# Patient Record
Sex: FEMALE | Race: WHITE | NOT HISPANIC OR LATINO | Employment: OTHER | URBAN - METROPOLITAN AREA
[De-identification: names, ages, dates, MRNs, and addresses within clinical notes are randomized per-mention and may not be internally consistent; named-entity substitution may affect disease eponyms.]

---

## 2023-04-26 DIAGNOSIS — J44.9 CHRONIC OBSTRUCTIVE PULMONARY DISEASE, UNSPECIFIED (MULTI): ICD-10-CM

## 2023-04-26 RX ORDER — FLUTICASONE FUROATE, UMECLIDINIUM BROMIDE AND VILANTEROL TRIFENATATE 200; 62.5; 25 UG/1; UG/1; UG/1
POWDER RESPIRATORY (INHALATION)
Qty: 60 EACH | Refills: 11 | Status: SHIPPED | OUTPATIENT
Start: 2023-04-26 | End: 2024-04-17 | Stop reason: SDUPTHER

## 2023-04-27 PROBLEM — I48.0 PAROXYSMAL ATRIAL FIBRILLATION (MULTI): Status: ACTIVE | Noted: 2023-04-27

## 2023-04-27 PROBLEM — J44.9 COPD (CHRONIC OBSTRUCTIVE PULMONARY DISEASE) (MULTI): Status: ACTIVE | Noted: 2023-04-27

## 2023-04-27 PROBLEM — K21.9 GASTROESOPHAGEAL REFLUX DISEASE: Status: ACTIVE | Noted: 2023-04-27

## 2023-04-27 PROBLEM — M81.0 OSTEOPOROSIS: Status: ACTIVE | Noted: 2023-04-27

## 2023-04-27 PROBLEM — I49.5 SINUS NODE DYSFUNCTION (MULTI): Status: ACTIVE | Noted: 2023-04-27

## 2023-04-27 PROBLEM — E78.00 PURE HYPERCHOLESTEROLEMIA: Status: ACTIVE | Noted: 2023-04-27

## 2023-04-27 PROBLEM — E55.9 VITAMIN D DEFICIENCY: Status: ACTIVE | Noted: 2023-04-27

## 2023-04-27 PROBLEM — R42 VERTIGO, INTERMITTENT: Status: ACTIVE | Noted: 2023-04-27

## 2023-04-27 PROBLEM — I10 HYPERTENSION: Status: ACTIVE | Noted: 2023-04-27

## 2023-04-27 PROBLEM — M16.9 OSTEOARTHRITIS OF HIP: Status: ACTIVE | Noted: 2023-04-27

## 2023-04-27 RX ORDER — LEVALBUTEROL TARTRATE 45 UG/1
1-2 AEROSOL, METERED ORAL EVERY 4 HOURS PRN
COMMUNITY
Start: 2020-11-18

## 2023-04-27 RX ORDER — METOPROLOL SUCCINATE 200 MG/1
1 TABLET, EXTENDED RELEASE ORAL DAILY
COMMUNITY
Start: 2022-08-26 | End: 2023-05-26 | Stop reason: SDUPTHER

## 2023-04-27 RX ORDER — AMLODIPINE BESYLATE 5 MG/1
1 TABLET ORAL DAILY
COMMUNITY
Start: 2020-11-20 | End: 2023-05-26 | Stop reason: SDUPTHER

## 2023-04-27 RX ORDER — FAMOTIDINE 20 MG/1
1 TABLET, FILM COATED ORAL 2 TIMES DAILY
COMMUNITY
Start: 2022-06-15 | End: 2023-05-26 | Stop reason: SDUPTHER

## 2023-04-27 RX ORDER — BIOTIN 1 MG
1 TABLET ORAL DAILY
COMMUNITY
Start: 2019-05-29 | End: 2023-11-08 | Stop reason: ALTCHOICE

## 2023-04-27 RX ORDER — MULTIVIT-MIN/VIT C/HERB NO.124 250-8.875
1 TABLET,CHEWABLE ORAL DAILY
COMMUNITY
Start: 2018-05-07 | End: 2023-11-08 | Stop reason: ALTCHOICE

## 2023-04-27 RX ORDER — APIXABAN 2.5 MG/1
1 TABLET, FILM COATED ORAL EVERY 12 HOURS
COMMUNITY
Start: 2019-08-22 | End: 2023-12-22 | Stop reason: SDUPTHER

## 2023-04-27 RX ORDER — CHOLECALCIFEROL (VITAMIN D3) 50 MCG
1 TABLET ORAL DAILY
COMMUNITY

## 2023-04-27 NOTE — PROGRESS NOTES
Subjective   Patient ID: Ramirez Dobbins is a 89 y.o. female who presents for Breast Mass (Breast mass on RT found on xray 2020 ).    Right breast pain and growing mass -- highly suspicious for cancer per CT 6/2022 -- 2.1 cm in largest diameter spiculated mass.     NOT interested in surgery or chemo.  But interested in diagnosis.     Feeling new lump on the left.     Dtr Pharmacist present to provide some history.       Objective   Visit Vitals  /72 (BP Location: Left arm, Patient Position: Sitting, BP Cuff Size: Adult)   Pulse 68   Temp 36.4 °C (97.6 °F) (Temporal)   Resp 16   SpO2 92%   Smoking Status Former      O: VSS AFEB Awake, Alert, NAD.  No intoxication, withdrawal, or sedation.  Chest CTA.  Heart RRR.  Ext no c/c/e.      Lab Results   Component Value Date    WBC 7.5 08/26/2022    HGB 14.0 08/26/2022    HCT 44.1 08/26/2022    MCV 98 08/26/2022     08/26/2022       Chemistry    Lab Results   Component Value Date/Time     08/26/2022 1332    K 4.1 08/26/2022 1332     08/26/2022 1332    CO2 23 08/26/2022 1332    BUN 20 08/26/2022 1332    CREATININE 0.70 08/26/2022 1332    Lab Results   Component Value Date/Time    CALCIUM 9.2 08/26/2022 1332    ALKPHOS 59 08/26/2022 1332    AST 13 08/26/2022 1332    ALT 11 08/26/2022 1332    BILITOT 0.4 08/26/2022 1332          Lab Results   Component Value Date    CHOL 197 03/01/2022     Lab Results   Component Value Date    HDL 45.1 03/01/2022     No results found for: LDLCALC  No results found for: TRIG  No components found for: CHOLHDL   No results found for: HGBA1C    Assessment/Plan   Problem List Items Addressed This Visit          Respiratory    COPD (chronic obstructive pulmonary disease) (CMS/[facility]) - Primary    Overview     # COPD w/ Dyspnea on Exertion --stable on Trelegy per Dr Cherry. Stress test normal.5/2020. Quit smoking years ago. Has nebulizer alb/atrovent to use as needed but has never used.     7/6/21 PULM -- [facility] -- doubts mild COPD  explains her symptoms. Ordered cxr and echo. Can try trelegy for convenience.    6/25/21 PFTs : mild obstruction, ABG hypoxic, low CO2 w mild respiratory alkalosis. No hypoxia on 6 minute walk.         Relevant Medications    guaiFENesin (Mucinex) 600 mg 12 hr tablet       Other    Breast cancer (CMS/HCC)    Current Assessment & Plan     Check breast bx and pet scan for dx and staging.  Not interested in treatment but interested in prognosis and willing to see palliative care at [facility] when ready.          Relevant Orders    NM PET CT FDG wholebody    BI US guided breast localization and biopsy right

## 2023-04-28 ENCOUNTER — TELEPHONE (OUTPATIENT)
Dept: PRIMARY CARE | Facility: CLINIC | Age: 88
End: 2023-04-28

## 2023-04-28 ENCOUNTER — OFFICE VISIT (OUTPATIENT)
Dept: PRIMARY CARE | Facility: CLINIC | Age: 88
End: 2023-04-28
Payer: MEDICARE

## 2023-04-28 VITALS
DIASTOLIC BLOOD PRESSURE: 72 MMHG | TEMPERATURE: 97.6 F | HEART RATE: 68 BPM | RESPIRATION RATE: 16 BRPM | OXYGEN SATURATION: 92 % | SYSTOLIC BLOOD PRESSURE: 178 MMHG

## 2023-04-28 DIAGNOSIS — C50.911 MALIGNANT NEOPLASM OF RIGHT FEMALE BREAST, UNSPECIFIED ESTROGEN RECEPTOR STATUS, UNSPECIFIED SITE OF BREAST (MULTI): Primary | ICD-10-CM

## 2023-04-28 DIAGNOSIS — J44.9 CHRONIC OBSTRUCTIVE PULMONARY DISEASE, UNSPECIFIED COPD TYPE (MULTI): Primary | ICD-10-CM

## 2023-04-28 DIAGNOSIS — C50.911 MALIGNANT NEOPLASM OF RIGHT FEMALE BREAST, UNSPECIFIED ESTROGEN RECEPTOR STATUS, UNSPECIFIED SITE OF BREAST (MULTI): ICD-10-CM

## 2023-04-28 PROBLEM — M21.70 ACQUIRED LEG LENGTH DISCREPANCY: Status: ACTIVE | Noted: 2023-04-28

## 2023-04-28 PROBLEM — C50.919 BREAST CANCER (MULTI): Status: ACTIVE | Noted: 2023-04-28

## 2023-04-28 PROCEDURE — 1157F ADVNC CARE PLAN IN RCRD: CPT | Performed by: FAMILY MEDICINE

## 2023-04-28 PROCEDURE — 99214 OFFICE O/P EST MOD 30 MIN: CPT | Performed by: FAMILY MEDICINE

## 2023-04-28 PROCEDURE — 1036F TOBACCO NON-USER: CPT | Performed by: FAMILY MEDICINE

## 2023-04-28 PROCEDURE — 3078F DIAST BP <80 MM HG: CPT | Performed by: FAMILY MEDICINE

## 2023-04-28 PROCEDURE — 1159F MED LIST DOCD IN RCRD: CPT | Performed by: FAMILY MEDICINE

## 2023-04-28 PROCEDURE — 1160F RVW MEDS BY RX/DR IN RCRD: CPT | Performed by: FAMILY MEDICINE

## 2023-04-28 PROCEDURE — 3077F SYST BP >= 140 MM HG: CPT | Performed by: FAMILY MEDICINE

## 2023-04-28 RX ORDER — GUAIFENESIN 600 MG/1
600 TABLET, EXTENDED RELEASE ORAL 2 TIMES DAILY
Qty: 60 TABLET | Refills: 11 | Status: SHIPPED | OUTPATIENT
Start: 2023-04-28 | End: 2023-11-08 | Stop reason: ALTCHOICE

## 2023-04-28 ASSESSMENT — ENCOUNTER SYMPTOMS
LOSS OF SENSATION IN FEET: 0
DEPRESSION: 0
OCCASIONAL FEELINGS OF UNSTEADINESS: 0

## 2023-04-28 NOTE — TELEPHONE ENCOUNTER
[facility] called and they do not have shoe lifts  Suggested depending on height she can go to [facility] or [facility] if its less than 1/2 inch  If its more than 1/2 inch she would have to go to -    Orthotics and prosthetics specialty [phone]    [facility]    Mehrdad Nguyen [phone]    She also states medicare will not pay for shoe lifts

## 2023-04-28 NOTE — ASSESSMENT & PLAN NOTE
Check breast bx and pet scan for dx and staging.  Not interested in treatment but interested in prognosis and willing to see palliative care at [facility] when ready.

## 2023-04-28 NOTE — TELEPHONE ENCOUNTER
Dtr. Called.    States the type of PET scan you ordered can ONLY be done at [facility].    Is there another one that would be applicable that they could try and see if it could be scheduled at [facility]?

## 2023-04-30 PROBLEM — Z00.00 MEDICARE ANNUAL WELLNESS VISIT, SUBSEQUENT: Status: ACTIVE | Noted: 2023-04-30

## 2023-05-03 ENCOUNTER — TELEPHONE (OUTPATIENT)
Dept: PRIMARY CARE | Facility: CLINIC | Age: 88
End: 2023-05-03
Payer: MEDICARE

## 2023-05-03 NOTE — TELEPHONE ENCOUNTER
Dtr. Called.    States she tried to call to sched the new PET scan order and was told that it still can only be done [place].  [phone]

## 2023-05-18 ENCOUNTER — HOSPITAL ENCOUNTER (OUTPATIENT)
Dept: DATA CONVERSION | Facility: HOSPITAL | Age: 88
End: 2023-05-18
Attending: RADIOLOGY | Admitting: RADIOLOGY
Payer: MEDICARE

## 2023-05-18 DIAGNOSIS — Z17.0 ESTROGEN RECEPTOR POSITIVE STATUS (ER+): ICD-10-CM

## 2023-05-18 DIAGNOSIS — C50.811 MALIGNANT NEOPLASM OF OVERLAPPING SITES OF RIGHT FEMALE BREAST (MULTI): ICD-10-CM

## 2023-05-18 DIAGNOSIS — N63.10 UNSPECIFIED LUMP IN THE RIGHT BREAST, UNSPECIFIED QUADRANT: ICD-10-CM

## 2023-05-18 DIAGNOSIS — Z88.0 ALLERGY STATUS TO PENICILLIN: ICD-10-CM

## 2023-05-22 LAB
COMPLETE PATHOLOGY REPORT: NORMAL
CONVERTED ADDENDUM DIAGNOSIS 2: NORMAL
CONVERTED CLINICAL DIAGNOSIS-HISTORY: NORMAL
CONVERTED FINAL DIAGNOSIS: NORMAL
CONVERTED FINAL REPORT PDF LINK TO COPY AND PASTE: NORMAL
CONVERTED GROSS DESCRIPTION: NORMAL

## 2023-05-26 ENCOUNTER — OFFICE VISIT (OUTPATIENT)
Dept: PRIMARY CARE | Facility: CLINIC | Age: 88
End: 2023-05-26
Payer: MEDICARE

## 2023-05-26 VITALS
SYSTOLIC BLOOD PRESSURE: 149 MMHG | BODY MASS INDEX: 20.73 KG/M2 | HEIGHT: 61 IN | WEIGHT: 109.8 LBS | TEMPERATURE: 97.3 F | HEART RATE: 65 BPM | RESPIRATION RATE: 16 BRPM | DIASTOLIC BLOOD PRESSURE: 73 MMHG

## 2023-05-26 DIAGNOSIS — R26.9 ABNORMAL GAIT: ICD-10-CM

## 2023-05-26 DIAGNOSIS — I10 PRIMARY HYPERTENSION: ICD-10-CM

## 2023-05-26 DIAGNOSIS — Z00.00 ROUTINE GENERAL MEDICAL EXAMINATION AT HEALTH CARE FACILITY: Primary | ICD-10-CM

## 2023-05-26 DIAGNOSIS — I48.0 PAROXYSMAL ATRIAL FIBRILLATION (MULTI): ICD-10-CM

## 2023-05-26 DIAGNOSIS — Z00.00 MEDICARE ANNUAL WELLNESS VISIT, SUBSEQUENT: ICD-10-CM

## 2023-05-26 DIAGNOSIS — K21.9 GASTROESOPHAGEAL REFLUX DISEASE WITHOUT ESOPHAGITIS: ICD-10-CM

## 2023-05-26 DIAGNOSIS — C50.911 MALIGNANT NEOPLASM OF RIGHT FEMALE BREAST, UNSPECIFIED ESTROGEN RECEPTOR STATUS, UNSPECIFIED SITE OF BREAST (MULTI): ICD-10-CM

## 2023-05-26 PROBLEM — R53.83 FATIGUE: Status: ACTIVE | Noted: 2023-05-26

## 2023-05-26 PROBLEM — I83.93 ASYMPTOMATIC VARICOSE VEINS OF BOTH LOWER EXTREMITIES: Status: ACTIVE | Noted: 2023-05-26

## 2023-05-26 PROBLEM — H61.22 HEARING LOSS OF LEFT EAR DUE TO CERUMEN IMPACTION: Status: ACTIVE | Noted: 2023-05-26

## 2023-05-26 PROBLEM — N63.10 BREAST MASS, RIGHT: Status: ACTIVE | Noted: 2023-05-26

## 2023-05-26 PROBLEM — G83.10: Status: ACTIVE | Noted: 2023-05-26

## 2023-05-26 PROBLEM — R26.89 POOR BALANCE: Status: ACTIVE | Noted: 2023-05-26

## 2023-05-26 PROCEDURE — 3077F SYST BP >= 140 MM HG: CPT | Performed by: FAMILY MEDICINE

## 2023-05-26 PROCEDURE — G0439 PPPS, SUBSEQ VISIT: HCPCS | Performed by: FAMILY MEDICINE

## 2023-05-26 PROCEDURE — 1036F TOBACCO NON-USER: CPT | Performed by: FAMILY MEDICINE

## 2023-05-26 PROCEDURE — 99214 OFFICE O/P EST MOD 30 MIN: CPT | Performed by: FAMILY MEDICINE

## 2023-05-26 PROCEDURE — 3078F DIAST BP <80 MM HG: CPT | Performed by: FAMILY MEDICINE

## 2023-05-26 PROCEDURE — 1159F MED LIST DOCD IN RCRD: CPT | Performed by: FAMILY MEDICINE

## 2023-05-26 PROCEDURE — 1160F RVW MEDS BY RX/DR IN RCRD: CPT | Performed by: FAMILY MEDICINE

## 2023-05-26 PROCEDURE — 1157F ADVNC CARE PLAN IN RCRD: CPT | Performed by: FAMILY MEDICINE

## 2023-05-26 PROCEDURE — 1170F FXNL STATUS ASSESSED: CPT | Performed by: FAMILY MEDICINE

## 2023-05-26 RX ORDER — METOPROLOL SUCCINATE 200 MG/1
200 TABLET, EXTENDED RELEASE ORAL DAILY
Qty: 90 TABLET | Refills: 1 | Status: SHIPPED | OUTPATIENT
Start: 2023-05-26 | End: 2023-08-21

## 2023-05-26 RX ORDER — FAMOTIDINE 20 MG/1
20 TABLET, FILM COATED ORAL 2 TIMES DAILY
Qty: 90 TABLET | Refills: 1 | Status: SHIPPED | OUTPATIENT
Start: 2023-05-26 | End: 2023-08-15

## 2023-05-26 RX ORDER — AMLODIPINE BESYLATE 5 MG/1
5 TABLET ORAL DAILY
Qty: 90 TABLET | Refills: 1 | Status: SHIPPED | OUTPATIENT
Start: 2023-05-26 | End: 2024-04-17 | Stop reason: SDUPTHER

## 2023-05-26 ASSESSMENT — ACTIVITIES OF DAILY LIVING (ADL)
BATHING: INDEPENDENT
MANAGING_FINANCES: INDEPENDENT
DOING_HOUSEWORK: NEEDS ASSISTANCE
TAKING_MEDICATION: INDEPENDENT
GROCERY_SHOPPING: NEEDS ASSISTANCE
DRESSING: INDEPENDENT

## 2023-05-26 ASSESSMENT — ENCOUNTER SYMPTOMS
LOSS OF SENSATION IN FEET: 0
OCCASIONAL FEELINGS OF UNSTEADINESS: 0
DEPRESSION: 0

## 2023-05-26 ASSESSMENT — PATIENT HEALTH QUESTIONNAIRE - PHQ9
SUM OF ALL RESPONSES TO PHQ9 QUESTIONS 1 AND 2: 0
1. LITTLE INTEREST OR PLEASURE IN DOING THINGS: NOT AT ALL
2. FEELING DOWN, DEPRESSED OR HOPELESS: NOT AT ALL

## 2023-05-26 NOTE — PROGRESS NOTES
"Subjective   Reason for Visit: Ramirez Dobbins is an 89 y.o. female here for a Medicare Wellness visit. And recheck on AFIB, COPD, HTN and recent dx'd breast cancer.     Past Medical, Surgical, and Family History reviewed and updated in chart.    Reviewed all medications by prescribing practitioner or clinical pharmacist (such as prescriptions, OTCs, herbal therapies and supplements) and documented in the medical record.    HPI    Patient Care Team:  Aguila Mullins MD as PCP - General     Review of Systems    Objective   Vitals:  /73 (BP Location: Left arm, Patient Position: Sitting, BP Cuff Size: Adult)   Pulse 65   Temp 36.3 °C (97.3 °F) (Temporal)   Resp 16   Ht 1.549 m (5' 1\")   Wt 49.8 kg (109 lb 12.8 oz)   BMI 20.75 kg/m²       O: VSS AFEB Awake, Alert, NAD.  No intoxication, withdrawal, or sedation.  Chest CTA.  Heart RRR.  Ext no c/c/e.      Assessment/Plan   Problem List Items Addressed This Visit          Circulatory    Hypertension    Overview     permissive hypertension on max tolerated   amlodipine 5 mg, and   metoprolol  mg daily.   OFF cardizem due to bradycardia.          Current Assessment & Plan     Continue amlodipine and metoprolol.          Paroxysmal atrial fibrillation (CMS/HCC)    Overview     # pAFIB -- metoprolol and eliqius per Dr Page.    5/28/2020 Nuclear stress test -- normal perfusion and chronicity.    12/14/21 EP: [facility] -- PAF, off diltizasem since 12/9/21. Continue metoprolol.    1/18/2023 CARDIO -- Camilo/DeCapite -- Stable pAFIB/HTN.  Eliquis.  Amlodipine at night.         Current Assessment & Plan     Stable. Continue current meds.  Eliquis.             Digestive    Gastroesophageal reflux disease       Other    Breast cancer (CMS/HCC)    Overview     Bx 5/18/23: Grade 2 Invasive Ductal Ca (Est+, Prog+, HER2 NEG)    5/15/2023 Gen Surgery ([facility]) --planning CT scans to check for mets.          Current Assessment & Plan     No pain.  Encouraged to proceed " with CT scan and if metastasis can consult oncology rather isabella proceed with lumpectomy.          Medicare annual wellness visit, subsequent    Overview     5/26/23  SUBSEQUENT Medicare Wellness -- c/o mild trouble with word finding, no dysphasia, no dysphagia. No polypharmacy, Major fall with injury summer 2022 -- mild falls with no injury since. , mild urge incontinence/nocturia. No depression. No use of cane/walker. Has 70# pretty retriever. No alcohol. Has had both pneumonia vaccines.     POA/Living WIll filed 5/3/17.    # Low albumin/PCM -- encouraged to eat frequent small high calorie meals per day. Gained 1# since she returned home from SNF.     5/26/23  I spent 15 minutes face-to-face with this individual discussing their cardiovascular risk and behavioral therapies of nutritional choices, exercise, and elimination of habits contributing to risk. We agreed on a plan addressing reduction of their current cardiovascular risk. Patient's 10 year CV risk estimate calculates to: >10 %  -- cannot tolerate statins and not on aspirin since already on Eliquis (pradaxa not covered).             Abnormal gait    Overview     Completed SNF rehab 2022 after fall and acetabular fx.          Current Assessment & Plan     Continue PT twice a week -- at [facility] -- goal to get to walk without her walker or cane.     Once she completes PT I still recommmend that she continue regular exercise/silversneakers/[facility].           Other Visit Diagnoses       Routine general medical examination at health care facility    -  Primary

## 2023-05-26 NOTE — ASSESSMENT & PLAN NOTE
No pain.  Encouraged to proceed with CT scan and if metastasis can consult oncology rather isabella proceed with lumpectomy.

## 2023-05-26 NOTE — ASSESSMENT & PLAN NOTE
Bx 5/18/23: Grade 2 Invasive Ductal Ca (Est+, Prog+, HER2 NEG)    5/15/2023 Gen Surgery ([facility])

## 2023-05-26 NOTE — ASSESSMENT & PLAN NOTE
Continue PT twice a week -- at [facility] -- goal to get to walk without her walker or cane.     Once she completes PT I still recommmend that she continue regular exercise/silversneakers/[facility].

## 2023-06-09 LAB
ALANINE AMINOTRANSFERASE (SGPT) (U/L) IN SER/PLAS: 7 U/L (ref 7–45)
ALBUMIN (G/DL) IN SER/PLAS: 4.1 G/DL (ref 3.4–5)
ALKALINE PHOSPHATASE (U/L) IN SER/PLAS: 42 U/L (ref 33–136)
ANION GAP IN SER/PLAS: 15 MMOL/L (ref 10–20)
ASPARTATE AMINOTRANSFERASE (SGOT) (U/L) IN SER/PLAS: 11 U/L (ref 9–39)
BILIRUBIN TOTAL (MG/DL) IN SER/PLAS: 0.4 MG/DL (ref 0–1.2)
CALCIUM (MG/DL) IN SER/PLAS: 9.9 MG/DL (ref 8.6–10.3)
CARBON DIOXIDE, TOTAL (MMOL/L) IN SER/PLAS: 25 MMOL/L (ref 21–32)
CHLORIDE (MMOL/L) IN SER/PLAS: 103 MMOL/L (ref 98–107)
CREATININE (MG/DL) IN SER/PLAS: 0.88 MG/DL (ref 0.5–1.05)
ERYTHROCYTE DISTRIBUTION WIDTH (RATIO) BY AUTOMATED COUNT: 13.9 % (ref 11.5–14.5)
ERYTHROCYTE MEAN CORPUSCULAR HEMOGLOBIN CONCENTRATION (G/DL) BY AUTOMATED: 32.6 G/DL (ref 32–36)
ERYTHROCYTE MEAN CORPUSCULAR VOLUME (FL) BY AUTOMATED COUNT: 99 FL (ref 80–100)
ERYTHROCYTES (10*6/UL) IN BLOOD BY AUTOMATED COUNT: 4.41 X10E12/L (ref 4–5.2)
GFR FEMALE: 62 ML/MIN/1.73M2
GLUCOSE (MG/DL) IN SER/PLAS: 100 MG/DL (ref 74–99)
HEMATOCRIT (%) IN BLOOD BY AUTOMATED COUNT: 43.8 % (ref 36–46)
HEMOGLOBIN (G/DL) IN BLOOD: 14.3 G/DL (ref 12–16)
LEUKOCYTES (10*3/UL) IN BLOOD BY AUTOMATED COUNT: 10.5 X10E9/L (ref 4.4–11.3)
PLATELETS (10*3/UL) IN BLOOD AUTOMATED COUNT: 240 X10E9/L (ref 150–450)
POTASSIUM (MMOL/L) IN SER/PLAS: 4 MMOL/L (ref 3.5–5.3)
PROTEIN TOTAL: 6.9 G/DL (ref 6.4–8.2)
SODIUM (MMOL/L) IN SER/PLAS: 139 MMOL/L (ref 136–145)
UREA NITROGEN (MG/DL) IN SER/PLAS: 17 MG/DL (ref 6–23)

## 2023-08-15 DIAGNOSIS — K21.9 GASTROESOPHAGEAL REFLUX DISEASE WITHOUT ESOPHAGITIS: ICD-10-CM

## 2023-08-15 RX ORDER — FAMOTIDINE 20 MG/1
TABLET, FILM COATED ORAL
Qty: 180 TABLET | Refills: 3 | Status: SHIPPED | OUTPATIENT
Start: 2023-08-15 | End: 2023-09-26

## 2023-08-20 DIAGNOSIS — K21.9 GASTROESOPHAGEAL REFLUX DISEASE WITHOUT ESOPHAGITIS: ICD-10-CM

## 2023-08-20 DIAGNOSIS — I10 PRIMARY HYPERTENSION: ICD-10-CM

## 2023-08-21 RX ORDER — METOPROLOL SUCCINATE 200 MG/1
200 TABLET, EXTENDED RELEASE ORAL DAILY
Qty: 90 TABLET | Refills: 3 | Status: SHIPPED | OUTPATIENT
Start: 2023-08-21 | End: 2024-03-26

## 2023-08-29 ENCOUNTER — OFFICE VISIT (OUTPATIENT)
Dept: PRIMARY CARE | Facility: CLINIC | Age: 88
End: 2023-08-29
Payer: MEDICARE

## 2023-08-29 VITALS
WEIGHT: 108.6 LBS | SYSTOLIC BLOOD PRESSURE: 148 MMHG | OXYGEN SATURATION: 96 % | RESPIRATION RATE: 16 BRPM | TEMPERATURE: 97.3 F | BODY MASS INDEX: 20.52 KG/M2 | DIASTOLIC BLOOD PRESSURE: 64 MMHG | HEART RATE: 68 BPM

## 2023-08-29 DIAGNOSIS — I48.0 PAROXYSMAL ATRIAL FIBRILLATION (MULTI): ICD-10-CM

## 2023-08-29 DIAGNOSIS — N32.81 OAB (OVERACTIVE BLADDER): ICD-10-CM

## 2023-08-29 DIAGNOSIS — G47.20 SLEEP-WAKE CYCLE DISORDER: ICD-10-CM

## 2023-08-29 DIAGNOSIS — R26.9 ABNORMAL GAIT: ICD-10-CM

## 2023-08-29 DIAGNOSIS — M81.0 AGE-RELATED OSTEOPOROSIS WITHOUT CURRENT PATHOLOGICAL FRACTURE: Primary | ICD-10-CM

## 2023-08-29 LAB
APPEARANCE, URINE: ABNORMAL
BACTERIA, URINE: ABNORMAL /HPF
BILIRUBIN, URINE: NEGATIVE
BLOOD, URINE: NEGATIVE
COLOR, URINE: YELLOW
GLUCOSE, URINE: NEGATIVE MG/DL
KETONES, URINE: NEGATIVE MG/DL
LEUKOCYTE ESTERASE, URINE: ABNORMAL
NITRITE, URINE: NEGATIVE
PH, URINE: 6 (ref 5–8)
PROTEIN, URINE: NEGATIVE MG/DL
RBC, URINE: 2 /HPF (ref 0–5)
SPECIFIC GRAVITY, URINE: 1.01 (ref 1–1.03)
SQUAMOUS EPITHELIAL CELLS, URINE: 2 /HPF
UROBILINOGEN, URINE: <2 MG/DL (ref 0–1.9)
WBC CLUMPS, URINE: ABNORMAL /HPF
WBC, URINE: >182 /HPF (ref 0–5)

## 2023-08-29 PROCEDURE — 1126F AMNT PAIN NOTED NONE PRSNT: CPT | Performed by: FAMILY MEDICINE

## 2023-08-29 PROCEDURE — 99214 OFFICE O/P EST MOD 30 MIN: CPT | Performed by: FAMILY MEDICINE

## 2023-08-29 PROCEDURE — 1157F ADVNC CARE PLAN IN RCRD: CPT | Performed by: FAMILY MEDICINE

## 2023-08-29 PROCEDURE — 3077F SYST BP >= 140 MM HG: CPT | Performed by: FAMILY MEDICINE

## 2023-08-29 PROCEDURE — 1160F RVW MEDS BY RX/DR IN RCRD: CPT | Performed by: FAMILY MEDICINE

## 2023-08-29 PROCEDURE — 1036F TOBACCO NON-USER: CPT | Performed by: FAMILY MEDICINE

## 2023-08-29 PROCEDURE — 3078F DIAST BP <80 MM HG: CPT | Performed by: FAMILY MEDICINE

## 2023-08-29 PROCEDURE — 1159F MED LIST DOCD IN RCRD: CPT | Performed by: FAMILY MEDICINE

## 2023-08-29 PROCEDURE — 81001 URINALYSIS AUTO W/SCOPE: CPT

## 2023-08-29 RX ORDER — MIRABEGRON 25 MG/1
25 TABLET, FILM COATED, EXTENDED RELEASE ORAL DAILY
Qty: 30 TABLET | Refills: 11 | Status: SHIPPED | OUTPATIENT
Start: 2023-08-29 | End: 2023-11-08

## 2023-08-29 NOTE — PROGRESS NOTES
"Subjective   Patient ID: Ramirez Dobbins is a 89 y.o. female who presents for ER follow up with Dtr Kristy.  Lives with son Edvin and Brenda.  Had a fall -- tripped over walker -- landed on tail bone -- treatied with tramadol from ER but with increased confusion over the past week.  Claims she can tolerate pain on just tylenol..    Bothered by nocturia q2h for past year or so.  No dysuria.       Objective   Visit Vitals  /64   Pulse 68   Temp 36.3 °C (97.3 °F)   Resp 16   Wt 49.3 kg (108 lb 9.6 oz)   SpO2 96%   BMI 20.52 kg/m²   Smoking Status Former   BSA 1.46 m²      O: VSS AFEB Awake, Alert, NAD.  No intoxication, withdrawal, or sedation.  Chest CTA.  Heart RRR.  Ext no c/c/e.      Lab Results   Component Value Date    WBC 11.3 08/21/2023    HGB 14.5 08/21/2023    HCT 43.5 08/21/2023    MCV 96 08/21/2023     08/21/2023       Chemistry    Lab Results   Component Value Date/Time     (L) 08/21/2023 1047    K 3.7 08/21/2023 1047     08/21/2023 1047    CO2 23 08/21/2023 1047    BUN 14 08/21/2023 1047    CREATININE 0.81 08/21/2023 1047    Lab Results   Component Value Date/Time    CALCIUM 9.5 08/21/2023 1047    ALKPHOS 39 08/21/2023 1047    AST 13 08/21/2023 1047    ALT 9 08/21/2023 1047    BILITOT 0.6 08/21/2023 1047          Lab Results   Component Value Date    CHOL 197 03/01/2022     Lab Results   Component Value Date    HDL 45.1 03/01/2022     No results found for: \"LDLCALC\"  No results found for: \"TRIG\"  No components found for: \"CHOLHDL\"   No results found for: \"HGBA1C\"    Assessment/Plan   Problem List Items Addressed This Visit       Osteoporosis - Primary    Overview     # Osteoporosis -- completed 5 year course as of 1/2020. Follow Vitamin D level and continue supplement and weight bearing exercise.          Paroxysmal atrial fibrillation (CMS/HCC)    Overview     # pAFIB -- metoprolol and eliqius per Dr Page.    5/28/2020 Nuclear stress test -- normal perfusion and " chronicity.    12/14/21 EP: [facility] -- PAF, off diltizasem since 12/9/21. Continue metoprolol.    1/18/2023 CARDIO -- Camilo/De[facility]ite -- Stable pAFIB/HTN.  Eliquis.  Amlodipine at night.         Abnormal gait    Overview     Completed SNF rehab 2022 after fall and acetabular fx.     Fall in ER with contusion and T10 compression 8/2023.         Current Assessment & Plan     RE-order PT twice a week -- at [facility] -- goal to get to walk without her walker or cane.     Once she completes PT I still recommmend that she continue regular exercise/silversneakers/[facility].          OAB (overactive bladder)    Current Assessment & Plan     Check UA C&S  NO anticholinergics due to falls  Try myrbetriq 25 mg nightly.          Sleep-wake cycle disorder    Overview     Encourage melatonin 5 mg at bedtime -- 2 hours before bedtime.

## 2023-08-29 NOTE — ASSESSMENT & PLAN NOTE
RE-order PT twice a week -- at [facility] -- goal to get to walk without her walker or cane.     Once she completes PT I still recommmend that she continue regular exercise/sil[facility]neakers/[facility].

## 2023-08-30 DIAGNOSIS — N30.00 ACUTE CYSTITIS WITHOUT HEMATURIA: Primary | ICD-10-CM

## 2023-08-30 RX ORDER — DOXYCYCLINE 100 MG/1
100 CAPSULE ORAL 2 TIMES DAILY
Qty: 14 CAPSULE | Refills: 0 | Status: SHIPPED | OUTPATIENT
Start: 2023-08-30 | End: 2023-11-20

## 2023-09-23 DIAGNOSIS — K21.9 GASTROESOPHAGEAL REFLUX DISEASE WITHOUT ESOPHAGITIS: ICD-10-CM

## 2023-09-26 RX ORDER — FAMOTIDINE 20 MG/1
20 TABLET, FILM COATED ORAL 2 TIMES DAILY
Qty: 90 TABLET | Refills: 3 | Status: SHIPPED | OUTPATIENT
Start: 2023-09-26 | End: 2024-04-17 | Stop reason: SDUPTHER

## 2023-10-03 ENCOUNTER — TREATMENT (OUTPATIENT)
Dept: PHYSICAL THERAPY | Facility: CLINIC | Age: 88
End: 2023-10-03
Payer: MEDICARE

## 2023-10-03 DIAGNOSIS — Z74.09 IMPAIRED MOBILITY: ICD-10-CM

## 2023-10-03 DIAGNOSIS — R53.1 WEAKNESS: Primary | ICD-10-CM

## 2023-10-03 PROCEDURE — 97530 THERAPEUTIC ACTIVITIES: CPT | Mod: GP,KX | Performed by: PHYSICAL THERAPIST

## 2023-10-03 PROCEDURE — 97110 THERAPEUTIC EXERCISES: CPT | Mod: GP,KX | Performed by: PHYSICAL THERAPIST

## 2023-10-03 ASSESSMENT — PAIN - FUNCTIONAL ASSESSMENT: PAIN_FUNCTIONAL_ASSESSMENT: 0-10

## 2023-10-03 ASSESSMENT — PAIN SCALES - GENERAL: PAINLEVEL_OUTOF10: 2

## 2023-10-03 NOTE — PROGRESS NOTES
Physical Therapy    Physical Therapy Treatment    Patient Name: Ramirez Dobbins  MRN: [id number]  Today's Date: 10/3/2023  Time Calculation  Start Time: 1000  Stop Time: 1052  Time Calculation (min): 52 min      Assessment:   Patient had mild pain in L rib area with standing exercises but patient reported it was tolerable.   Patient was unsteady with turning today with several loss of balance posterior requiring min assist to recover.     Plan: Continue per original POC  All prior information for this patient's episode which began on 9/18/23 is documented in the [employer] system.          Current Problem  1. Weakness        2. Impaired mobility            Subjective   General   Patient she is having trouble with her shoes as she has a shoe lift in her L shoe and she feels the shoes that she has makes her LE heavy.   Patient is having pain in L lower ribs and hip, 2/10 today.   Patient would like to work on her balance.    Precautions   Moderate fall risk      Pain  Pain Assessment: 0-10  Pain Score: 2  Pain Type: Acute pain  Pain Location: Rib cage  Pain Orientation: Left    Objective        Treatments:  Therapeutic Exercise  Therapeutic Exercise Performed: Yes  Therapeutic Exercise Activity 1: SciFit x level 1 resistance at seat 9 - patient began to have pain in L knee during so PT stopped exercises at 4 min 23 seconds  Therapeutic Exercise Activity 2: standing heel raises x 20 with UE support  Therapeutic Exercise Activity 3: standing hip abduction 2 x 10  Therapeutic Exercise Activity 4: standing hip extension 2 x 10    Therapeutic Activity  Therapeutic Activity 1: sit to stand from chair  Therapeutic Activity 2: standing on foam gentle lateral reach  Therapeutic Activity 3: sidestepping over line x 10  Therapeutic Activity 4: stepping forward and back over line x 10  Therapeutic Activity 5: standing on foam reaching above head and looking up x 10  Therapeutic Activity 6: turning in parallel bars stepping over line x  "10  Therapeutic Activity 7: standing toe taps onto 6 inch block x 10 each LE      Goals:  Encounter Problems       Encounter Problems (Active)       Balance       Pt will reduce TUG score by at least 3 sec to decrease the risk of falls.       Start:  10/03/23    Expected End:  11/13/23               PT Problem       Pt will improve Romberg score by 2/6 score       Start:  10/03/23    Expected End:  11/13/23            Pt will improve gait mechanics evidenced by equal step length, increased WBing LLE, and LRAD in order to participate in ADL, IADL, work, and leisure skills         Start:  10/03/23    Expected End:  11/13/23            Pt will increase strength of BLE musculature by at least 1 mm grade to participate in ADL, IADL, work, and leisure skills         Start:  10/03/23    Expected End:  11/13/23            Pt will improve \"AMPAC\" score to at least 30 for increased independence and ease with ADL/IADL's, skills, and work/leisure activities.         Start:  10/03/23    Expected End:  11/13/23            Pt will improve stair negotiation by use of a reciprocal pattern with single use of HR to be able to negotiate different levels of home         Start:  10/03/23    Expected End:  11/13/23                  "

## 2023-10-05 PROBLEM — S32.402A: Status: ACTIVE | Noted: 2023-10-05

## 2023-10-05 PROBLEM — N63.0 BREAST MASS: Status: ACTIVE | Noted: 2023-10-05

## 2023-10-05 PROBLEM — D64.9 ANEMIA: Status: ACTIVE | Noted: 2023-10-05

## 2023-10-05 PROBLEM — C50.911 BREAST CANCER, RIGHT (MULTI): Status: ACTIVE | Noted: 2023-04-28

## 2023-10-05 PROBLEM — R06.02 SOB (SHORTNESS OF BREATH) ON EXERTION: Status: ACTIVE | Noted: 2023-10-05

## 2023-10-05 PROBLEM — G83.11: Status: ACTIVE | Noted: 2023-10-05

## 2023-10-05 PROBLEM — Z79.899 DRUG THERAPY: Status: ACTIVE | Noted: 2023-10-05

## 2023-10-05 PROBLEM — M62.81 MUSCLE WEAKNESS: Status: ACTIVE | Noted: 2023-10-05

## 2023-10-05 PROBLEM — R20.2 PARESTHESIA: Status: ACTIVE | Noted: 2023-10-05
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
## 2023-10-05 PROBLEM — R25.2 BILATERAL LEG CRAMPS: Status: ACTIVE | Noted: 2023-10-05

## 2023-10-05 PROBLEM — R00.1 BRADYCARDIA: Status: ACTIVE | Noted: 2023-10-05

## 2023-10-05 RX ORDER — ACETAMINOPHEN 500 MG
TABLET ORAL
COMMUNITY
Start: 2022-06-15 | End: 2024-01-29 | Stop reason: ALTCHOICE

## 2023-10-05 RX ORDER — TAMOXIFEN CITRATE 20 MG/1
TABLET ORAL
COMMUNITY
Start: 2023-08-31 | End: 2023-12-27 | Stop reason: SDUPTHER

## 2023-10-06 ENCOUNTER — TREATMENT (OUTPATIENT)
Dept: PHYSICAL THERAPY | Facility: CLINIC | Age: 88
End: 2023-10-06
Payer: MEDICARE

## 2023-10-06 DIAGNOSIS — Z74.09 IMPAIRED MOBILITY: ICD-10-CM

## 2023-10-06 DIAGNOSIS — R53.1 WEAKNESS: Primary | ICD-10-CM

## 2023-10-06 PROCEDURE — 97110 THERAPEUTIC EXERCISES: CPT | Mod: KX,GP | Performed by: PHYSICAL THERAPIST

## 2023-10-06 NOTE — PROGRESS NOTES
Physical Therapy    Physical Therapy Treatment    Patient Name: Ramirez Dobbins  MRN: [id number]  Today's Date: 10/6/2023  Time Calculation  Start Time: 1050  Stop Time: 1140  Time Calculation (min): 50 min  Visit 3    Assessment:   Pt tolerated treatment well and without complaint. Mild loss of balance posteriorly in parallel bars but was able to stabilize with UE support. Pt will likely benefit from further PT to address deficits noted on initial evaluation and to progress toward goals as tolerated.     Plan: Continue per original POC  All prior information for this patient's episode which began on 9/18/23 is documented in the [employer] system.          Current Problem  1. Weakness        2. Impaired mobility            Subjective   Pt reports 0/10 pain today. No new complaints. No new falls.      Precautions   Moderate fall risk      Objective        Treatments:  Treatments:  Standing exercises completed with close SBA and intermittent CGA  Therapeutic Exercise  Therapeutic Exercise Performed: Yes  Therapeutic Exercise Activity 1: SciFit x level 1.0, seat 9, x 10 minutes   Therapeutic Exercise Activity 2: standing heel raises x 20 with UE support  Therapeutic Exercise Activity 3: standing hip abduction 2 x 10 L/R  Therapeutic Exercise Activity 4: standing hip extension 2 x 10 L/R     Therapeutic Activity  Therapeutic Activity 1: sit to stand from chair (20 inches) x 9   Therapeutic Activity 2: standing on foam gentle lateral reach x 15 L/R  Therapeutic Activity 3: sidestepping over line x 15  Therapeutic Activity 4: stepping forward and back over line x 15  Therapeutic Activity 5: standing on foam reaching above head and looking up x 10  Therapeutic Activity 6: turning in parallel bars stepping over line x 10  Therapeutic Activity 7: standing toe taps onto 6 inch block x 20 each LE  Toe raises x 20 bilateral  Sidestepping in parallel bars x 3 laps in parallel bars with bilateral UE support  Abdominal bracing x 10 (5  "second hold)              OP EDUCATION:  PT POC   Pt verbalized understanding       Goals:    Encounter Problems         Encounter Problems (Active)         Balance         Pt will reduce TUG score by at least 3 sec to decrease the risk of falls.         Start:  10/03/23    Expected End:  11/13/23                 PT Problem         Pt will improve Romberg score by 2/6 score         Start:  10/03/23    Expected End:  11/13/23              Pt will improve gait mechanics evidenced by equal step length, increased WBing LLE, and LRAD in order to participate in ADL, IADL, work, and leisure skills           Start:  10/03/23    Expected End:  11/13/23              Pt will increase strength of BLE musculature by at least 1 mm grade to participate in ADL, IADL, work, and leisure skills           Start:  10/03/23    Expected End:  11/13/23               Pt will improve \"AMPAC\" score to at least 30 for increased independence and ease with ADL/IADL's, skills, and work/leisure activities.           Start:  10/03/23    Expected End:  11/13/23              Pt will improve stair negotiation by use of a reciprocal pattern with single use of HR to be able to negotiate different levels of home           Start:  10/03/23    Expected End:  11/13/23             "

## 2023-10-10 ENCOUNTER — TREATMENT (OUTPATIENT)
Dept: PHYSICAL THERAPY | Facility: CLINIC | Age: 88
End: 2023-10-10
Payer: MEDICARE

## 2023-10-10 DIAGNOSIS — R53.1 WEAKNESS: Primary | ICD-10-CM

## 2023-10-10 DIAGNOSIS — Z74.09 IMPAIRED MOBILITY: ICD-10-CM

## 2023-10-10 PROCEDURE — 97110 THERAPEUTIC EXERCISES: CPT | Mod: GP,CQ,KX

## 2023-10-10 PROCEDURE — 97530 THERAPEUTIC ACTIVITIES: CPT | Mod: GP,CQ,KX

## 2023-10-10 NOTE — PROGRESS NOTES
Physical Therapy    Physical Therapy Treatment    Patient Name: Ramirez Dobbins  MRN: [id number]  Today's Date: 10/10/2023  Time Calculation  Start Time: 1517  Stop Time: 1614  Time Calculation (min): 57 min  Visit 4    Assessment:  Note intermittent retro lean ,no loss of balance . Unsteady with trunk rotation and 1/4 turns Pt. Took 2 short seated rest breaks per PTA request otherwise did not seek rest break.Pt. Tolerated previous session well . No complaints voiced at beginning , during , or immed. Following PT session today    Plan: Continue per original POC  All prior information for this patient's episode which began on 9/18/23 is documented in the [employer] system.          Current Problem  1. Weakness        2. Impaired mobility            Subjective   Pt reports 0/10 pain today. No new complaints. No new falls.   Pt. Shares that she does take Tylenol 4 x a day to address rib pain that she has dealt with since most recent fall . Pt. Notes L knee pain depending on shoes she is wearing since 1 pair was built up by a 1/2 inch . Pt. States shoes feel 'great' short of the weight added and the awkwardness increased with walking. Today pt. wearing another pair of shoes with a lift insert so she is not sure what is causing knee pain she hasn't felt before . No signs of problem revealed with x-ray done on knee 2 weeks ago.     Precautions   Moderate fall risk             Treatments:  Standing exercises completed with  SBA   Therapeutic Exercise  Therapeutic Exercise Performed: Yes  Therapeutic Exercise Activity 1: SciFit x level 1.0, seat 9, x 10 minutes   Therapeutic Exercise Activity 2: standing heel raises x 20 with UE support  Therapeutic Exercise Activity 3: standing hip abduction 2 x 10 L/R  Therapeutic Exercise Activity 4: standing hip extension 2 x 10 L/R   Toe raises x 20 bilateral  Therapeutic Activity SBA unless indicated otherwise  Therapeutic Activity 1: sit to stand from chair (20 inches) x 10   Therapeutic  "Activity 2: standing on foam trunk rotation ball pas L->and R->L x 10 reps CGA  Therapeutic Activity 3: sidestepping over line 2 sets x 10  Therapeutic Activity 4: stepping forward and back over line 2 sets x 10 each lead L, lead R  Therapeutic Activity 5: standing on [employer] looking up x 10  Therapeutic Activity 6: 1/4 turns in parallel bars stepping over line x 10 CGA  Therapeutic Activity 7: standing toe taps onto 6 inch block x 10 each LE  Sidestepping in parallel bars x 3 laps in parallel bars with bilateral UE support  Abdominal bracing x 10 (5 second hold) (not today)      Goals:    Encounter Problems         Encounter Problems (Active)         Balance         Pt will reduce TUG score by at least 3 sec to decrease the risk of falls.         Start:  10/03/23    Expected End:  11/13/23                 PT Problem         Pt will improve Romberg score by 2/6 score         Start:  10/03/23    Expected End:  11/13/23              Pt will improve gait mechanics evidenced by equal step length, increased WBing LLE, and LRAD in order to participate in ADL, IADL, work, and leisure skills           Start:  10/03/23    Expected End:  11/13/23              Pt will increase strength of BLE musculature by at least 1 mm grade to participate in ADL, IADL, work, and leisure skills           Start:  10/03/23    Expected End:  11/13/23               Pt will improve \"AMPAC\" score to at least 30 for increased independence and ease with ADL/IADL's, skills, and work/leisure activities.           Start:  10/03/23    Expected End:  11/13/23              Pt will improve stair negotiation by use of a reciprocal pattern with single use of HR to be able to negotiate different levels of home           Start:  10/03/23    Expected End:  11/13/23             "

## 2023-10-12 ENCOUNTER — TREATMENT (OUTPATIENT)
Dept: PHYSICAL THERAPY | Facility: CLINIC | Age: 88
End: 2023-10-12
Payer: MEDICARE

## 2023-10-12 DIAGNOSIS — Z74.09 IMPAIRED MOBILITY: ICD-10-CM

## 2023-10-12 DIAGNOSIS — R53.1 WEAKNESS: Primary | ICD-10-CM

## 2023-10-12 PROCEDURE — 97530 THERAPEUTIC ACTIVITIES: CPT | Mod: GP,KX

## 2023-10-12 PROCEDURE — 97110 THERAPEUTIC EXERCISES: CPT | Mod: GP,KX

## 2023-10-12 NOTE — PROGRESS NOTES
Physical Therapy    Physical Therapy Treatment    Patient Name: Ramirez Dobbins  MRN: [id number]  Today's Date: 10/12/2023  Time Calculation  Start Time: 1033  Stop Time: 1113  Time Calculation (min): 40 min      Assessment:  Patient appeared to tolerate session well as noted by no reports of increased pain. Patient highly motivated throughout session. PT requested patient to take 1-2 rest breaks. Good stability with MIP and static stance on foam. Patient would likely continue to benefit from skilled PT services.     All prior information for this patient's episode which began on 9/18/23, is documented in the [facility] system.     Plan:  Progress with POC as able and tolerated by patient.     Current Problem  1. Weakness        2. Impaired mobility            Subjective   General  The patient reports she had a hard time walking this morning due to feeling off balance and her shoes hurt her L foot. Notes she has a shoe lift in her L foot. Notes L knee is better, no pain. Notes she still has soreness on left side of ribs, notes 2/10 pain. No recent falls. Has been getting in [id number] steps a day in the driveway. Would like to work on her balance and getting legs stronger. Feels like she loses her balance when turning to look behind her.     Precautions  Fall risk   Pain  See subjective. Pt noted no increased pain at end of session and left therapy in no distress.     Treatments:    Standing exercises completed with  SBA to CGA for safety   Therapeutic Exercise  -SciFit x level 2.0, seat 9, x 11 minutes BUEs and BLEs   Standing in // bars:   -R/L hip flexion/SLR 2 x 10 reps each   -R/L hip abduction/SLR 2 x 10 reps each   -B heel/toe raises x 25 reps   -R/L hip extension 2 x 10 reps each   -R/L HS curls x 20 reps each alt R/L BUE support   Therapeutic Activity SBA unless indicated otherwise  -Side stepping in // bars x 4 laps with orange tband above knees   -STS from blue chair x 10 reps; UE support required for last ~5 reps  "due to fatigue   - Toe taps to 4\" step x 20 reps forward and x 20 reps to R/L ;no UE support; SBA   -Static stance on foam x 1 min no UE support   -Static stance on foam with head turns to R/L x 8 reps total   -Rockerboard AP and side/side x 20 reps each; UE support PRN   -MIP x 20 reps alt R/L UE support PRN       Goals:  Active       Balance       Pt will reduce TUG score by at least 3 sec to decrease the risk of falls.       Start:  10/03/23    Expected End:  11/13/23               PT Problem       Pt will improve Romberg score by 2/6 score       Start:  10/03/23    Expected End:  11/13/23            Pt will improve gait mechanics evidenced by equal step length, increased WBing LLE, and LRAD in order to participate in ADL, IADL, work, and leisure skills         Start:  10/03/23    Expected End:  11/13/23            Pt will increase strength of BLE musculature by at least 1 mm grade to participate in ADL, IADL, work, and leisure skills         Start:  10/03/23    Expected End:  11/13/23            Pt will improve \"AMPAC\" score to at least 30 for increased independence and ease with ADL/IADL's, skills, and work/leisure activities.         Start:  10/03/23    Expected End:  11/13/23            Pt will improve stair negotiation by use of a reciprocal pattern with single use of HR to be able to negotiate different levels of home         Start:  10/03/23    Expected End:  11/13/23               "

## 2023-10-17 ENCOUNTER — HOSPITAL ENCOUNTER (OUTPATIENT)
Dept: RADIOLOGY | Facility: HOSPITAL | Age: 88
Discharge: HOME | End: 2023-10-17
Payer: MEDICARE

## 2023-10-17 ENCOUNTER — TREATMENT (OUTPATIENT)
Dept: PHYSICAL THERAPY | Facility: CLINIC | Age: 88
End: 2023-10-17
Payer: MEDICARE

## 2023-10-17 DIAGNOSIS — Z09 ENCOUNTER FOR FOLLOW-UP EXAMINATION AFTER COMPLETED TREATMENT FOR CONDITIONS OTHER THAN MALIGNANT NEOPLASM: ICD-10-CM

## 2023-10-17 DIAGNOSIS — Z74.09 IMPAIRED MOBILITY: ICD-10-CM

## 2023-10-17 DIAGNOSIS — R53.1 WEAKNESS: Primary | ICD-10-CM

## 2023-10-17 DIAGNOSIS — M80.88XA OTHER OSTEOPOROSIS WITH CURRENT PATHOLOGICAL FRACTURE, VERTEBRA(E), INITIAL ENCOUNTER FOR FRACTURE (MULTI): ICD-10-CM

## 2023-10-17 DIAGNOSIS — C50.919 MALIGNANT NEOPLASM OF UNSPECIFIED SITE OF UNSPECIFIED FEMALE BREAST (MULTI): ICD-10-CM

## 2023-10-17 DIAGNOSIS — N63.10 UNSPECIFIED LUMP IN THE RIGHT BREAST, UNSPECIFIED QUADRANT: ICD-10-CM

## 2023-10-17 PROCEDURE — 97110 THERAPEUTIC EXERCISES: CPT | Mod: GP,KX | Performed by: PHYSICAL THERAPIST

## 2023-10-17 PROCEDURE — 97530 THERAPEUTIC ACTIVITIES: CPT | Mod: GP,KX | Performed by: PHYSICAL THERAPIST

## 2023-10-17 PROCEDURE — 77085 DXA BONE DENSITY AXL VRT FX: CPT | Performed by: RADIOLOGY

## 2023-10-17 PROCEDURE — 77085 DXA BONE DENSITY AXL VRT FX: CPT

## 2023-10-17 NOTE — PROGRESS NOTES
"Physical Therapy    Physical Therapy Treatment    Patient Name: Ramirez Dobbins  MRN: [id number]  Today's Date: 10/17/2023  Time Calculation  Start Time: 1028  Stop Time: 1115  Time Calculation (min): 47 min      Assessment:   Patient tolerated bike and standing exercises without any increase in pain.      Plan:   Continue per POC.  Monitor response to progress and adjust plan as needed.      Current Problem  1. Weakness  Follow Up In Physical Therapy      2. Impaired mobility  Follow Up In Physical Therapy          Subjective   General   Patient started wearing shoes with lift and she feels ok but has increased pain in R posterior hip and lower back since walking yesterday.  She did gabriele Lidocaine patch this AM.     Precautions   Fall risk      Pain   Some movements increase pain in R lower back up to 5/10     Objective       Therapeutic Exercise  -SciFit x level 2.0, seat 9, x 10 minutes BUEs and BLEs  (level 1 today secondary to pain in R hip) (pain resolved after)   Standing in // bars:   -R/L hip flexion/SLR 2 x 10 reps each   -R/L hip abduction/SLR 2 x 10 reps each   -B heel/toe raises x 25 reps   -R/L hip extension 2 x 10 reps each   -R/L HS curls x 20 reps each alt R/L BUE support   -piriformis stretch sitting secondary to increased pain today R LE only - patient reported feeling  a stretch but had pain when lifting LE up into position. X 30 seconds x 2 times      Therapeutic Activity SBA unless indicated otherwise  -Side stepping in // bars x 4 laps with orange tband above knees   -STS from blue chair x 10 reps; UE support required for last ~5 reps due to fatigue - held today secondary to pain   - Toe taps to 4\" step x 20 reps forward and x 20 reps to R/L ;no UE support; SBA   -tandem stance on solid surface x 30 seconds x 3 R/L   -Static stance on foam x 1 min no UE support   -Static stance on foam with head turns to R/L x 8 reps total   -Rockerboard AP and side/side x 20 reps each; UE support PRN   -MIP x 20 " "reps alt R/L UE support PRN   OP EDUCATION:   Educated on focusing on staying inside walker and keeping walker in front when turning.     Goals:  Active       Balance       Pt will reduce TUG score by at least 3 sec to decrease the risk of falls.       Start:  10/03/23    Expected End:  11/13/23               PT Problem       Pt will improve Romberg score by 2/6 score       Start:  10/03/23    Expected End:  11/13/23            Pt will improve gait mechanics evidenced by equal step length, increased WBing LLE, and LRAD in order to participate in ADL, IADL, work, and leisure skills         Start:  10/03/23    Expected End:  11/13/23            Pt will increase strength of BLE musculature by at least 1 mm grade to participate in ADL, IADL, work, and leisure skills         Start:  10/03/23    Expected End:  11/13/23            Pt will improve \"AMPAC\" score to at least 30 for increased independence and ease with ADL/IADL's, skills, and work/leisure activities.         Start:  10/03/23    Expected End:  11/13/23            Pt will improve stair negotiation by use of a reciprocal pattern with single use of HR to be able to negotiate different levels of home         Start:  10/03/23    Expected End:  11/13/23               "

## 2023-10-19 ENCOUNTER — TREATMENT (OUTPATIENT)
Dept: PHYSICAL THERAPY | Facility: CLINIC | Age: 88
End: 2023-10-19
Payer: MEDICARE

## 2023-10-19 DIAGNOSIS — R53.1 WEAKNESS: Primary | ICD-10-CM

## 2023-10-19 DIAGNOSIS — Z74.09 IMPAIRED MOBILITY: ICD-10-CM

## 2023-10-19 PROCEDURE — 97530 THERAPEUTIC ACTIVITIES: CPT | Mod: GP,KX | Performed by: PHYSICAL THERAPIST

## 2023-10-19 PROCEDURE — 97110 THERAPEUTIC EXERCISES: CPT | Mod: GP,KX | Performed by: PHYSICAL THERAPIST

## 2023-10-19 NOTE — PROGRESS NOTES
Physical Therapy    Physical Therapy Treatment    Patient Name: Ramirez Dobbins  MRN: [id number]  Today's Date: 10/19/2023  Time Calculation  Start Time: 1229  Stop Time: 1318  Time Calculation (min): 49 min      Assessment:   Patient had good stability today with gait in parallel bars without UE support with only one loss of balance but patient was able to recover with UE support.  Patient does require cues to focus on base of support when turning.  Patient continues to benefit from PT to improve balance and hip strength to decrease fall risk.      Plan:   Continue per POC     Current Problem  1. Weakness  Follow Up In Physical Therapy      2. Impaired mobility  Follow Up In Physical Therapy          Subjective   General   Patient reported her R hip and L knee hurt today and she is still wearing shoes with lift 3/10 with sit to stand. Patient reported intermittent dizziness when sitting up in bed.    Precautions   Fall risk     Pain   3/10 R hip     Objective     Treatments:  Therapeutic exercises completed this date to improve strength and postural control to improve ability to perform functional activities safely.    Therapeutic Exercise  Therapeutic Exercise Activity 1: SciFit x level 1 resistance at seat 9 - x 10 min  Therapeutic Exercise Activity 2: standing heel raises x 20 with UE support  Therapeutic Exercise Activity 3: standing hip abduction 2 x 10  Therapeutic Exercise Activity 4: standing hip extension 2 x 10  Therapeutic Exercise Activity 5: standing hamstring curls 2 x 10  Therapeutic activity completed to address balance and stability to decrease fall risk and improve functional mobility.     Therapeutic Activity  Therapeutic Activity 1: sit to stand from chair 1 x 10 and 1 x 4  Therapeutic Activity 2: standing toe taps onto 6 inch block x 10 each LE  Therapeutic Activity 3: rockerboard AP shits 2 x 10  Therapeutic Activity 4: MIP x 20 with intermittent UE support  Therapeutic Activity 5: sidestepping  "without UE support x 4 laps  Therapeutic Activity 6: tandem stance on foam x 30 seconds x 3 times R/L  Therapeutic Activity 7: tandem walk with intermittent UE support x 4  Therapeutic Activity 8: steps ups onto 6 inch step 2 x 10  Therapeutic Activity 9: walking in bars without UE support and turns x 5 laps    OP EDUCATION:   Educated to continue with HEP     Goals:  Active       Balance       Pt will reduce TUG score by at least 3 sec to decrease the risk of falls.       Start:  10/03/23    Expected End:  11/13/23               PT Problem       Pt will improve Romberg score by 2/6 score       Start:  10/03/23    Expected End:  11/13/23            Pt will improve gait mechanics evidenced by equal step length, increased WBing LLE, and LRAD in order to participate in ADL, IADL, work, and leisure skills         Start:  10/03/23    Expected End:  11/13/23            Pt will increase strength of BLE musculature by at least 1 mm grade to participate in ADL, IADL, work, and leisure skills         Start:  10/03/23    Expected End:  11/13/23            Pt will improve \"AMPAC\" score to at least 30 for increased independence and ease with ADL/IADL's, skills, and work/leisure activities.         Start:  10/03/23    Expected End:  11/13/23            Pt will improve stair negotiation by use of a reciprocal pattern with single use of HR to be able to negotiate different levels of home         Start:  10/03/23    Expected End:  11/13/23               "

## 2023-10-23 ENCOUNTER — TREATMENT (OUTPATIENT)
Dept: PHYSICAL THERAPY | Facility: CLINIC | Age: 88
End: 2023-10-23
Payer: MEDICARE

## 2023-10-23 DIAGNOSIS — Z74.09 IMPAIRED MOBILITY: ICD-10-CM

## 2023-10-23 DIAGNOSIS — R53.1 WEAKNESS: ICD-10-CM

## 2023-10-23 PROCEDURE — 97530 THERAPEUTIC ACTIVITIES: CPT | Mod: GP,KX

## 2023-10-23 NOTE — PROGRESS NOTES
"Physical Therapy    Physical Therapy Treatment    Patient Name: Ramirez Dobbins  MRN: [id number]  Today's Date: 10/23/2023  Time Calculation  Start Time: 0955  Stop Time: 1035  Time Calculation (min): 40 min      Assessment: Pt is a 89 yo female who has been attending PT following a fall to address her impairments of decreased ROM, BLE weakness, impaired gait, and impaired balance. Pt has met most of her established goals. She would like to be daisy to use her cane within the house  and will continue to benefit from PT for gait training, balance training, and strengthening to decrease her risk of false and improve independence with mobility.  PT Assessment  PT Assessment Results: Decreased strength, Decreased mobility  Rehab Prognosis: Good    Plan:  OP PT Plan  Treatment/Interventions: Education/ Instruction, Gait training, Neuromuscular re-education, Therapeutic activities, Therapeutic exercises  PT Plan: Skilled PT  PT Frequency: 2 times per week  Duration: 4 weeks  Rehab Potential: Good    Current Problem  1. Weakness  Follow Up In Physical Therapy      2. Impaired mobility  Follow Up In Physical Therapy          Subjective   General   Pt states she is doing better, has worked on balance with therapy. She is improving. Pt states she is having pain in her low back this past week (across). Rib continues to be sore, it will catch and increases in pain. Pt states walking helps reduce the pain  Precautions  Precautions  Precautions Comment: Moderate fall risk  Vital Signs     Pain    2/10, L lower ribs    Objective   Ortho   Observation   Posture: fair  Observed Posture Impairment: rounded shoulders, thoracic kyphosis   ROM / Joint Mobility   (Range of Motion in degrees)   Hip:   (Key = \" P! \" Denotes Pain with Movement)   Hip within normal limits active on the right side.   Flexion: L Active 70 degrees, P!, L Passive 90 degrees.   Transitions and Transfers   Functional Mobility Assessment: independent from sit to supine, " "independent from supine to sit, independent rolling, modified independent from sit to stand, modified independent from stand to sit.   Stairs: Pt negotiated four 6\" stairs with single HR use, reciprocal pattern, SBA  Gait: The patient presents to therapy ambulating with FWW. Patient demonstrates improved upright posture and equal Wbing through LE's at an appropriate aurea  Pt ambulated with straight cane in RUE, CGA required, 100', increased Wbing through cane, decreased aurea  Strength   Hip:   (Key: \"P!\" Denotes Pain with Movement)   Extension: 4-/5 on right and 4/5 on left.   Flexion: 4/5 on right, seated, 4-/5 on left and seated.   Abduction: 4/5 on right, seated, 4/5 on left and seated.   Adduction: 4/5 on right, seated, 4/5 on left and seated.   Knee:   (Key: \" P! \" Denotes Pain with Movement)   Knee extension was 4+/5 on right, 4+/5 on left.   Knee flexion was 4+/5 on right, 4/5 on left.   Foot and Ankle:   (Key: \" P! \" Denotes Pain with Movement)   Foot dorsiflexion was 4+/5 on right, 4+/5 on left.   Foot plantar flexion was 4+/5 on right, 4+/5 on left.   Balance and Coordination   Seated Balance: supported: good, unsupported: good.   Static Standing Balance:.   Romberg test: 5/6.   Outcome Measures (TU.14 FWW.. AMPAC: 36 raw score)       Treatments:  Therapeutic Activity:  -multiple tests/measures for recheck  -discussion of POC      OP EDUCATION:  Education  Individual(s) Educated: Patient  Education Provided: Fall Risk, POC  Plan of Care Discussed and Agreed Upon: yes  Patient Response to Education: Patient/Caregiver Verbalized Understanding of Information, Patient/Caregiver Asked Appropriate Questions    Goals:  Active       PT Problem       Pt will improve Romberg score by 2/6 score (Met)       Start:  10/03/23    Expected End:  23    Resolved:  10/23/23         Pt will improve gait mechanics evidenced by equal step length, increased WBing LLE, and LRAD in order to participate in ADL, " "IADL, work, and leisure skills   (Met)       Start:  10/03/23    Expected End:  11/13/23    Resolved:  10/23/23         Pt will increase strength of BLE musculature by at least 1 mm grade to participate in ADL, IADL, work, and leisure skills   (Progressing)       Start:  10/03/23    Expected End:  11/13/23            Pt will improve \"AMPAC\" score to at least 30 for increased independence and ease with ADL/IADL's, skills, and work/leisure activities.   (Progressing)       Start:  10/03/23    Expected End:  11/13/23            Pt will improve stair negotiation by use of a reciprocal pattern with single use of HR to be able to negotiate different levels of home   (Met)       Start:  10/03/23    Expected End:  11/13/23    Resolved:  10/23/23         Pt will ambulate short household sitances ~50' with use of straight cane without LOB, mod I, equal WBing through LE's, and proper aurea to improve independence of mobility within home       Start:  10/23/23    Expected End:  12/18/23                  Resolved       Balance       Pt will reduce TUG score by at least 3 sec to decrease the risk of falls. (Met)       Start:  10/03/23    Expected End:  11/13/23    Resolved:  10/23/23            "

## 2023-11-07 ENCOUNTER — APPOINTMENT (OUTPATIENT)
Dept: PHYSICAL THERAPY | Facility: CLINIC | Age: 88
End: 2023-11-07
Payer: MEDICARE

## 2023-11-08 ENCOUNTER — OFFICE VISIT (OUTPATIENT)
Dept: CARDIOLOGY | Facility: HOSPITAL | Age: 88
End: 2023-11-08
Payer: MEDICARE

## 2023-11-08 VITALS
HEART RATE: 69 BPM | BODY MASS INDEX: 20.15 KG/M2 | OXYGEN SATURATION: 94 % | DIASTOLIC BLOOD PRESSURE: 57 MMHG | HEIGHT: 61 IN | WEIGHT: 106.7 LBS | SYSTOLIC BLOOD PRESSURE: 139 MMHG

## 2023-11-08 DIAGNOSIS — I10 HYPERTENSION, UNSPECIFIED TYPE: Primary | ICD-10-CM

## 2023-11-08 DIAGNOSIS — I48.0 PAROXYSMAL ATRIAL FIBRILLATION (MULTI): ICD-10-CM

## 2023-11-08 PROCEDURE — 99213 OFFICE O/P EST LOW 20 MIN: CPT | Performed by: INTERNAL MEDICINE

## 2023-11-08 PROCEDURE — 1159F MED LIST DOCD IN RCRD: CPT | Performed by: INTERNAL MEDICINE

## 2023-11-08 PROCEDURE — 3078F DIAST BP <80 MM HG: CPT | Performed by: INTERNAL MEDICINE

## 2023-11-08 PROCEDURE — 99213 OFFICE O/P EST LOW 20 MIN: CPT | Mod: PO | Performed by: INTERNAL MEDICINE

## 2023-11-08 PROCEDURE — 1160F RVW MEDS BY RX/DR IN RCRD: CPT | Performed by: INTERNAL MEDICINE

## 2023-11-08 PROCEDURE — 93005 ELECTROCARDIOGRAM TRACING: CPT | Performed by: INTERNAL MEDICINE

## 2023-11-08 PROCEDURE — 3075F SYST BP GE 130 - 139MM HG: CPT | Performed by: INTERNAL MEDICINE

## 2023-11-08 PROCEDURE — 1126F AMNT PAIN NOTED NONE PRSNT: CPT | Performed by: INTERNAL MEDICINE

## 2023-11-08 PROCEDURE — 93010 ELECTROCARDIOGRAM REPORT: CPT | Performed by: INTERNAL MEDICINE

## 2023-11-08 PROCEDURE — 1036F TOBACCO NON-USER: CPT | Performed by: INTERNAL MEDICINE

## 2023-11-08 NOTE — PROGRESS NOTES
"Chief Complaint:   Follow-up     History Of Present Illness:    Ramirez Dobbins is a 89 y.o. female presenting for followup.  Doing well from a cardiac standpoint.  Denies any progressive dyspnea or angina.  Compliant with medications.     Last Recorded Vitals:  Vitals:    11/08/23 1000   BP: 139/57   Pulse: 69   SpO2: 94%   Weight: 48.4 kg (106 lb 11.2 oz)   Height: 1.549 m (5' 1\")       Past Medical History:  She has a past medical history of Body mass index (BMI) 21.0-21.9, adult (05/24/2022), Cough, unspecified (04/05/2016), Encounter for screening mammogram for malignant neoplasm of breast, Iliotibial band syndrome, left leg (07/01/2020), Other forms of dyspnea (07/08/2021), Pain in unspecified hip (01/02/2015), Personal history of other diseases of the respiratory system (04/29/2015), Personal history of other diseases of the respiratory system (04/20/2016), Personal history of other specified conditions (06/23/2015), Personal history of urinary (tract) infections (09/01/2016), Personal history of urinary (tract) infections (11/15/2013), Sprain of unspecified ligament of left ankle, initial encounter (06/03/2016), and Sprain of unspecified part of unspecified wrist and hand, initial encounter (06/27/2013).    Past Surgical History:  She has a past surgical history that includes Cholecystectomy (05/05/2016).      Social History:  She reports that she has quit smoking. Her smoking use included cigarettes. She has been exposed to tobacco smoke. She has never used smokeless tobacco. She reports that she does not currently use alcohol. She reports that she does not use drugs.    Family History:  Family History   Problem Relation Name Age of Onset    Diabetes Father          Allergies:  Codeine, Nitrofurantoin, Penicillins, Albuterol, and Sulfamethoxazole-trimethoprim    Outpatient Medications:  Current Outpatient Medications   Medication Instructions    amLODIPine (NORVASC) 5 mg, oral, Daily    cholecalciferol " (Vitamin D-3) 50 MCG (2000 UT) tablet 1 tablet, oral, Daily    Eliquis 2.5 mg tablet 1 tablet, oral, Every 12 hours    famotidine (PEPCID) 20 mg, oral, 2 times daily    levalbuterol (Xopenex) 45 mcg/actuation inhaler 1-2 puffs, inhalation, Every 4 hours PRN    melatonin 5 mg tablet oral    metoprolol succinate XL (TOPROL-XL) 200 mg, oral, Daily    multivit-min/vit C/herb no.124 (AIRBORNE, ASCORBIC ACID, ORAL) oral    tamoxifen (Nolvadex) 20 mg tablet oral    Trelegy Ellipta 200-62.5-25 mcg blister with device INHALE 1 PUFF ,ONCE DAILY       Physical Exam:  Constitutional:       Appearance: Healthy appearance. Not in distress.   Neck:      Vascular: No JVR. JVD normal.   Pulmonary:      Effort: Pulmonary effort is normal.      Breath sounds: Normal breath sounds. No wheezing. No rhonchi. No rales.   Chest:      Chest wall: Not tender to palpatation.   Cardiovascular:      Normal rate. Regular rhythm.      Murmurs: There is no murmur.   Edema:     Peripheral edema absent.   Abdominal:      General: Bowel sounds are normal.      Palpations: Abdomen is soft.      Tenderness: There is no abdominal tenderness.   Musculoskeletal: Normal range of motion. Skin:     General: Skin is warm and dry.   Neurological:      General: No focal deficit present.      Mental Status: Alert and oriented to person, place and time.          Last Labs:  CBC -  Lab Results   Component Value Date    WBC 4.9 09/27/2023    HGB 13.2 09/27/2023    HCT 40.8 09/27/2023    MCV 97 09/27/2023     09/27/2023       CMP -  Lab Results   Component Value Date    CALCIUM 9.2 09/27/2023    PHOS 2.8 06/12/2022    PROT 6.7 09/27/2023    ALBUMIN 4.1 09/27/2023    AST 14 09/27/2023    ALT 11 09/27/2023    ALKPHOS 56 09/27/2023    BILITOT 0.3 09/27/2023       LIPID PANEL -   Lab Results   Component Value Date    CHOL 197 03/01/2022    HDL 45.1 03/01/2022    CHHDL 4.4 03/01/2022       RENAL FUNCTION PANEL -   Lab Results   Component Value Date    GLUCOSE 149  "(H) 09/27/2023     09/27/2023    K 4.1 09/27/2023     09/27/2023    CO2 25 09/27/2023    ANIONGAP 12 09/27/2023    BUN 19 09/27/2023    CREATININE 0.78 09/27/2023    CALCIUM 9.2 09/27/2023    PHOS 2.8 06/12/2022    ALBUMIN 4.1 09/27/2023        No results found for: \"BNP\", \"HGBA1C\"    Last Cardiology Tests:  Echocardiogram 7/28/2021  1. The left ventricular systolic function is normal with a 65-70% estimated  ejection fraction.  2. Spectral Doppler shows a pseudonormal pattern of left ventricular diastolic  filling.  3. Mild aortic valve stenosis.  4. There is mild aortic valve regurgitation.  5. There is mild mitral, tricuspid and pulmonic regurgitation.  6. The estimated pulmonary artery pressure is mildly elevated with the RVSP at  41.7 mmHg.        Stress 5/28/2020:  1. Normal myocardial perfusion study without evidence of ischemia or  prior infarction.  2. The left ventricle is normal in size.  3. Normal LV wall motion with an LV EF estimated at greater than 65%     Echo 4/7/16:   1. The left ventricular systolic function is normal with a 60-65% ejection  fraction.   2. Spectral Doppler shows an impaired relaxation pattern of left ventricular  diastolic filling.   3. The left atrium is normal in size.   4. The left ventricular cavity size is decreased.   5. There is mild mitral and tricuspid regurgitation.   6. The estimated pulmonary artery pressure is mildly elevated with the RVSP at  43mmHg.     5/28/2020 stress test:  Negative for ischemia    Assessment/Plan   Very pleasant 89 yo female from home with h/o PAF on apixaban, hypertension, COPD, and left acetabular fracture treated non-surgically 6/2022, right breast mass presenting today for routine follow up.   Doing well from a cardiac standpoint.  Blood pressure controlled, has been presumedly maintaining sinus rhythm.  Plan to continue current apixaban, amlodipine, Toprol-XL.  Follow-up in 6 months or sooner if needed.       Blayne Page, " MD

## 2023-11-10 ENCOUNTER — TREATMENT (OUTPATIENT)
Dept: PHYSICAL THERAPY | Facility: CLINIC | Age: 88
End: 2023-11-10
Payer: MEDICARE

## 2023-11-10 DIAGNOSIS — Z74.09 IMPAIRED MOBILITY: ICD-10-CM

## 2023-11-10 DIAGNOSIS — M54.50 LOW BACK PAIN WITHOUT SCIATICA, UNSPECIFIED BACK PAIN LATERALITY, UNSPECIFIED CHRONICITY: ICD-10-CM

## 2023-11-10 DIAGNOSIS — M81.0 AGE-RELATED OSTEOPOROSIS WITHOUT CURRENT PATHOLOGICAL FRACTURE: Primary | ICD-10-CM

## 2023-11-10 DIAGNOSIS — R53.1 WEAKNESS: ICD-10-CM

## 2023-11-10 DIAGNOSIS — S32.402S CLOSED NONDISPLACED FRACTURE OF LEFT ACETABULUM, UNSPECIFIED PORTION OF ACETABULUM, SEQUELA: ICD-10-CM

## 2023-11-10 PROCEDURE — 97110 THERAPEUTIC EXERCISES: CPT | Mod: GP,KX

## 2023-11-10 PROCEDURE — 97530 THERAPEUTIC ACTIVITIES: CPT | Mod: GP,KX

## 2023-11-10 NOTE — Clinical Note
Call Mrs Dobbins -- let her know that I ordered xrays of her back and pelvis -- her PT told me she was having increased pain. MP

## 2023-11-10 NOTE — PROGRESS NOTES
Physical Therapy    Physical Therapy Treatment    Patient Name: Ramirez Dobbins  MRN: [id number]  Today's Date: 11/10/2023  Time Calculation  Start Time: 1114  Stop Time: 1157  Time Calculation (min): 43 min      Assessment:  Patient presented to therapy with reports of increased pain, but denied increased pain with ambulation and use of sci fit this date. Patient with difficulty completing sit to supine and supine to sit (log roll technique) and required assist with LE management due to pain. Patient with TTP along R SIJ/lateral right lumbar region. No bruising present and patient denies recent falls. No other activiites completed this date. Patient and her daughter Kristy educated that PT will contact PCP regarding patient's pain and possible imaging. Patient educated to go to ED if pain worsens or WB becomes difficult. Patient reported understanding.     Plan:   Pending patient's PCP's recommendations/imaging results proceed as able and appropriate.     Current Problem  1. Weakness  Follow Up In Physical Therapy      2. Impaired mobility  Follow Up In Physical Therapy          Subjective   General   The patient reports the right side of her back is really bothering her, noting 6/10 pain today. Notes no recent falls. Notes pain has been going on for a period of about 9 days.  Took some advil and it helped. Notes she had to cancel last session because she was so bent forward. Notes walking does not make her pain worse and seems to help. Notes she gets stiff if she sits for too long. Has not done as much walking for about 9 days. Does not believe she did anything in particular to injure back. Would like to try sci fit.    Precautions  Moderate fall risk   Pain   6/10 pain right lower back at start of session. Patient did not note increased pain at end of session and left therapy in no apparent distress.     Objective   -TTP noted along right SIJ region/right lower lumbar area in left side lying. Slight swelling appears  "to be present along R SIJ. No bruising noted. Patient required assist with LEs to complete sit to supine and supine to sit due to pain in lower back. Patient reported forward lean as position of comfort. Patient with increased trunk flexion when ambulating this date.     Treatments:    Therapeutic exercises completed this date to improve strength and postural control to improve ability to perform functional activities safely.    Therapeutic Exercise  SciFit x level 1 resistance at seat 12 (minimal trunk flexion)- x 10 min with MH pad behind back with appropriate layering. Patient denied any pain with sci fit or increased pain when ambulating.     Therapeutic Activity:   -Assessment of patient's right lower lumbar area/SIJ.   -PT spoke with patient's daughter Kristy per patient request. PT updated patient's daughter on patient's reports of pain in lumbar region and plan for PT to message patient's PCP Dr. Mullins for his recommendations/possible imaging. Patient and Kristy educated that patient should go to ED if pain worsens or if she begins to have pain with WB. Patient and daughter reported understanding and agreement with this POC.     OP EDUCATION:   See above under TA. Patient educated to hold any motion or activity that causes increased pain. Patient reported understanding.     Goals:  Active       PT Problem       Pt will improve Romberg score by 2/6 score (Met)       Start:  10/03/23    Expected End:  11/13/23    Resolved:  10/23/23         Pt will improve gait mechanics evidenced by equal step length, increased WBing LLE, and LRAD in order to participate in ADL, IADL, work, and leisure skills   (Met)       Start:  10/03/23    Expected End:  11/13/23    Resolved:  10/23/23         Pt will increase strength of BLE musculature by at least 1 mm grade to participate in ADL, IADL, work, and leisure skills   (Progressing)       Start:  10/03/23    Expected End:  11/13/23            Pt will improve \"AMPAC\" score " to at least 30 for increased independence and ease with ADL/IADL's, skills, and work/leisure activities.   (Progressing)       Start:  10/03/23    Expected End:  11/13/23            Pt will improve stair negotiation by use of a reciprocal pattern with single use of HR to be able to negotiate different levels of home   (Met)       Start:  10/03/23    Expected End:  11/13/23    Resolved:  10/23/23         Pt will ambulate short household sitances ~50' with use of straight cane without LOB, mod I, equal WBing through LE's, and proper aurea to improve independence of mobility within home       Start:  10/23/23    Expected End:  12/18/23                  Resolved       Balance       Pt will reduce TUG score by at least 3 sec to decrease the risk of falls. (Met)       Start:  10/03/23    Expected End:  11/13/23    Resolved:  10/23/23

## 2023-11-11 ENCOUNTER — ANCILLARY PROCEDURE (OUTPATIENT)
Dept: RADIOLOGY | Facility: CLINIC | Age: 88
End: 2023-11-11
Payer: MEDICARE

## 2023-11-11 DIAGNOSIS — M54.50 ACUTE RIGHT-SIDED LOW BACK PAIN WITHOUT SCIATICA: ICD-10-CM

## 2023-11-11 PROCEDURE — 87086 URINE CULTURE/COLONY COUNT: CPT

## 2023-11-11 PROCEDURE — 72110 X-RAY EXAM L-2 SPINE 4/>VWS: CPT | Performed by: RADIOLOGY

## 2023-11-11 PROCEDURE — 72110 X-RAY EXAM L-2 SPINE 4/>VWS: CPT

## 2023-11-12 ENCOUNTER — LAB REQUISITION (OUTPATIENT)
Dept: LAB | Facility: HOSPITAL | Age: 88
End: 2023-11-12
Payer: MEDICARE

## 2023-11-12 DIAGNOSIS — M54.50 LOW BACK PAIN, UNSPECIFIED: ICD-10-CM

## 2023-11-13 ENCOUNTER — TELEPHONE (OUTPATIENT)
Dept: PRIMARY CARE | Facility: CLINIC | Age: 88
End: 2023-11-13

## 2023-11-13 ENCOUNTER — TREATMENT (OUTPATIENT)
Dept: PHYSICAL THERAPY | Facility: CLINIC | Age: 88
End: 2023-11-13
Payer: MEDICARE

## 2023-11-13 DIAGNOSIS — R53.1 WEAKNESS: ICD-10-CM

## 2023-11-13 DIAGNOSIS — Z74.09 IMPAIRED MOBILITY: ICD-10-CM

## 2023-11-13 LAB — BACTERIA UR CULT: NORMAL

## 2023-11-13 PROCEDURE — 97110 THERAPEUTIC EXERCISES: CPT | Mod: GP,KX

## 2023-11-13 NOTE — TELEPHONE ENCOUNTER
Called patient-spoke with her daughter-she has been doing advil and tylenol rotating every 4 hours. It seems to be helping. Also lidocaine patches to help. They are not wanting any type of opiod due to her lack of tolerance-causes memory and cognitive issues for her.   Told them to call if she needed to come in for an appt or if there was anything we could do.

## 2023-11-13 NOTE — PROGRESS NOTES
Physical Therapy    Physical Therapy Treatment    Patient Name: Ramirez Dobbins  MRN: [id number]  Today's Date: 11/13/2023  Time Calculation  Start Time: 1217  Stop Time: 1257  Time Calculation (min): 40 min      Assessment:     Patient able to complete all exercises in hooklying this date without reports of increased pain in low back or B hips. Patient was educated to follow-up with referring provider if pain continues (PT also messaged Dr. Mullins to let him know patient was continuing to report higher pain levels). Patient with slight instability when ambulating in clinic requiring CGA for safety. Patient with forward trunk lean during ambulation, able to correct with VC. Assess patient's response to this session next visit and progress program as able and tolerated by patient.   Plan:   Assess patient's pain levels and proceed with POC as able and tolerated by patient.     Current Problem  1. Weakness  Follow Up In Physical Therapy      2. Impaired mobility  Follow Up In Physical Therapy          Subjective   General     The patient reports she got imaging of her back and hip ordered by her PCP this weekend. Notes she was told there were no new fractures and Dr. Roland told her to continue with therapy.  Notes she has been walking since 4 am because walking makes it feel better. If she sits down then she gets stiff and has a hard time moving. Notes the heat pad seems to help. Notes 7-8/10 pain on bottom right side of back and lower back and both hips. Notes the pain changes around a lot.   Precautions  Fall risk, T11 compression fracture, osteoporosis     Pain   Patient noted 7-8/10 pain across low back and B hips at start of session. Notes no pain on sci fit. Patient reported no change in pain at end of session noted no pain when walking at end of session. Patient left therapy in no distress.   Objective:   Impression from imaging on 11/11/23:   Scoliosis and DJD in the lumbosacral spine as described.  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      No lumbar spine compression fracture in this exam.      However, there is interval moderate loss of height at T11, age  uncertain but perhaps acute. Clinical correlation for point  tenderness to palpation is needed.      Previous left hip arthroplasty.    Treatments:  Therapeutic Exercise:   Therapeutic exercises completed this date to improve strength and postural control to improve ability to perform functional activities safely.    Therapeutic Exercise  -SciFit x level 1 resistance at seat 12 x 10 min with MHP and appropriate towel layers   Supine/Mat: all with MHP under back and appropriate towel layering   -Hooklying iso hip adduction ball squeeze 2 x 10 reps, 5 sec hold  -Hooklying B glute set 5 sec hold 2 x 10 reps   -Hooklying marches alt R/L x 20 reps   -B SAQ with 5 sec hold x 20 reps, large bolster under knees   -Hooklying B hip abduction against light resistance orange tband 2 x 10 reps          OP EDUCATION:   Pt educated to follow up with PCP as needed for pain in back.     Goals:  Active       PT Problem       Pt will improve Romberg score by 2/6 score (Met)       Start:  10/03/23    Expected End:  11/13/23    Resolved:  10/23/23         Pt will improve gait mechanics evidenced by equal step length, increased WBing LLE, and LRAD in order to participate in ADL, IADL, work, and leisure skills   (Met)       Start:  10/03/23    Expected End:  11/13/23    Resolved:  10/23/23         Pt will increase strength of BLE musculature by at least 1 mm grade to participate in ADL, IADL, work, and leisure skills   (Progressing)       Start:  10/03/23    Expected End:  11/13/23            Pt will improve \"AMPAC\" score to at least 30 for increased independence and ease with ADL/IADL's, skills, and work/leisure activities.   (Progressing)       Start:  10/03/23    Expected End:  11/13/23            Pt will improve stair negotiation by use of a " reciprocal pattern with single use of HR to be able to negotiate different levels of home   (Met)       Start:  10/03/23    Expected End:  11/13/23    Resolved:  10/23/23         Pt will ambulate short household sitances ~50' with use of straight cane without LOB, mod I, equal WBing through LE's, and proper aurea to improve independence of mobility within home       Start:  10/23/23    Expected End:  12/18/23                  Resolved       Balance       Pt will reduce TUG score by at least 3 sec to decrease the risk of falls. (Met)       Start:  10/03/23    Expected End:  11/13/23    Resolved:  10/23/23

## 2023-11-15 ENCOUNTER — TREATMENT (OUTPATIENT)
Dept: PHYSICAL THERAPY | Facility: CLINIC | Age: 88
End: 2023-11-15
Payer: MEDICARE

## 2023-11-15 DIAGNOSIS — Z74.09 IMPAIRED MOBILITY: ICD-10-CM

## 2023-11-15 DIAGNOSIS — R53.1 WEAKNESS: ICD-10-CM

## 2023-11-15 DIAGNOSIS — Z74.09 IMPAIRED MOBILITY: Primary | ICD-10-CM

## 2023-11-15 PROCEDURE — 97530 THERAPEUTIC ACTIVITIES: CPT | Mod: 59,KX,GP

## 2023-11-15 PROCEDURE — 97110 THERAPEUTIC EXERCISES: CPT | Mod: GP,KX

## 2023-11-15 ASSESSMENT — PAIN - FUNCTIONAL ASSESSMENT: PAIN_FUNCTIONAL_ASSESSMENT: 0-10

## 2023-11-15 ASSESSMENT — PAIN SCALES - GENERAL: PAINLEVEL_OUTOF10: 0 - NO PAIN

## 2023-11-17 NOTE — PROGRESS NOTES
"Physical Therapy    Physical Therapy Treatment    Patient Name: Ramirez Dobbins  MRN: [id number]  Today's Date: 11/20/2023     Time in: 12:15 p  Time out: 12:56    Assessment:   Patient with improved ability to complete sit to supine and supine to sit transfers without requiring UE support to lift LEs. Patient did not report any increased pain while completing exercises this date, and noted she felt good after completing sci fit. Patient with good technique requiring minimal verbal cueing. Assess patient's response to this session next visit and progress program as able and tolerated.     Plan:   Progress POC as able and tolerated by patient.     Current Problem  1. Weakness  Follow Up In Physical Therapy      2. Impaired mobility  Follow Up In Physical Therapy          General          Subjective    The patient reports she is continuing to have pain in low back, notes it is switching from right to left side. Notes no recent falls. Notes she was able to walk a little better today. Notes 3/10 pain in low back in middle of back, down low, \"but it changes all the time.\"   Notes she felt okay last session, notes it was a good workout.    Precautions   Moderate fall risk   Pain   Pt notes 3/10 pain across low back, mostly in center of back at start of session. Pt noted same 3/10 pain at end of session across low back, left therapy in no distress.     Treatments:  Therapeutic Exercise  Therapeutic Exercise Activity 1: Sci fit, seat 11 L1 x 10 min, BUEs and BLEs; MHP behind back with appropriate layering  Therapeutic Exercise Activity 2: Hooklying B hip iso adduction ball squeeze 2 x 15 reps, 5 sec hold  Therapeutic Exercise Activity 3: Hooklying B glute set 5 sec hold 2 x 15 reps  Therapeutic Exercise Activity 4: R/L SAQ with 2# ankle weights 2 x 15 reps each, 2# ankle weights, large bolster under knees  Therapeutic Exercise Activity 5: B hip abduction against orange tband 2 x 15 reps, 5 sec hold  Therapeutic Exercise " "Activity 6: Hooklying marches against orange tband above knees x 20 reps alt R/L  Therapeutic Exercise Activity 7: Ambulation around clinci with FWW to help loosen back up x 100 feet; SBA  Therapeutic Exercise Activity 8: \         OP EDUCATION:   Patient educated on correct exercise technique and to complete HEP in pain free ROM only and to hold any exercises that cause increased pain.     Goals:  Active       PT Problem       Pt will improve Romberg score by 2/6 score (Met)       Start:  10/03/23    Expected End:  11/13/23    Resolved:  10/23/23         Pt will improve gait mechanics evidenced by equal step length, increased WBing LLE, and LRAD in order to participate in ADL, IADL, work, and leisure skills   (Met)       Start:  10/03/23    Expected End:  11/13/23    Resolved:  10/23/23         Pt will increase strength of BLE musculature by at least 1 mm grade to participate in ADL, IADL, work, and leisure skills   (Progressing)       Start:  10/03/23    Expected End:  11/13/23            Pt will improve \"AMPAC\" score to at least 30 for increased independence and ease with ADL/IADL's, skills, and work/leisure activities.   (Progressing)       Start:  10/03/23    Expected End:  11/13/23            Pt will improve stair negotiation by use of a reciprocal pattern with single use of HR to be able to negotiate different levels of home   (Met)       Start:  10/03/23    Expected End:  11/13/23    Resolved:  10/23/23         Pt will ambulate short household sitances ~50' with use of straight cane without LOB, mod I, equal WBing through LE's, and proper aurea to improve independence of mobility within home       Start:  10/23/23    Expected End:  12/18/23                  Resolved       Balance       Pt will reduce TUG score by at least 3 sec to decrease the risk of falls. (Met)       Start:  10/03/23    Expected End:  11/13/23    Resolved:  10/23/23           "

## 2023-11-18 DIAGNOSIS — N30.00 ACUTE CYSTITIS WITHOUT HEMATURIA: ICD-10-CM

## 2023-11-20 ENCOUNTER — TREATMENT (OUTPATIENT)
Dept: PHYSICAL THERAPY | Facility: CLINIC | Age: 88
End: 2023-11-20
Payer: MEDICARE

## 2023-11-20 DIAGNOSIS — Z74.09 IMPAIRED MOBILITY: ICD-10-CM

## 2023-11-20 DIAGNOSIS — R53.1 WEAKNESS: ICD-10-CM

## 2023-11-20 PROCEDURE — 97110 THERAPEUTIC EXERCISES: CPT | Mod: GP,KX

## 2023-11-20 RX ORDER — DOXYCYCLINE 100 MG/1
100 CAPSULE ORAL 2 TIMES DAILY
Qty: 14 CAPSULE | Refills: 0 | Status: SHIPPED | OUTPATIENT
Start: 2023-11-20 | End: 2023-11-27

## 2023-11-21 ENCOUNTER — TELEPHONE (OUTPATIENT)
Dept: PRIMARY CARE | Facility: CLINIC | Age: 88
End: 2023-11-21
Payer: MEDICARE

## 2023-11-21 DIAGNOSIS — S32.402S CLOSED NONDISPLACED FRACTURE OF LEFT ACETABULUM, UNSPECIFIED PORTION OF ACETABULUM, SEQUELA: Primary | ICD-10-CM

## 2023-11-21 RX ORDER — TRAMADOL HYDROCHLORIDE 50 MG/1
50 TABLET ORAL EVERY 6 HOURS PRN
Qty: 28 TABLET | Refills: 0 | Status: SHIPPED | OUTPATIENT
Start: 2023-11-21 | End: 2023-12-11

## 2023-11-21 NOTE — TELEPHONE ENCOUNTER
Pt daughter called stated that the patient is still in some pain from her compression fracture and is asking if she can get Tramadol that seemed to work when they got it from the [facility] and then she will; take tylenol in between

## 2023-11-21 NOTE — PROGRESS NOTES
Tramadol 50 mg 1 po q6h prn acute pain interfering with walking and PT.  #28 for 7 day supply. MP    I have personally reviewed the [facility] report for this patient.  I have considered the risks of abuse, dependence, addiction, and diversion.  I believe that it is clinically appropriate for this patient to be prescribed this medication based on the documented diagnosis.  Aguila Mullins MD

## 2023-11-21 NOTE — PROGRESS NOTES
Physical Therapy    Physical Therapy Treatment    Patient Name: Ramirez Dobbins  MRN: [id number]  Today's Date: 11/22/2023  Time Calculation  Start Time: 1102  Stop Time: 1149  Time Calculation (min): 47 min      Assessment:   Trialed adding back in standing exercises this date, with patient noting no increased pain while completing exercises, but she did note increased pain across low back when she went to walk from // bars to mat table. Patient with good postural positioning when completing standing exercises, but continues to demonstrate forward trunk lean when ambulating with FWW. Patient appeared to tolerate supine/hooklying exercises well, noting no increased pain. Assess patient's response to this session next visit and progress program as able and tolerated by patient in order to address goals.     Plan:   Progress POC as able and tolerated by patient.     Current Problem  1. Weakness  Follow Up In Physical Therapy      2. Impaired mobility  Follow Up In Physical Therapy          General          Subjective    The patient reports 2/10 pain on low back today. Notes she had some muscle soreness in hips and thighs following last session but no increased pain. Notes she was very sore this morning and stiff, notes she tried to walk it off and it did not help.     Precautions   Fall risk     Pain   2/10 pain across low back at start of session. Pt noted no change in pain at end of session and left therapy in no distress.       Treatments:  Therapeutic Exercises:   Sci fit, seat 10, L1 x 10 minutes with MHP behind back with appropriate towel layering  Standing in // bars with BUE support:   -B heel/toe raises x 20 reps   -Side stepping in // bars x 2 laps BUE support  **Patient noted no increased pain during standing exercises, but noted incresaed pain in back following exercises.   Hooklying: with MHP behind back with appropriate towel layering  -Glute set 5 sec hold 2 x 10 reps  -B hip adduction isometric 2 x 10  "reps, 5 sec hold  -B SAQ with large bolster under knees 2 x 10 reps, 5 sechold  -B hip abduction against orange tband 2 x 10 reps, 5 sechold  -Alt R/L hooklying marches against orange tband above knees x 20 reps   -Ambulation around clinic with FWW x 100 feet     OP EDUCATION:   Patient educated on importance of completing exercises in pain free ROM only and holding any part of clinic program or home exercises that cause increased pain.    Goals:  Active       PT Problem       Pt will improve Romberg score by 2/6 score (Met)       Start:  10/03/23    Expected End:  11/13/23    Resolved:  10/23/23         Pt will improve gait mechanics evidenced by equal step length, increased WBing LLE, and LRAD in order to participate in ADL, IADL, work, and leisure skills   (Met)       Start:  10/03/23    Expected End:  11/13/23    Resolved:  10/23/23         Pt will increase strength of BLE musculature by at least 1 mm grade to participate in ADL, IADL, work, and leisure skills   (Progressing)       Start:  10/03/23    Expected End:  11/13/23            Pt will improve \"AMPAC\" score to at least 30 for increased independence and ease with ADL/IADL's, skills, and work/leisure activities.   (Progressing)       Start:  10/03/23    Expected End:  11/13/23            Pt will improve stair negotiation by use of a reciprocal pattern with single use of HR to be able to negotiate different levels of home   (Met)       Start:  10/03/23    Expected End:  11/13/23    Resolved:  10/23/23         Pt will ambulate short household sitances ~50' with use of straight cane without LOB, mod I, equal WBing through LE's, and proper aurea to improve independence of mobility within home       Start:  10/23/23    Expected End:  12/18/23                  Resolved       Balance       Pt will reduce TUG score by at least 3 sec to decrease the risk of falls. (Met)       Start:  10/03/23    Expected End:  11/13/23    Resolved:  10/23/23           " 21-Mar-2022 19:30

## 2023-11-22 ENCOUNTER — TREATMENT (OUTPATIENT)
Dept: PHYSICAL THERAPY | Facility: CLINIC | Age: 88
End: 2023-11-22
Payer: MEDICARE

## 2023-11-22 DIAGNOSIS — R53.1 WEAKNESS: Primary | ICD-10-CM

## 2023-11-22 DIAGNOSIS — Z74.09 IMPAIRED MOBILITY: ICD-10-CM

## 2023-11-22 PROCEDURE — 97110 THERAPEUTIC EXERCISES: CPT | Mod: GP,KX

## 2023-11-27 ENCOUNTER — TREATMENT (OUTPATIENT)
Dept: PHYSICAL THERAPY | Facility: CLINIC | Age: 88
End: 2023-11-27
Payer: MEDICARE

## 2023-11-27 DIAGNOSIS — Z74.09 IMPAIRED MOBILITY: ICD-10-CM

## 2023-11-27 DIAGNOSIS — R53.1 WEAKNESS: ICD-10-CM

## 2023-11-27 PROCEDURE — 97110 THERAPEUTIC EXERCISES: CPT | Mod: GP,KX,CQ

## 2023-11-27 NOTE — PROGRESS NOTES
"Physical Therapy    Physical Therapy Treatment    Patient Name: Ramirez Dobbins  MRN: [id number]  Today's Date: 11/27/2023  Time Calculation  Start Time: 1215  Stop Time: 1300  Time Calculation (min): 45 min      Assessment:  Good response to previous  session  continue to assess tolerance each  visit and progress program as able and tolerated by patient in order to address goals.     Plan:   Progress POC as able and tolerated by patient.     Current Problem  1. Weakness  Follow Up In Physical Therapy      2. Impaired mobility  Follow Up In Physical Therapy          Subjective    The patient reports 0/10 pain on low back today but  notes she has increase ache /pain in B hips Pt states \"I had to walk around for 2 hours so to be able to move\". Pt felt good  following last session , no increased pain. Notes she was very sore this morning and stiff angelo. when first getting out of bed noting she is quite bent over and has to walk with looking up to adjust .     Precautions   Fall risk     Pain  Upon arrival 0/10 pain across low back at start of session.  5/10 B hips .  0/10 end of session with WBing /walking    Treatments:  Therapeutic exercises completed this date to improve strength and postural control to improve ability to perform functional activities safely.  Therapeutic Exercises:   Sci fit, seat 10, L1 x 10 minutes with MHP behind back with appropriate towel layering  Standing in // bars with BUE support:   -B heel/toe raises x 20 reps   -Side stepping in // bars x 5 laps BUE support  4\" riser step up L lead , step up R lead    -Glute set 5 sec hold 2 x 10 reps  -B hip adduction isometric 2 x 10 reps, 5 sec hold  -B SAQ with large bolster under knees  orange t-band around knees 2 x 10 reps, 5 sec hold  -B hip abduction against orange tband 2 x 10 reps, 5 sec hold  -Alt R/L hooklying marches against orange tband above knees x 20 reps       OP EDUCATION:  Re instructed pt to perform HEP without increasing " "pain/symptoms    Goals:  Active       PT Problem       Pt will improve Romberg score by 2/6 score (Met)       Start:  10/03/23    Expected End:  11/13/23    Resolved:  10/23/23         Pt will improve gait mechanics evidenced by equal step length, increased WBing LLE, and LRAD in order to participate in ADL, IADL, work, and leisure skills   (Met)       Start:  10/03/23    Expected End:  11/13/23    Resolved:  10/23/23         Pt will increase strength of BLE musculature by at least 1 mm grade to participate in ADL, IADL, work, and leisure skills   (Progressing)       Start:  10/03/23    Expected End:  11/13/23            Pt will improve \"AMPAC\" score to at least 30 for increased independence and ease with ADL/IADL's, skills, and work/leisure activities.   (Progressing)       Start:  10/03/23    Expected End:  11/13/23            Pt will improve stair negotiation by use of a reciprocal pattern with single use of HR to be able to negotiate different levels of home   (Met)       Start:  10/03/23    Expected End:  11/13/23    Resolved:  10/23/23         Pt will ambulate short household sitances ~50' with use of straight cane without LOB, mod I, equal WBing through LE's, and proper aurea to improve independence of mobility within home       Start:  10/23/23    Expected End:  12/18/23                  Resolved       Balance       Pt will reduce TUG score by at least 3 sec to decrease the risk of falls. (Met)       Start:  10/03/23    Expected End:  11/13/23    Resolved:  10/23/23           "

## 2023-11-28 NOTE — PROGRESS NOTES
"Physical Therapy    Physical Therapy Treatment    Patient Name: Ramirez Dobbins  MRN: [id number]  Today's Date: 11/28/2023         Assessment:   pt able to demonstrate a proper log roll with cueing, no difficulty performing ther ex this date,   Plan:   Continue general strengthening    Current Problem  1. Weakness  Follow Up In Physical Therapy      2. Impaired mobility  Follow Up In Physical Therapy          General   pt reports having pain with getting OOB but no pain at the moment        Subjective    Precautions   Lumbar comp Fx   0/10 pain      Objective     Therapeutic Exercise  Therapeutic Exercise Activity 1: sci fit level 1.0 x8 mins  Therapeutic Exercise Activity 2: supine hip adduction squeezes 2x25  Therapeutic Exercise Activity 3: glute sets 2x25  Therapeutic Exercise Activity 4: SAQs 2x25  Therapeutic Exercise Activity 5: sidelying clamshells 2x25  Therapeutic Exercise Activity 6: single leg heel raises until fatigue 2x8-12 each LE    Therapeutic Activity  Therapeutic Activity 1: practice bed mobility with log roll for comp fx x4 tactile cueing to maintain posture    Bed Mobility 1  Bed Mobility 1: Supine to sitting, Sitting to supine, Log roll  Level of Assistance 1: Contact guard, Minimal tactile cues    OP EDUCATION:   discussed reasoning for log roll with comp fx     Goals:  Active       PT Problem       Pt will improve Romberg score by 2/6 score (Met)       Start:  10/03/23    Expected End:  11/13/23    Resolved:  10/23/23         Pt will improve gait mechanics evidenced by equal step length, increased WBing LLE, and LRAD in order to participate in ADL, IADL, work, and leisure skills   (Met)       Start:  10/03/23    Expected End:  11/13/23    Resolved:  10/23/23         Pt will increase strength of BLE musculature by at least 1 mm grade to participate in ADL, IADL, work, and leisure skills   (Progressing)       Start:  10/03/23    Expected End:  11/13/23            Pt will improve \"AMPAC\" score to at " least 30 for increased independence and ease with ADL/IADL's, skills, and work/leisure activities.   (Progressing)       Start:  10/03/23    Expected End:  11/13/23            Pt will improve stair negotiation by use of a reciprocal pattern with single use of HR to be able to negotiate different levels of home   (Met)       Start:  10/03/23    Expected End:  11/13/23    Resolved:  10/23/23         Pt will ambulate short household sitances ~50' with use of straight cane without LOB, mod I, equal WBing through LE's, and proper aurea to improve independence of mobility within home (Progressing)       Start:  10/23/23    Expected End:  12/18/23                  Resolved       Balance       Pt will reduce TUG score by at least 3 sec to decrease the risk of falls. (Met)       Start:  10/03/23    Expected End:  11/13/23    Resolved:  10/23/23

## 2023-11-28 NOTE — PROGRESS NOTES
Physical Therapy    Physical Therapy Treatment/Re-Check/Discharge Summary     Patient Name: Ramirez Dobbins  MRN: [id number]  Today's Date: 11/29/2023  Time Calculation  Start Time: 1148  Stop Time: 1235  Time Calculation (min): 47 min      Assessment:  PT Assessment  PT Assessment Results: Decreased strength, Decreased mobility, Decreased range of motion  The patient is a 89 y/o female who has attended 15 sessions of skilled physical therapy to address generalized weakness and impaired balance. The patient subjectively reports compliance with HEP. The patient's progress in physical therapy was impeded by onset of back pain, with patient noting no JOSH. Patient notes pain fluctuates, but is increased with bed mobility, lifting RLE into bed, and during ADLs/IADLs. Patient appears to have maintained most LE strength since IE, with exception of R hip flexion, noting sharply increased pain in low back with R hip flexion. At this time, the patient will be discharged from skilled PT services due to lack of progress as well as reports of continued pain. Patient and daughter are in agreement with this POC.     Patient left therapy in no distress with daughter.     Plan:  OP PT Plan  PT Plan: No Additional PT interventions required at this time (Plan to refer patient back to referring provider, Dr. Mullins due to onset of pain and pain limiting patient's ability to complete ADLs/IADLs and functional mobility activities. Patient and rKisty gonzalez are in agreement with this POC.)  Plan of Care Agreement: Patient    Current Problem  1. Weakness  Follow Up In Physical Therapy      2. Impaired mobility  Follow Up In Physical Therapy          General        The patient reports her pain seems to really move around. Notes her pain was 6-7/10 pain. Notes currently pain in on bilateral sides of lower back. Notes pain has been along anterior abdomen and B sides of hips as well. Notes she has been going to the bathroom regularly. Notes  "she feels a lot of pain in the morning when she gets up, has a hard time standing up straight, feels like she has to bend forward. Notes she gets a sharp pain in side when she lifts legs up.       Subjective    Objective   Ortho   Observation   Posture: poor  Observed Posture Impairment: rounded shoulders, thoracic kyphosis - in both seated and standing positions  ROM / Joint Mobility   (Range of Motion in degrees)   Hip:   R hip AROM flexion limited by pain in back in seated position     Transitions and Transfers   Functional Mobility Assessment: independent from sit to supine, min A from supine to sit, independent rolling, modified independent from sit to stand, modified independent from stand to sit.   Patient noted sharp pain across low back when completing sit to supine.     Gait / Mobility     Gait: The patient presents to therapy ambulating with FWW. Patient with forward trunk lean, slightly crouched knees position. Very narrow LINDSEY. Decreased B step length. Decreased gait speed.     Strength   Hip:   Able to complete bridge in partial ROM, increased pain across low back     (Key: \"P!\" Denotes Pain with Movement)   Extension: 4-/5 on right and 4-/5 on left.   Flexion: 3+/5 (sharp pain across low back)  on right, seated, 4/5 on left and seated.   Abduction: 4/5 on right, seated, 4/5 on left and seated.   Adduction: 4/5 on right, seated, 4/5 on left and seated.   Knee:   (Key: \" P! \" Denotes Pain with Movement)   Knee extension was 4+/5 on right, 4+/5 on left.  (Pain on the right side of back when kicking knee straight)   Knee flexion was 4/5 on right, 4/5 on left.   Foot and Ankle:   (Key: \" P! \" Denotes Pain with Movement)   Foot dorsiflexion was 5/5 on right, 5/5 on left.   Foot plantar flexion was 4+/5 on right, 4+/5 on left.   Balance and Coordination   Seated Balance: supported: good, unsupported: good.   Static Standing Balance:.   Romberg test: 3/6.  Outcome Measures (TU.45 seconds with  FWW   [facility] " Basic Mobility Outpatient Short Form:: 23 points    Flexibility:   -Good L hamstring   -Fair R hamstring     Treatments:  Therapeutic Exercise:   Sci fit, seat 11, L1 x 10 minutes with MHP (appropriate layering) for active warm up     Therapeutic Activity:   -Re-check completed this date. Please see goals and objective for details. Patient and daughter educated on outcomes of re-check and POC going forward.    OP EDUCATION:  Patient educated to continue completing HEP within pain free ROM only and to hold HEP if it causes increased pain. Patient also educated on walker safety and keeping walker in front of her when completing transfers and during ambulation. Pt educated to contact PT clinic with any questions regarding HEP. Patient reported understanding.     Outpatient Education  Individual(s) Educated: Patient, Child  Education Provided: Home Safety, Home Exercise Program, POC  Risk and Benefits Discussed with Patient/Caregiver/Other: yes  Patient/Caregiver Demonstrated Understanding: yes  Plan of Care Discussed and Agreed Upon: yes  Patient Response to Education: Patient/Caregiver Verbalized Understanding of Information    Goals:  Resolved       Balance       Pt will reduce TUG score by at least 3 sec to decrease the risk of falls. (Met)       Start:  10/03/23    Expected End:  11/13/23    Resolved:  10/23/23            PT Problem       Pt will improve Romberg score by 2/6 score (Met)       Start:  10/03/23    Expected End:  11/13/23    Resolved:  10/23/23         Pt will improve gait mechanics evidenced by equal step length, increased WBing LLE, and LRAD in order to participate in ADL, IADL, work, and leisure skills   (Met)       Start:  10/03/23    Expected End:  11/13/23    Resolved:  10/23/23         Pt will increase strength of BLE musculature by at least 1 mm grade to participate in ADL, IADL, work, and leisure skills   (Not Progressing)       Start:  10/03/23    Expected End:  11/13/23    Resolved:   "11/29/23         Pt will improve \"AMPAC\" score to at least 30 for increased independence and ease with ADL/IADL's, skills, and work/leisure activities.   (Not Progressing)       Start:  10/03/23    Expected End:  11/13/23    Resolved:  11/29/23      Goal Note       NOT MET               Pt will improve stair negotiation by use of a reciprocal pattern with single use of HR to be able to negotiate different levels of home   (Met)       Start:  10/03/23    Expected End:  11/13/23    Resolved:  10/23/23         Pt will ambulate short household sitances ~50' with use of straight cane without LOB, mod I, equal WBing through LE's, and proper aurea to improve independence of mobility within home (Not Progressing)       Start:  10/23/23    Expected End:  12/18/23    Resolved:  11/29/23               "

## 2023-11-29 ENCOUNTER — TREATMENT (OUTPATIENT)
Dept: PHYSICAL THERAPY | Facility: CLINIC | Age: 88
End: 2023-11-29
Payer: MEDICARE

## 2023-11-29 DIAGNOSIS — R53.1 WEAKNESS: ICD-10-CM

## 2023-11-29 DIAGNOSIS — Z74.09 IMPAIRED MOBILITY: ICD-10-CM

## 2023-11-29 PROCEDURE — 97530 THERAPEUTIC ACTIVITIES: CPT | Mod: GP,KX

## 2023-11-29 PROCEDURE — 97110 THERAPEUTIC EXERCISES: CPT | Mod: GP,KX

## 2023-12-10 DIAGNOSIS — S32.402S CLOSED NONDISPLACED FRACTURE OF LEFT ACETABULUM, UNSPECIFIED PORTION OF ACETABULUM, SEQUELA: ICD-10-CM

## 2023-12-11 RX ORDER — TRAMADOL HYDROCHLORIDE 50 MG/1
50 TABLET ORAL EVERY 6 HOURS PRN
Qty: 56 TABLET | Refills: 5 | Status: SHIPPED | OUTPATIENT
Start: 2023-12-11 | End: 2024-03-10

## 2023-12-15 ENCOUNTER — OFFICE VISIT (OUTPATIENT)
Dept: PRIMARY CARE | Facility: CLINIC | Age: 88
End: 2023-12-15
Payer: MEDICARE

## 2023-12-15 VITALS
RESPIRATION RATE: 16 BRPM | WEIGHT: 104.8 LBS | DIASTOLIC BLOOD PRESSURE: 62 MMHG | BODY MASS INDEX: 19.8 KG/M2 | OXYGEN SATURATION: 94 % | HEART RATE: 62 BPM | SYSTOLIC BLOOD PRESSURE: 140 MMHG | TEMPERATURE: 98.3 F

## 2023-12-15 DIAGNOSIS — M16.51 POST-TRAUMATIC OSTEOARTHRITIS OF RIGHT HIP: ICD-10-CM

## 2023-12-15 DIAGNOSIS — M47.818 SI JOINT ARTHRITIS: Primary | ICD-10-CM

## 2023-12-15 PROCEDURE — 3078F DIAST BP <80 MM HG: CPT | Performed by: FAMILY MEDICINE

## 2023-12-15 PROCEDURE — 99214 OFFICE O/P EST MOD 30 MIN: CPT | Performed by: FAMILY MEDICINE

## 2023-12-15 PROCEDURE — 1126F AMNT PAIN NOTED NONE PRSNT: CPT | Performed by: FAMILY MEDICINE

## 2023-12-15 PROCEDURE — 1160F RVW MEDS BY RX/DR IN RCRD: CPT | Performed by: FAMILY MEDICINE

## 2023-12-15 PROCEDURE — 1036F TOBACCO NON-USER: CPT | Performed by: FAMILY MEDICINE

## 2023-12-15 PROCEDURE — 1159F MED LIST DOCD IN RCRD: CPT | Performed by: FAMILY MEDICINE

## 2023-12-15 PROCEDURE — 3077F SYST BP >= 140 MM HG: CPT | Performed by: FAMILY MEDICINE

## 2023-12-15 RX ORDER — OXYCODONE AND ACETAMINOPHEN 7.5; 325 MG/1; MG/1
0.5 TABLET ORAL EVERY 4 HOURS PRN
Qty: 21 TABLET | Refills: 0 | Status: SHIPPED | OUTPATIENT
Start: 2023-12-15 | End: 2023-12-22

## 2023-12-15 RX ORDER — METHOCARBAMOL 500 MG/1
250 TABLET, FILM COATED ORAL 3 TIMES DAILY PRN
Qty: 40 TABLET | Refills: 2 | Status: SHIPPED | OUTPATIENT
Start: 2023-12-15 | End: 2024-04-17 | Stop reason: SDUPTHER

## 2023-12-15 NOTE — ASSESSMENT & PLAN NOTE
12/15/23  OA pain (Right SI joint fusion per XRAY 11/11/2023) no longer controlled with tylenol and ibuprofen.  Willing to try low dose oxycodone (had intolerance to codeine and tramadol ineffective).   Safe to continue PT/OT.  Xrays negative for fx.     OARRS reviewed 12/15/23   Short term supply of percocet 5/325 1/2 q4 as needed #21    Continue HEP she learned from PT.   Refer to Dr Temple for possible SI joint injection or TPI.

## 2023-12-15 NOTE — PROGRESS NOTES
Subjective   Patient ID: Ramirez Dobbins is a 89 y.o. female who presents for Back Pain.  Right hip and low back pain interfering with PT.   Hx of left acetabular fx 2022, fell with hip pain summer 2023.      Stiff and bent forward in am.  Has to walk for about 2 hours to get striaghtened up      OARRS:  Aguila Mullins MD on 12/15/2023 12:40 PM  I have personally reviewed the OARRS report for Ramirez Dobbins. I have considered the risks of abuse, dependence, addiction and diversion and I have the following concerns no risks    Is the patient prescribed a combination of a benzodiazepine and opioid?  No    Last Urine Drug Screen / ordered today: Yes  No results found for this or any previous visit (from the past 8760 hour(s)).  N/A    Clinical rationale for not completing a Urine Drug Screen: 89 yo woman does not need to leave sampl.[email or web address]      Controlled Substance Agreement:  Date of the Last Agreement: 12/15/23  Reviewed Controlled Substance Agreement including but not limited to the benefits, risks, and alternatives to treatment with a Controlled Substance medication(s).    Opioids:  What is the patient's goal of therapy? Pain relief  Is this being achieved with current treatment? Just startign    I have calculated the patient's Morphine Dose Equivalent (MED): 22.5 max  I have considered referral to Pain Management and/or a specialist, and do not feel it is necessary at this time.    I feel that it is clinically indicated to continue this current medication regimen after consideration of alternative therapies, and other non-opioid treatment.    Opioid Risk Screening:  No data recorded    Pain Assessment:  No data recorded  no risk for addiction.     Objective   Visit Vitals  /62 (BP Location: Right arm, Patient Position: Sitting, BP Cuff Size: Adult)   Pulse 62   Temp 36.8 °C (98.3 °F) (Temporal)   Resp 16   Wt 47.5 kg (104 lb 12.8 oz)   SpO2 94%   BMI 19.80 kg/m²   Smoking Status Former   BSA 1.43 m²      O: VSS  "AFEB Awake, Alert, NAD.  No intoxication, withdrawal, or sedation.  Chest CTA.  Heart RRR.  Ext no c/c/e.   TTP right SI joint and paralumbar muscle.  No limited rom.     Lab Results   Component Value Date    WBC 4.9 09/27/2023    HGB 13.2 09/27/2023    HCT 40.8 09/27/2023    MCV 97 09/27/2023     09/27/2023       Chemistry    Lab Results   Component Value Date/Time     09/27/2023 1600    K 4.1 09/27/2023 1600     09/27/2023 1600    CO2 25 09/27/2023 1600    BUN 19 09/27/2023 1600    CREATININE 0.78 09/27/2023 1600    Lab Results   Component Value Date/Time    CALCIUM 9.2 09/27/2023 1600    ALKPHOS 56 09/27/2023 1600    AST 14 09/27/2023 1600    ALT 11 09/27/2023 1600    BILITOT 0.3 09/27/2023 1600          Lab Results   Component Value Date    CHOL 197 03/01/2022     Lab Results   Component Value Date    HDL 45.1 03/01/2022     No results found for: \"LDLCALC\"  No results found for: \"TRIG\"  No components found for: \"CHOLHDL\"   No results found for: \"HGBA1C\"    Assessment/Plan   Problem List Items Addressed This Visit       Osteoarthritis of hip    Overview     11/14/17 ORTHO -- Echevarria -- Lumbar DJD, DDD -- Medrol dose pack. Completed PT for pelvic fracture 12/2017.  8/3/19, 8/14/18 SPINE -- Kellis -- CRIS x 1    # B/L hand and finger OA with left index DIP ganglion and right ring PIP ganglion and synovitis-- referred to Dr Martín cobos 2021.    # Hx IT band syndrome -- Improved with Stretching handout provided 7/1/2020 for IT band syndrome.    # Left hip fx s/p NESS 11/2014 -- continue HEP.   BUT NO MORE NSAIDS due to being on AC with pradaxa.     S/p Left acetabular fx.   2022             Current Assessment & Plan     12/15/23  OA pain (Right SI joint fusion per XRAY 11/11/2023) no longer controlled with tylenol and ibuprofen.  Willing to try low dose oxycodone (had intolerance to codeine and tramadol ineffective).   Safe to continue PT/OT.  Xrays negative for fx.     OARRS reviewed 12/15/23   Short " term supply of percocet 5/325 1/2 q4 as needed #21    Continue HEP she learned from PT.   Refer to Dr Temple for possible SI joint injection or TPI.           Relevant Medications    oxyCODONE-acetaminophen (Percocet) 7.5-325 mg tablet    methocarbamol (Robaxin) 500 mg tablet     Other Visit Diagnoses       SI joint arthritis    -  Primary    Relevant Medications    oxyCODONE-acetaminophen (Percocet) 7.5-325 mg tablet    methocarbamol (Robaxin) 500 mg tablet    Other Relevant Orders    Referral to Physical Medicine Rehab    Referral to Physical Therapy

## 2023-12-19 ENCOUNTER — HOSPITAL ENCOUNTER (OUTPATIENT)
Dept: RADIOLOGY | Facility: HOSPITAL | Age: 88
Discharge: HOME | End: 2023-12-19
Payer: MEDICARE

## 2023-12-19 ENCOUNTER — ANCILLARY PROCEDURE (OUTPATIENT)
Dept: RADIOLOGY | Facility: HOSPITAL | Age: 88
End: 2023-12-19
Payer: MEDICARE

## 2023-12-19 DIAGNOSIS — C50.411 MALIGNANT NEOPLASM OF UPPER-OUTER QUADRANT OF RIGHT FEMALE BREAST (MULTI): ICD-10-CM

## 2023-12-19 DIAGNOSIS — C50.919 MALIGNANT NEOPLASM OF UNSPECIFIED SITE OF UNSPECIFIED FEMALE BREAST (MULTI): ICD-10-CM

## 2023-12-19 PROCEDURE — 77066 DX MAMMO INCL CAD BI: CPT | Mod: BILATERAL PROCEDURE | Performed by: RADIOLOGY

## 2023-12-19 PROCEDURE — 77066 DX MAMMO INCL CAD BI: CPT

## 2023-12-19 PROCEDURE — G0279 TOMOSYNTHESIS, MAMMO: HCPCS | Mod: BILATERAL PROCEDURE | Performed by: RADIOLOGY

## 2023-12-22 DIAGNOSIS — I48.0 PAROXYSMAL ATRIAL FIBRILLATION (MULTI): Primary | ICD-10-CM

## 2023-12-26 ENCOUNTER — APPOINTMENT (OUTPATIENT)
Dept: HEMATOLOGY/ONCOLOGY | Facility: CLINIC | Age: 88
End: 2023-12-26
Payer: MEDICARE

## 2023-12-27 ENCOUNTER — OFFICE VISIT (OUTPATIENT)
Dept: HEMATOLOGY/ONCOLOGY | Facility: CLINIC | Age: 88
End: 2023-12-27
Payer: MEDICARE

## 2023-12-27 VITALS
SYSTOLIC BLOOD PRESSURE: 121 MMHG | WEIGHT: 106.92 LBS | BODY MASS INDEX: 20.2 KG/M2 | HEART RATE: 72 BPM | DIASTOLIC BLOOD PRESSURE: 74 MMHG | TEMPERATURE: 98.1 F | RESPIRATION RATE: 18 BRPM | OXYGEN SATURATION: 92 %

## 2023-12-27 DIAGNOSIS — M81.0 OSTEOPOROSIS, UNSPECIFIED OSTEOPOROSIS TYPE, UNSPECIFIED PATHOLOGICAL FRACTURE PRESENCE: ICD-10-CM

## 2023-12-27 DIAGNOSIS — E55.9 VITAMIN D DEFICIENCY: ICD-10-CM

## 2023-12-27 DIAGNOSIS — C50.411 MALIGNANT NEOPLASM OF UPPER-OUTER QUADRANT OF RIGHT BREAST IN FEMALE, ESTROGEN RECEPTOR POSITIVE (MULTI): Primary | ICD-10-CM

## 2023-12-27 DIAGNOSIS — Z17.0 MALIGNANT NEOPLASM OF UPPER-OUTER QUADRANT OF RIGHT BREAST IN FEMALE, ESTROGEN RECEPTOR POSITIVE (MULTI): Primary | ICD-10-CM

## 2023-12-27 LAB
ALBUMIN SERPL BCP-MCNC: 4.1 G/DL (ref 3.4–5)
ALP SERPL-CCNC: 41 U/L (ref 33–136)
ALT SERPL W P-5'-P-CCNC: 8 U/L (ref 7–45)
ANION GAP SERPL CALC-SCNC: 12 MMOL/L (ref 10–20)
AST SERPL W P-5'-P-CCNC: 12 U/L (ref 9–39)
BASOPHILS # BLD AUTO: 0.03 X10*3/UL (ref 0–0.1)
BASOPHILS NFR BLD AUTO: 0.5 %
BILIRUB SERPL-MCNC: 0.2 MG/DL (ref 0–1.2)
BUN SERPL-MCNC: 18 MG/DL (ref 6–23)
CALCIUM SERPL-MCNC: 9.5 MG/DL (ref 8.6–10.3)
CHLORIDE SERPL-SCNC: 106 MMOL/L (ref 98–107)
CO2 SERPL-SCNC: 27 MMOL/L (ref 21–32)
CREAT SERPL-MCNC: 0.85 MG/DL (ref 0.5–1.05)
EOSINOPHIL # BLD AUTO: 0.06 X10*3/UL (ref 0–0.4)
EOSINOPHIL NFR BLD AUTO: 1 %
ERYTHROCYTE [DISTWIDTH] IN BLOOD BY AUTOMATED COUNT: 13 % (ref 11.5–14.5)
GFR SERPL CREATININE-BSD FRML MDRD: 65 ML/MIN/1.73M*2
GLUCOSE SERPL-MCNC: 124 MG/DL (ref 74–99)
HCT VFR BLD AUTO: 41.9 % (ref 36–46)
HGB BLD-MCNC: 13.3 G/DL (ref 12–16)
IMM GRANULOCYTES # BLD AUTO: 0.04 X10*3/UL (ref 0–0.5)
IMM GRANULOCYTES NFR BLD AUTO: 0.6 % (ref 0–0.9)
LYMPHOCYTES # BLD AUTO: 1.19 X10*3/UL (ref 0.8–3)
LYMPHOCYTES NFR BLD AUTO: 19.3 %
MCH RBC QN AUTO: 31.4 PG (ref 26–34)
MCHC RBC AUTO-ENTMCNC: 31.7 G/DL (ref 32–36)
MCV RBC AUTO: 99 FL (ref 80–100)
MONOCYTES # BLD AUTO: 1.55 X10*3/UL (ref 0.05–0.8)
MONOCYTES NFR BLD AUTO: 25.2 %
NEUTROPHILS # BLD AUTO: 3.29 X10*3/UL (ref 1.6–5.5)
NEUTROPHILS NFR BLD AUTO: 53.4 %
PLATELET # BLD AUTO: 213 X10*3/UL (ref 150–450)
POTASSIUM SERPL-SCNC: 4.3 MMOL/L (ref 3.5–5.3)
PROT SERPL-MCNC: 6.5 G/DL (ref 6.4–8.2)
RBC # BLD AUTO: 4.24 X10*6/UL (ref 4–5.2)
SODIUM SERPL-SCNC: 141 MMOL/L (ref 136–145)
WBC # BLD AUTO: 6.2 X10*3/UL (ref 4.4–11.3)

## 2023-12-27 PROCEDURE — 36415 COLL VENOUS BLD VENIPUNCTURE: CPT | Performed by: INTERNAL MEDICINE

## 2023-12-27 PROCEDURE — 99215 OFFICE O/P EST HI 40 MIN: CPT | Performed by: INTERNAL MEDICINE

## 2023-12-27 PROCEDURE — 1036F TOBACCO NON-USER: CPT | Performed by: INTERNAL MEDICINE

## 2023-12-27 PROCEDURE — 3074F SYST BP LT 130 MM HG: CPT | Performed by: INTERNAL MEDICINE

## 2023-12-27 PROCEDURE — 3078F DIAST BP <80 MM HG: CPT | Performed by: INTERNAL MEDICINE

## 2023-12-27 PROCEDURE — 1125F AMNT PAIN NOTED PAIN PRSNT: CPT | Performed by: INTERNAL MEDICINE

## 2023-12-27 PROCEDURE — 1160F RVW MEDS BY RX/DR IN RCRD: CPT | Performed by: INTERNAL MEDICINE

## 2023-12-27 PROCEDURE — 1159F MED LIST DOCD IN RCRD: CPT | Performed by: INTERNAL MEDICINE

## 2023-12-27 RX ORDER — TAMOXIFEN CITRATE 20 MG/1
20 TABLET ORAL DAILY
Qty: 90 TABLET | Refills: 1 | Status: SHIPPED | OUTPATIENT
Start: 2023-12-27 | End: 2024-03-26 | Stop reason: SDUPTHER

## 2023-12-27 ASSESSMENT — PAIN SCALES - GENERAL: PAINLEVEL: 3

## 2023-12-27 NOTE — PROGRESS NOTES
Patient ID: Ramirez Dobbins is a 89 y.o. female.  Referring Physician: No referring provider defined for this encounter.  Primary Care Provider: Aguila Mullins MD      Subjective    HPI    Chief Complaint: evaluation for breast cancer   Interval History:    Referred by Dr. Rosas      Reason for referral: breast cancer      HPI     89 year old woman with hx of A. fib on Eliquis, COPD, GERD, osteoporosis, hypertension, vit D deficiency, left bipolar hip hemiarthroplasty  who presented to the hospital in June 2022 after mechanical ground-level fall with left hip/groin pain.  CT scans of spine and C/A/P on Jun 6th, 2022 showed Acute comminuted left pelvic fracture extending to the left acetabulum,  anterior column and left iliac fossa. However it showed a somewhat speculated mass in the right breast measuring 1.2 x 2.1 x 1.8 cm. she had PT/OT and no immediate ortho interventions.   She then had work up for the breast mass in May 2023      -5/18/23 US and diagnostic MMG of the b/l breast: right breast mass at 12 o’clock position measuring 2.6 x 2.0 x 1.7 cm in size, no pathologically enlarged LN   -bx on 5/18/23 showed IDC grade 2, ER positive (>95%), IA positive (>95%) and Her2 negative (1+ IHC)   -seen with Dr. Rosas   -CT scans of C/A/P 6/7/23: no metastatic disease, mass measuring 2.6 x 1,6 cm   -CT scans in ER on 8/21/23 of C/T/L spine and C/A/P following another fall: mild compression deformity of T10 spine, right breast mass         she does not want to have surgery   she has pain in the back from her fall and   she is using tylenol and lidocaine patches   it is improved with movement   able to ambulate with her walker   she is on calcium-vit D   she tried alendronate in the past   she knew she had a breast   she can feel the breast lump which is enlarging per patient and she thinks she has some pain in the left breast   she used heating pad to improve, it is intermittent and worsened , she does not feel the  lump   not eating well since the fall   prior to that she was pretty active   no nausea or vomiting      interval history - 12/27     she has started Tamoxifen 8/30 with good tolerance   She continues to tolerate well   However had few falls recently   Her dexa scan then xrays of lumbar spine detected a compression fx   Denies neurologic symptoms   Denies abd pain, nausea or vomiting   Eating and drinking well   No fever, chills or infections   Breast mass similar   Walks with walker  No hot flashes   No LE swelling or pain     PAST MEDICAL HISTORY:   A. fib on Eliquis, COPD, GERD, osteoporosis, hypertension, vit D deficiency, left bipolar hip hemiarthroplasty who presented to the hospital  in June 2022 after mechanical ground-level fall with left hip/groin pain.  hysterectomy   cholecystostomy      SOCIAL HISTORY:   former smoker, quit 20 yrs ago   no alcohol   she owned ice cream selling business   she has 3 children      FAMILY HISTORY:    sister had breast cancer, she was not at a young age   No other specific history of bleeding, clotting or malignant disorder in the family.     REVIEW OF SYSTEMS:  Pertinent finding as per the history above.  There are no additional specific symptoms pertaining to eyes, ENT, hematologic, lymphatic, neurological, psychiatric, cardiac,  pulmonary, GI, , endocrine, rheumatic, dermatological, or musculoskeletal systems.  All other systems have been reviewed and generally negative and noncontributory.     PHYSICAL EXAMINATION:    GENERAL:  Age-appropriate, in no acute discomfort.    VITAL SIGNS:  Reviewed in the EMR  HEENT:  Normocephalic and atraumatic.  Mucous membranes are moist. No oral lesions.    NECK:  Supple without lymphadenopathy.  No thyromegaly or bruits.    CHEST:  Clear to auscultation bilaterally.    HEART:  irregular,  No gallop, rub, or murmur.    ABDOMEN:  Soft, nontender, and nondistended. No hepatosplenomegaly.    EXTREMITIES:  No cyanosis, clubbing, or  edema.  NEUROLOGICAL:  Alert, awake, and oriented.  No gross focal deficit.  LYMPHATICS: No significant lymphadenopathy.  BREAST: right breast mass in upper outer quadrant around 3.5 x 3.5 cm with dimpling of skin and retraction of nipple      LAB DATA:  Latest labs were reviewed in the EMR and from the outside sources.        Objective   BSA: 1.44 meters squared  /74 (BP Location: Left arm, Patient Position: Sitting)   Pulse 72   Temp 36.7 °C (98.1 °F) (Temporal)   Resp 18   Wt 48.5 kg (106 lb 14.8 oz)   SpO2 92%   BMI 20.20 kg/m²     Family History   Problem Relation Name Age of Onset    Diabetes Father       Oncology History    No history exists.       Ramirez Dobbins  reports that she has quit smoking. Her smoking use included cigarettes. She has been exposed to tobacco smoke. She has never used smokeless tobacco.  She  reports that she does not currently use alcohol.  She  reports no history of drug use.    Physical Exam    Performance Status:  Symptomatic; in bed <50% of the day    Assessment/Plan      1. IDC of right breast      ER positive, NM positive, Her-2 negative   [id number]   patient does not want to proceed with surgery   we discussed endocrine therapy today, discussed that this is not for curative purposes, she understands that   options for Tamoxifen and anastrozole discussed, pros and cons discussed   since she has already osteoporosis and hx of falls including recent one with compression fx , will give Tamoxifen   start Esparza 20 mg a day  she has hx of hysterectomy and she is already on Eliquis      9/27- continue Tamoxifen tolerated at this time   we will restage with US in 3 months, has not noticed any clinical changes thus far     12/27- the mass is stable in size on repeat imaging   Will continue to monitor radiographically and clinically   Continue tamoxifen for now , tolerates well      2. Osteoporosis   will obtain dexa scan   on Ca-vit D   did not tolerate Fosamax per daughter       9/27- dexa scheduled next month   12/27- dexa done Oct 2023 showed mild osteoporosis, but a compression fx, finished previously 5 yrs of fosamax, c/w Ca-vit D   Pain in back improving progressively      RTC 3 m                 Luis Umaña MD

## 2023-12-27 NOTE — PATIENT INSTRUCTIONS
Today you met with your hematologist/oncologist.  Recent labs were discussed and questions answered.  Scheduling orders were placed.  While we appreciate that you verbalized understanding, if any questions arise after leaving, please do not hesitate to call the office to discuss.  [phone] Lyly Pinto. We will see you in 3 months. Happy New Year!

## 2023-12-28 LAB — 25(OH)D3 SERPL-MCNC: 60 NG/ML (ref 30–100)

## 2024-01-12 ENCOUNTER — OFFICE VISIT (OUTPATIENT)
Dept: PRIMARY CARE | Facility: CLINIC | Age: 89
End: 2024-01-12
Payer: MEDICARE

## 2024-01-12 VITALS
HEIGHT: 61 IN | OXYGEN SATURATION: 94 % | WEIGHT: 105 LBS | SYSTOLIC BLOOD PRESSURE: 144 MMHG | TEMPERATURE: 98.4 F | HEART RATE: 70 BPM | DIASTOLIC BLOOD PRESSURE: 88 MMHG | BODY MASS INDEX: 19.83 KG/M2

## 2024-01-12 DIAGNOSIS — H90.12 CONDUCTIVE HEARING LOSS OF LEFT EAR WITH UNRESTRICTED HEARING OF RIGHT EAR: ICD-10-CM

## 2024-01-12 DIAGNOSIS — H61.22 IMPACTED CERUMEN OF LEFT EAR: Primary | ICD-10-CM

## 2024-01-12 PROCEDURE — 3077F SYST BP >= 140 MM HG: CPT | Performed by: INTERNAL MEDICINE

## 2024-01-12 PROCEDURE — 1159F MED LIST DOCD IN RCRD: CPT | Performed by: INTERNAL MEDICINE

## 2024-01-12 PROCEDURE — 1036F TOBACCO NON-USER: CPT | Performed by: INTERNAL MEDICINE

## 2024-01-12 PROCEDURE — 1125F AMNT PAIN NOTED PAIN PRSNT: CPT | Performed by: INTERNAL MEDICINE

## 2024-01-12 PROCEDURE — 99213 OFFICE O/P EST LOW 20 MIN: CPT | Performed by: INTERNAL MEDICINE

## 2024-01-12 PROCEDURE — 3079F DIAST BP 80-89 MM HG: CPT | Performed by: INTERNAL MEDICINE

## 2024-01-12 ASSESSMENT — ENCOUNTER SYMPTOMS
DIZZINESS: 0
NUMBNESS: 0
VOMITING: 0
DIARRHEA: 0
ACTIVITY CHANGE: 0
STRIDOR: 0
SINUS PAIN: 0
BACK PAIN: 0
CHEST TIGHTNESS: 0
APPETITE CHANGE: 0
SORE THROAT: 0
CONFUSION: 0
BLOOD IN STOOL: 0
MYALGIAS: 0
TROUBLE SWALLOWING: 0
TREMORS: 0
SEIZURES: 0
SHORTNESS OF BREATH: 0
CONSTIPATION: 0
HALLUCINATIONS: 0
ADENOPATHY: 0
JOINT SWELLING: 0
COUGH: 0
WHEEZING: 0
ARTHRALGIAS: 0
NECK PAIN: 0
PALPITATIONS: 0
FLANK PAIN: 0
ABDOMINAL PAIN: 0
WEAKNESS: 0
DYSURIA: 0
NAUSEA: 0
POLYDIPSIA: 0
HEADACHES: 0
UNEXPECTED WEIGHT CHANGE: 0
SLEEP DISTURBANCE: 0
VOICE CHANGE: 0
EYE PAIN: 0
PHOTOPHOBIA: 0
SPEECH DIFFICULTY: 0
FATIGUE: 0
POLYPHAGIA: 0
COLOR CHANGE: 0
NERVOUS/ANXIOUS: 0
WOUND: 0
FEVER: 0
HEMATURIA: 0
FACIAL ASYMMETRY: 0

## 2024-01-12 NOTE — PROGRESS NOTES
"Subjective   Patient ID: Ramirez Dobbins is a 89 y.o. female who presents for Hearing Problem (Left ear hearing loss ).    Hearing Problem  Pertinent negatives include no abdominal pain, arthralgias, chest pain, coughing, fatigue, fever, headaches, joint swelling, myalgias, nausea, neck pain, numbness, rash, sore throat, vomiting or weakness.        Review of Systems   Constitutional:  Negative for activity change, appetite change, fatigue, fever and unexpected weight change.   HENT:  Positive for hearing loss. Negative for dental problem, ear discharge, nosebleeds, postnasal drip, sinus pain, sore throat, trouble swallowing and voice change.    Eyes:  Negative for photophobia, pain and visual disturbance.   Respiratory:  Negative for cough, chest tightness, shortness of breath, wheezing and stridor.    Cardiovascular:  Negative for chest pain, palpitations and leg swelling.   Gastrointestinal:  Negative for abdominal pain, blood in stool, constipation, diarrhea, nausea and vomiting.   Endocrine: Negative for polydipsia, polyphagia and polyuria.   Genitourinary:  Negative for decreased urine volume, dyspareunia, dysuria, flank pain, hematuria and urgency.   Musculoskeletal:  Negative for arthralgias, back pain, gait problem, joint swelling, myalgias and neck pain.   Skin:  Negative for color change, rash and wound.   Allergic/Immunologic: Negative for environmental allergies and food allergies.   Neurological:  Negative for dizziness, tremors, seizures, syncope, facial asymmetry, speech difficulty, weakness, numbness and headaches.   Hematological:  Negative for adenopathy.   Psychiatric/Behavioral:  Negative for behavioral problems, confusion, hallucinations, self-injury, sleep disturbance and suicidal ideas. The patient is not nervous/anxious.        Objective   /88   Pulse 70   Temp 36.9 °C (98.4 °F)   Ht 1.549 m (5' 1\")   Wt 47.6 kg (105 lb)   SpO2 94%   BMI 19.84 kg/m²     Physical Exam  Vitals and " nursing note reviewed.   Constitutional:       General: She is not in acute distress.     Appearance: Normal appearance. She is normal weight. She is not ill-appearing or toxic-appearing.   HENT:      Left Ear: Ear canal and external ear normal. There is impacted cerumen.      Nose: Nose normal.   Eyes:      General:         Right eye: No discharge.         Left eye: No discharge.      Conjunctiva/sclera: Conjunctivae normal.   Pulmonary:      Effort: Pulmonary effort is normal.   Musculoskeletal:      Cervical back: Normal range of motion.   Skin:     General: Skin is warm.   Neurological:      General: No focal deficit present.      Mental Status: She is alert and oriented to person, place, and time.      Gait: Gait abnormal (ambulate with the help of walker).   Psychiatric:         Mood and Affect: Mood normal.         Behavior: Behavior normal.       Assessment/Plan   Problem List Items Addressed This Visit          ENT    Impacted cerumen of left ear - Primary     Ear irrigation was attempted but unsuccessful.          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

## 2024-01-13 PROBLEM — H61.23 BILATERAL IMPACTED CERUMEN: Status: ACTIVE | Noted: 2024-01-13

## 2024-01-13 PROBLEM — H61.22 IMPACTED CERUMEN OF LEFT EAR: Status: ACTIVE | Noted: 2024-01-13

## 2024-01-24 ENCOUNTER — EVALUATION (OUTPATIENT)
Dept: PHYSICAL THERAPY | Facility: CLINIC | Age: 89
End: 2024-01-24
Payer: MEDICARE

## 2024-01-24 DIAGNOSIS — M47.818 SI JOINT ARTHRITIS: ICD-10-CM

## 2024-01-24 DIAGNOSIS — M54.50 CHRONIC LOW BACK PAIN, UNSPECIFIED BACK PAIN LATERALITY, UNSPECIFIED WHETHER SCIATICA PRESENT: ICD-10-CM

## 2024-01-24 DIAGNOSIS — M54.9 BACK PAIN: Primary | ICD-10-CM

## 2024-01-24 DIAGNOSIS — G89.29 CHRONIC LOW BACK PAIN, UNSPECIFIED BACK PAIN LATERALITY, UNSPECIFIED WHETHER SCIATICA PRESENT: ICD-10-CM

## 2024-01-24 PROCEDURE — 97110 THERAPEUTIC EXERCISES: CPT | Mod: GP | Performed by: PHYSICAL THERAPIST

## 2024-01-24 PROCEDURE — 97161 PT EVAL LOW COMPLEX 20 MIN: CPT | Mod: GP | Performed by: PHYSICAL THERAPIST

## 2024-01-24 ASSESSMENT — ENCOUNTER SYMPTOMS
DEPRESSION: 0
OCCASIONAL FEELINGS OF UNSTEADINESS: 1
LOSS OF SENSATION IN FEET: 1

## 2024-01-24 NOTE — LETTER
January 26, 2024    [place]  [address] [address]   [facility]  [facility] [address]    Patient: Ramirez Dobbins   YOB: 1933   Date of Visit: 1/24/2024       Dear Aguila Mullins MD  [address]  [address]  [place] [address]    The attached plan of care is being sent to you because your patient’s medical reimbursement requires that you certify the plan of care. Your signature is required to allow uninterrupted insurance coverage.      You may indicate your approval by signing below and faxing this form back to us at Dept Fax: [phone].    Please call Dept: [phone] with any questions or concerns.    Thank you for this referral,        Michelle Kapoor, PT  [facility] [address] [facility]  [address] [facility] [address]    Payer: Payor: MEDICARE / Plan: MEDICARE PART A AND B / Product Type: *No Product type* /                                                                         Date:     Dear Michelle Kapoor, PT,     Re: Ms. Ramirez Dobbins, MRN:[id number]    I certify that I have reviewed the attached plan of care and it is medically necessary for Ms. Ramirez Dobbins (06/10/1933) who is under my care.          ______________________________________                    _________________  Provider name and credentials                                           Date and time                                                                                           Plan of Care 1/26/24   Effective from: 1/26/2024  Effective to: 4/23/2024    Plan ID: [id number]            Participants as of Finalize on 1/26/2024    Name Type Comments Contact Info    Aguila Mullins MD PCP - General  [phone]    Michelle Kapoor PT Physical Therapist  [phone]       Last Plan Note     Author: Michelle Kapoor PT Status: Incomplete Last edited: 1/24/2024  1:00 PM       Physical Therapy    Physical Therapy Evaluation    Patient Name: Ramirez Dobbins  MRN:  [id number]  Today's Date: 1/26/2024  Time Calculation  Start Time: 1300  Stop Time: 1345  Time Calculation (min): 45 min    Assessment: The patient is a 89 y.o. female, well-known to clinic, with chief complaint of lower back pain.  She has a history of pelvic fractures and spinal compression fracture and recent Xray shows fusion of right SI joint but no current lumbar fracture. She presents with impaired lumbar ROM, impaired LE flexibility, impaired strength in trunk and hips muscles, impaired posture from spinal malalignment, gait and balance. These physical impairments are limiting her  performance of ADLs, functional mobility, and ability to participate in recreational activities.  She will likely benefit from skilled physical therapy to address these impairments, reduce pain and optimize her safety in functional mobility and improve quality of life.        Plan  Treatment/Interventions: Cryotherapy, Education/ Instruction, Gait training, Neuromuscular re-education, Therapeutic activities, Therapeutic exercises, Hot pack  PT Plan: Skilled PT  PT Frequency: 2 times per week  Duration: 4-6 weeks  Onset Date: 12/15/23  Certification Period Start Date: 01/24/24  Certification Period End Date: 04/23/24  Rehab Potential: Good  Plan of Care Agreement: Patient    Current Problem  1. Back pain  Follow Up In Physical Therapy      2. SI joint arthritis  Referral to Physical Therapy    Follow Up In Physical Therapy    Follow Up In Physical Therapy      3. Chronic low back pain, unspecified back pain laterality, unspecified whether sciatica present            Subjective  General: Having pain in lower back from on both sides and it radiates up the spine.  Per patient had a fall and on tailbone and fractured T10.  Also reports having another fall.  (She is unable to recall the date of these falls).  Her back pain is worse when sitting and in the morning.  Feels better when she is walking. With a walker, she walks up to 1 mile  per day if weather is good. Takes her time to walk upright in the morning, Ok if lying still but worse with bed mobility.  Has a muscle relaxaer, and it made it feel better, but now worse again. Is unabl eo lift anything out of cupboard because it hurts. Has N/T in hands and feet most of time.  Left foot is numb on dorsum of foot and around the ankle.  Also has cramping in left calf and thigh and into hip.  She notes that she has found that she has an impacted left ear cerumen with hearing loss. She wonders if this has impacted her balance.  Has an appointment with ENT, but not until 3/22/24.  Goal: walk without a walker and no have much pain.      Xray from 11/11/23: Scoliosis and DJD in the lumbosacral spine as described. Arthritic, fusion of the right SI joint. Mild right hip DJD . No lumbar spine compression fracture, interval moderate loss of height at T11, age uncertain but perhaps acute, previous left hip arthroplasty       General  Reason for Referral: SI joint arthritis  Referred By: Dr. Mullins  Past Medical History Relevant to Rehab: Fall with break in pelvis, fall with fracture of T10 (per patient), Breast CA, remote left NESS Per Dr. Mullins via secure chat, no restrictions given Breast Ca Dx.     Precautions: Breast CA, monitoring radiographically, Falls, left NESS, hx of pelvic fracture, osteoporosis, Xray showing loss of height at T11, STEADi 9       Pain:Across lower back 4.5/10, up to 8/10 in a.m.      Home Living: Lives with son and his wife.  Has a bedroom on the 1st, 3 [address] with no rail.       Prior Function Per Pt/Caregiver Report: Patient has been using a RW since rehabilitation for past pelvic fractures, has not been able to transition to cane due to back pain.        Objective  Lumbar AROM:  Flex: WNL  Ext: to neutral, P!  SB R: 25%  SB L:25%  Rot R: 25%  Rot L:25%  LE/Lumbopelvic Strength:  Hip Flex: R:4 L:4-  Knee Ext: R:4+ L:4+  Ankle DF: R:5 L:5  Bilateral Bridge: 80%  Transverse  "Abdominis/Sahrmann Level: Able to contract/isolate.   Hip Abduction: R:4+ L:4  Hip Extension: R: Good bridge L: Good Bridge  Knee Flexion: R: 4+              L:4+    Flexibility:  Hamstrings: R:   Poor   L:Fair (pain in left lower back with right LE assessment SLR)  Piriformis: R: Fair    L:NT  Gastrocnemius: R: poor L: poor      Posture: knees in increased flexion, right iliac crest higher than left,(has left shoe lift), elevated shoulders kyphosis,left lumbar prominence           Palpation: +TTP thoracic lumbar paraspinals  R>L, worse at SIJ        Gait: Patient ambulates with RW with occasional asymmetric step length, forward flexed posture and mod I.      Balance: Patient demonstrates Fair+ static standing balance, Fair dynamic balance            Bed Mobility: Independent, needs increased time due to pain in back     Transfers: mod I with sit to stand with RW.            Outcome Measures: VIDHI: 56%    OP EDUCATION: Pt was educated on PT clinical findings, anatomy as it relates to symptoms and diagnosis, PT POC and initial HEP with oral and written instruction provided. She was instructed to cease exercises if pain is increasing.    -supine transversus Abdominus contraction 1 x 10, 5\"  -supine TA with hip adduction ball squeeze 1 x 10, 5\"  -supine TA hip abduction with green t-band 1 x 10, 5\"       Goals: See Goals for 1/24/2024 for this POC.         Physical Therapy - Physical Therapy - August 2023 Problems       Physical Therapy - Physical Therapy - August 2023 Problems (Resolved)       Balance       Pt will reduce TUG score by at least 3 sec to decrease the risk of falls. (Met)       Start:  10/03/23    Expected End:  11/13/23    Resolved:  10/23/23            PT Problem       Pt will improve Romberg score by 2/6 score (Met)       Start:  10/03/23    Expected End:  11/13/23    Resolved:  10/23/23         Pt will improve gait mechanics evidenced by equal step length, increased WBing LLE, and LRAD in order to " "participate in ADL, IADL, work, and leisure skills   (Met)       Start:  10/03/23    Expected End:  11/13/23    Resolved:  10/23/23         Pt will increase strength of BLE musculature by at least 1 mm grade to participate in ADL, IADL, work, and leisure skills   (Not Progressing)       Start:  10/03/23    Expected End:  11/13/23    Resolved:  11/29/23         Pt will improve \"AMPAC\" score to at least 30 for increased independence and ease with ADL/IADL's, skills, and work/leisure activities.   (Not Progressing)       Start:  10/03/23    Expected End:  11/13/23    Resolved:  11/29/23      Goal Note       NOT MET               Pt will improve stair negotiation by use of a reciprocal pattern with single use of HR to be able to negotiate different levels of home   (Met)       Start:  10/03/23    Expected End:  11/13/23    Resolved:  10/23/23         Pt will ambulate short household sitances ~50' with use of straight cane without LOB, mod I, equal WBing through LE's, and proper aurea to improve independence of mobility within home (Not Progressing)       Start:  10/23/23    Expected End:  12/18/23    Resolved:  11/29/23                  Physical Therapy 1/24/24 Problems       Physical Therapy 1/24/24 Problems (Active)       Back Pain       Patient will be independent and adherent to HEP to enhance functional progress and long term management of condition.        Start:  01/26/24    Expected End:  03/08/24            Patient will improved Modified Oswestry score by 13 points indicating meaningful clinical change in function due to reduction of back pain.       Start:  01/26/24    Expected End:  03/08/24            Patient will report reduction of back pain by 50% or greater to ease performance of functional mobility and IADLs       Start:  01/26/24    Expected End:  03/08/24            Patient will increase LE and trunk strength by 1/3 MMT grade to facilitate muscle performance necessary for maintaining symmetrical " postural alignement and functional mobility.       Start:  01/26/24    Expected End:  03/08/24            Patient will ambulate household distances with cane with mod I with no evidence of LOB or instability to improve independence in her home.        Start:  01/26/24    Expected End:  03/08/24            Patient will demonstrate Good static and dynamic standing balance.        Start:  01/26/24    Expected End:  03/08/24                                   Current Participants as of 1/26/2024    Name Type Comments Contact Info    Aguila Mullins MD PCP - General  [phone]    Signature pending    Michelle Kapoor PT Physical Therapist  [phone]    Signature pending

## 2024-01-24 NOTE — PROGRESS NOTES
Physical Therapy    Physical Therapy Evaluation    Patient Name: Ramirez Dobbins  MRN: [id number]  Today's Date: 1/26/2024  Time Calculation  Start Time: 1300  Stop Time: 1345  Time Calculation (min): 45 min    Assessment: The patient is a 89 y.o. female, well-known to clinic, with chief complaint of lower back pain.  She has a history of pelvic fractures and spinal compression fracture and recent Xray shows fusion of right SI joint but no current lumbar fracture. She presents with impaired lumbar ROM, impaired LE flexibility, impaired strength in trunk and hips muscles, impaired posture from spinal malalignment, gait and balance. These physical impairments are limiting her  performance of ADLs, functional mobility, and ability to participate in recreational activities.  She will likely benefit from skilled physical therapy to address these impairments, reduce pain and optimize her safety in functional mobility and improve quality of life.        Plan  Treatment/Interventions: Cryotherapy, Education/ Instruction, Gait training, Neuromuscular re-education, Therapeutic activities, Therapeutic exercises, Hot pack  PT Plan: Skilled PT  PT Frequency: 2 times per week  Duration: 4-6 weeks  Onset Date: 12/15/23  Certification Period Start Date: 01/24/24  Certification Period End Date: 04/23/24  Rehab Potential: Good  Plan of Care Agreement: Patient    Current Problem  1. Back pain  Follow Up In Physical Therapy      2. SI joint arthritis  Referral to Physical Therapy    Follow Up In Physical Therapy    Follow Up In Physical Therapy      3. Chronic low back pain, unspecified back pain laterality, unspecified whether sciatica present            Subjective   General: Having pain in lower back from on both sides and it radiates up the spine.  Per patient had a fall and on tailbone and fractured T10.  Also reports having another fall.  (She is unable to recall the date of these falls).  Her back pain is worse when sitting and in  the morning.  Feels better when she is walking. With a walker, she walks up to 1 mile per day if weather is good. Takes her time to walk upright in the morning, Ok if lying still but worse with bed mobility.  Has a muscle relaxaer, and it made it feel better, but now worse again. Is unabl eo lift anything out of cupboard because it hurts. Has N/T in hands and feet most of time.  Left foot is numb on dorsum of foot and around the ankle.  Also has cramping in left calf and thigh and into hip.  She notes that she has found that she has an impacted left ear cerumen with hearing loss. She wonders if this has impacted her balance.  Has an appointment with ENT, but not until 3/22/24.  Goal: walk without a walker and no have much pain.      Xray from 11/11/23: Scoliosis and DJD in the lumbosacral spine as described. Arthritic, fusion of the right SI joint. Mild right hip DJD . No lumbar spine compression fracture, interval moderate loss of height at T11, age uncertain but perhaps acute, previous left hip arthroplasty       General  Reason for Referral: SI joint arthritis  Referred By: Dr. Mullins  Past Medical History Relevant to Rehab: Fall with break in pelvis, fall with fracture of T10 (per patient), Breast CA, remote left NESS Per Dr. Mullins via secure chat, no restrictions given Breast Ca Dx.     Precautions: Breast CA, monitoring radiographically, Falls, left NESS, hx of pelvic fracture, osteoporosis, Xray showing loss of height at T11, STEADi 9       Pain:Across lower back 4.5/10, up to 8/10 in a.m.      Home Living: Lives with son and his wife.  Has a bedroom on the 1st, 3 [address] with no rail.       Prior Function Per Pt/Caregiver Report: Patient has been using a RW since rehabilitation for past pelvic fractures, has not been able to transition to cane due to back pain.        Objective   Lumbar AROM:  Flex: WNL  Ext: to neutral, P!  SB R: 25%  SB L:25%  Rot R: 25%  Rot L:25%  LE/Lumbopelvic Strength:  Hip Flex:  "R:4 L:4-  Knee Ext: R:4+ L:4+  Ankle DF: R:5 L:5  Bilateral Bridge: 80%  Transverse Abdominis/Sahrmann Level: Able to contract/isolate.   Hip Abduction: R:4+ L:4  Hip Extension: R: Good bridge L: Good Bridge  Knee Flexion: R: 4+              L:4+    Flexibility:  Hamstrings: R:   Poor   L:Fair (pain in left lower back with right LE assessment SLR)  Piriformis: R: Fair    L:NT  Gastrocnemius: R: poor L: poor      Posture: knees in increased flexion, right iliac crest higher than left,(has left shoe lift), elevated shoulders kyphosis,left lumbar prominence           Palpation: +TTP thoracic lumbar paraspinals  R>L, worse at SIJ        Gait: Patient ambulates with RW with occasional asymmetric step length, forward flexed posture and mod I.      Balance: Patient demonstrates Fair+ static standing balance, Fair dynamic balance            Bed Mobility: Independent, needs increased time due to pain in back     Transfers: mod I with sit to stand with RW.            Outcome Measures: VIDHI: 56%    OP EDUCATION: Pt was educated on PT clinical findings, anatomy as it relates to symptoms and diagnosis, PT POC and initial HEP with oral and written instruction provided. She was instructed to cease exercises if pain is increasing.    -supine transversus Abdominus contraction 1 x 10, 5\"  -supine TA with hip adduction ball squeeze 1 x 10, 5\"  -supine TA hip abduction with green t-band 1 x 10, 5\"       Goals: See Goals for 1/24/2024 for this POC.         Physical Therapy - Physical Therapy - August 2023 Problems       Physical Therapy - Physical Therapy - August 2023 Problems (Resolved)       Balance       Pt will reduce TUG score by at least 3 sec to decrease the risk of falls. (Met)       Start:  10/03/23    Expected End:  11/13/23    Resolved:  10/23/23            PT Problem       Pt will improve Romberg score by 2/6 score (Met)       Start:  10/03/23    Expected End:  11/13/23    Resolved:  10/23/23         Pt will improve gait " "mechanics evidenced by equal step length, increased WBing LLE, and LRAD in order to participate in ADL, IADL, work, and leisure skills   (Met)       Start:  10/03/23    Expected End:  11/13/23    Resolved:  10/23/23         Pt will increase strength of BLE musculature by at least 1 mm grade to participate in ADL, IADL, work, and leisure skills   (Not Progressing)       Start:  10/03/23    Expected End:  11/13/23    Resolved:  11/29/23         Pt will improve \"AMPAC\" score to at least 30 for increased independence and ease with ADL/IADL's, skills, and work/leisure activities.   (Not Progressing)       Start:  10/03/23    Expected End:  11/13/23    Resolved:  11/29/23      Goal Note       NOT MET               Pt will improve stair negotiation by use of a reciprocal pattern with single use of HR to be able to negotiate different levels of home   (Met)       Start:  10/03/23    Expected End:  11/13/23    Resolved:  10/23/23         Pt will ambulate short household sitances ~50' with use of straight cane without LOB, mod I, equal WBing through LE's, and proper aurea to improve independence of mobility within home (Not Progressing)       Start:  10/23/23    Expected End:  12/18/23    Resolved:  11/29/23                  Physical Therapy 1/24/24 Problems       Physical Therapy 1/24/24 Problems (Active)       Back Pain       Patient will be independent and adherent to HEP to enhance functional progress and long term management of condition.        Start:  01/26/24    Expected End:  03/08/24            Patient will improved Modified Oswestry score by 13 points indicating meaningful clinical change in function due to reduction of back pain.       Start:  01/26/24    Expected End:  03/08/24            Patient will report reduction of back pain by 50% or greater to ease performance of functional mobility and IADLs       Start:  01/26/24    Expected End:  03/08/24            Patient will increase LE and trunk strength by 1/3 " MMT grade to facilitate muscle performance necessary for maintaining symmetrical postural alignement and functional mobility.       Start:  01/26/24    Expected End:  03/08/24            Patient will ambulate household distances with cane with mod I with no evidence of LOB or instability to improve independence in her home.        Start:  01/26/24    Expected End:  03/08/24            Patient will demonstrate Good static and dynamic standing balance.        Start:  01/26/24    Expected End:  03/08/24

## 2024-01-26 PROBLEM — M54.9 BACK PAIN: Status: ACTIVE | Noted: 2024-01-26

## 2024-01-27 PROBLEM — R06.02 SOB (SHORTNESS OF BREATH) ON EXERTION: Status: RESOLVED | Noted: 2023-10-05 | Resolved: 2024-01-27

## 2024-01-27 PROBLEM — N63.0 BREAST MASS: Status: RESOLVED | Noted: 2023-10-05 | Resolved: 2024-01-27

## 2024-01-27 PROBLEM — R53.83 FATIGUE: Status: RESOLVED | Noted: 2023-05-26 | Resolved: 2024-01-27

## 2024-01-27 PROBLEM — S32.402A: Status: RESOLVED | Noted: 2023-10-05 | Resolved: 2024-01-27

## 2024-01-27 PROBLEM — R26.89 POOR BALANCE: Status: RESOLVED | Noted: 2023-05-26 | Resolved: 2024-01-27

## 2024-01-27 PROBLEM — Z85.3 HISTORY OF BREAST CANCER: Status: ACTIVE | Noted: 2024-01-27

## 2024-01-27 PROBLEM — M62.81 MUSCLE WEAKNESS: Status: RESOLVED | Noted: 2023-10-05 | Resolved: 2024-01-27

## 2024-01-27 PROBLEM — R53.1 WEAKNESS: Status: RESOLVED | Noted: 2023-10-03 | Resolved: 2024-01-27

## 2024-01-27 PROBLEM — R26.9 ABNORMAL GAIT: Status: RESOLVED | Noted: 2023-05-26 | Resolved: 2024-01-27

## 2024-01-27 PROBLEM — Z79.899 DRUG THERAPY: Status: RESOLVED | Noted: 2023-10-05 | Resolved: 2024-01-27

## 2024-01-27 PROBLEM — H61.22 IMPACTED CERUMEN OF LEFT EAR: Status: RESOLVED | Noted: 2024-01-13 | Resolved: 2024-01-27

## 2024-01-27 PROBLEM — Z00.00 MEDICARE ANNUAL WELLNESS VISIT, SUBSEQUENT: Status: RESOLVED | Noted: 2023-04-30 | Resolved: 2024-01-27

## 2024-01-27 PROBLEM — R25.2 BILATERAL LEG CRAMPS: Status: RESOLVED | Noted: 2023-10-05 | Resolved: 2024-01-27

## 2024-01-27 PROBLEM — H61.22 HEARING LOSS OF LEFT EAR DUE TO CERUMEN IMPACTION: Status: RESOLVED | Noted: 2023-05-26 | Resolved: 2024-01-27

## 2024-01-27 PROBLEM — C50.911 BREAST CANCER, RIGHT (MULTI): Status: RESOLVED | Noted: 2023-04-28 | Resolved: 2024-01-27
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
## 2024-01-27 PROBLEM — M54.9 BACK PAIN: Status: RESOLVED | Noted: 2024-01-26 | Resolved: 2024-01-27

## 2024-01-29 ENCOUNTER — OFFICE VISIT (OUTPATIENT)
Dept: PRIMARY CARE | Facility: CLINIC | Age: 89
End: 2024-01-29
Payer: MEDICARE

## 2024-01-29 VITALS
HEART RATE: 72 BPM | OXYGEN SATURATION: 94 % | SYSTOLIC BLOOD PRESSURE: 152 MMHG | DIASTOLIC BLOOD PRESSURE: 66 MMHG | BODY MASS INDEX: 20.2 KG/M2 | HEIGHT: 61 IN | WEIGHT: 107 LBS

## 2024-01-29 DIAGNOSIS — H66.92 ACUTE LEFT OTITIS MEDIA: Primary | ICD-10-CM

## 2024-01-29 DIAGNOSIS — C50.919 MALIGNANT NEOPLASM OF FEMALE BREAST, UNSPECIFIED ESTROGEN RECEPTOR STATUS, UNSPECIFIED LATERALITY, UNSPECIFIED SITE OF BREAST (MULTI): ICD-10-CM

## 2024-01-29 DIAGNOSIS — J44.9 CHRONIC OBSTRUCTIVE PULMONARY DISEASE, UNSPECIFIED COPD TYPE (MULTI): ICD-10-CM

## 2024-01-29 DIAGNOSIS — I48.0 PAROXYSMAL ATRIAL FIBRILLATION (MULTI): ICD-10-CM

## 2024-01-29 DIAGNOSIS — M81.0 AGE RELATED OSTEOPOROSIS, UNSPECIFIED PATHOLOGICAL FRACTURE PRESENCE: ICD-10-CM

## 2024-01-29 DIAGNOSIS — S22.080D CLOSED WEDGE COMPRESSION FRACTURE OF T11 VERTEBRA WITH ROUTINE HEALING, SUBSEQUENT ENCOUNTER: ICD-10-CM

## 2024-01-29 PROCEDURE — 1125F AMNT PAIN NOTED PAIN PRSNT: CPT | Performed by: FAMILY MEDICINE

## 2024-01-29 PROCEDURE — 99204 OFFICE O/P NEW MOD 45 MIN: CPT | Performed by: FAMILY MEDICINE

## 2024-01-29 PROCEDURE — 1036F TOBACCO NON-USER: CPT | Performed by: FAMILY MEDICINE

## 2024-01-29 PROCEDURE — 3078F DIAST BP <80 MM HG: CPT | Performed by: FAMILY MEDICINE

## 2024-01-29 PROCEDURE — 99214 OFFICE O/P EST MOD 30 MIN: CPT | Performed by: FAMILY MEDICINE

## 2024-01-29 PROCEDURE — 1160F RVW MEDS BY RX/DR IN RCRD: CPT | Performed by: FAMILY MEDICINE

## 2024-01-29 PROCEDURE — 1157F ADVNC CARE PLAN IN RCRD: CPT | Performed by: FAMILY MEDICINE

## 2024-01-29 PROCEDURE — 3077F SYST BP >= 140 MM HG: CPT | Performed by: FAMILY MEDICINE

## 2024-01-29 PROCEDURE — 1159F MED LIST DOCD IN RCRD: CPT | Performed by: FAMILY MEDICINE

## 2024-01-29 RX ORDER — LIDOCAINE 50 MG/G
1 PATCH TOPICAL DAILY
COMMUNITY
End: 2024-05-09 | Stop reason: ALTCHOICE

## 2024-01-29 RX ORDER — CEFDINIR 300 MG/1
300 CAPSULE ORAL 2 TIMES DAILY
Qty: 14 CAPSULE | Refills: 0 | Status: SHIPPED | OUTPATIENT
Start: 2024-01-29 | End: 2024-02-05

## 2024-01-29 RX ORDER — CALCITONIN SALMON 200 [IU]/.09ML
1 SPRAY, METERED NASAL DAILY
Qty: 3.7 ML | Refills: 11 | Status: SHIPPED | OUTPATIENT
Start: 2024-01-29 | End: 2025-01-28

## 2024-01-29 RX ORDER — MELATONIN 1 MG
TABLET,CHEWABLE ORAL
COMMUNITY

## 2024-01-29 ASSESSMENT — ENCOUNTER SYMPTOMS
OCCASIONAL FEELINGS OF UNSTEADINESS: 0
LOSS OF SENSATION IN FEET: 1
DEPRESSION: 0

## 2024-01-29 ASSESSMENT — PATIENT HEALTH QUESTIONNAIRE - PHQ9
2. FEELING DOWN, DEPRESSED OR HOPELESS: NOT AT ALL
1. LITTLE INTEREST OR PLEASURE IN DOING THINGS: NOT AT ALL
SUM OF ALL RESPONSES TO PHQ9 QUESTIONS 1 AND 2: 0

## 2024-01-29 ASSESSMENT — PAIN SCALES - GENERAL: PAINLEVEL: 4

## 2024-01-29 NOTE — PATIENT INSTRUCTIONS
For ear issues - appears to have infection.  Recommend omnicef 300mg twice a day for 7 days. Follow up in ENT    For compression fracture, osteoporosis - recommend trial of Miacalcin spray - 1 spray each day.     For afib - stable on metoprolol and eliquis.      For nasal congestion - called vasomotor rhinitis - may add loratadine or atrovent if not improving    For sacroiliac pain - recommend seeing PM&R - could benefit from injection.     For COPD - stable on trelegy.  Has albuterol if needs rescue    Follow up in 3 months.

## 2024-01-29 NOTE — PROGRESS NOTES
"Subjective   Patient ID: Ramirez Dobbins is a 89 y.o. female who presents for [facility].    Here to establish.  Living with son and daughter in law.  Living in - in law suite and walker.  Very active - fall 1 1/2 year ago - had pelvic fracture.  Went to rehab.  Has not fully recovered since then.  PT x 3.  Balance is improved.  Independent in ADLs.  Some short term memories.      Rhinorrhea  -Patient has constant rhinorrhea.  Constant phlegm.  Pt is on trelegy every day.  Improves on mucinex.     Atrial Fibrillation  -F/U:  Stable.  No recent issues. On metoprolol  -Anticoagulation: Eliquis - no bleeding   -Cardiologist:  Dr. Page.      Left ear  -Patient has decreased hearing.  Referred to ENT - no visits until march.  Tried to irrigate - no successful.  No peroxide.  Not sure how long.  Patient has been using ear buds - unable to hear.     Low back pain  -hx of compression fractures.  Fall last year.  Scoliosis.  Using lidocaine patches.  Robaxin at night.  Patient trying to avoid narcotic therapy.  Located in the low back.  Appt with PM&R.  Was on fosamax for 5 years.  No medication after.  Hx of multiple fractures.     Cancer History:  -Type: Breast Cancer  -Stage:  -Oncologist: Dr. Umaña  -F/U: Recently diagnosed last June - tolerating tamoxifen.  No hot flashes.               Review of Systems    Objective   /66 (BP Location: Left arm, Patient Position: Sitting, BP Cuff Size: Small adult)   Pulse 72   Ht 1.549 m (5' 1\")   Wt 48.5 kg (107 lb)   SpO2 94%   BMI 20.22 kg/m²     Physical Exam  Vitals reviewed.   Constitutional:       General: She is not in acute distress.  HENT:      Right Ear: Tympanic membrane and ear canal normal.      Left Ear: Ear canal normal. A middle ear effusion is present. Tympanic membrane is erythematous.      Nose: Rhinorrhea present.      Right Turbinates: Swollen.      Left Turbinates: Swollen.   Cardiovascular:      Rate and Rhythm: Normal rate and regular rhythm. "   Pulmonary:      Effort: Pulmonary effort is normal.      Breath sounds: No wheezing or rhonchi.   Musculoskeletal:      Right lower leg: No edema.      Left lower leg: No edema.      Comments: Using rollator; Tender lower thoracic midline and bilateral SI joints   Lymphadenopathy:      Cervical: No cervical adenopathy.   Neurological:      Mental Status: She is alert.   Psychiatric:         Mood and Affect: Mood normal.         Behavior: Behavior normal.         Assessment/Plan   Diagnoses and all orders for this visit:  Acute left otitis media  -     cefdinir (Omnicef) 300 mg capsule; Take 1 capsule (300 mg) by mouth 2 times a day for 7 days.  Closed wedge compression fracture of T11 vertebra with routine healing, subsequent encounter  -     calcitonin, salmon, (Miacalcin) 200 unit/actuation nasal spray; Administer 1 spray into one nostril once daily.  Paroxysmal atrial fibrillation (CMS/HCC)  Malignant neoplasm of female breast, unspecified estrogen receptor status, unspecified laterality, unspecified site of breast (CMS/HCC)  Chronic obstructive pulmonary disease, unspecified COPD type (CMS/HCC)  Age related osteoporosis, unspecified pathological fracture presence    Patient Instructions   For ear issues - appears to have infection.  Recommend omnicef 300mg twice a day for 7 days. Follow up in ENT    For compression fracture, osteoporosis - recommend trial of Miacalcin spray - 1 spray each day.     For afib - stable on metoprolol and eliquis.      For nasal congestion - called vasomotor rhinitis - may add loratadine or atrovent if not improving    For sacroiliac pain - recommend seeing PM&R - could benefit from injection.     For COPD - stable on trelegy.  Has albuterol if needs rescue    Follow up in 3 months.

## 2024-01-30 PROBLEM — S22.080D CLOSED WEDGE COMPRESSION FRACTURE OF T11 VERTEBRA WITH ROUTINE HEALING: Status: ACTIVE | Noted: 2024-01-30

## 2024-01-30 PROBLEM — G83.10: Status: RESOLVED | Noted: 2023-05-26 | Resolved: 2024-01-30

## 2024-01-30 PROBLEM — G83.11: Status: RESOLVED | Noted: 2023-10-05 | Resolved: 2024-01-30

## 2024-02-07 ENCOUNTER — TREATMENT (OUTPATIENT)
Dept: PHYSICAL THERAPY | Facility: CLINIC | Age: 89
End: 2024-02-07
Payer: MEDICARE

## 2024-02-07 DIAGNOSIS — M47.818 SI JOINT ARTHRITIS: ICD-10-CM

## 2024-02-07 PROCEDURE — 97110 THERAPEUTIC EXERCISES: CPT | Mod: GP

## 2024-02-07 NOTE — PROGRESS NOTES
Physical Therapy    Physical Therapy Treatment    Patient Name: Ramirez Dobbins  MRN: [id number]  Today's Date: 2/7/2024  Time Calculation  Start Time: 1530  Stop Time: 1613  Time Calculation (min): 43 min      Assessment:   In-clinic program initiated this date. Patient appeared to tolerate session well as noted by no reports of increased pain following session this date. Patient required min verbal cueing for correct technique. Patient was educated on safe completion of HEP given at last visit, no new exercises added. Patient educated to compete HEP on bed, but notes she prefers to complete it on floor and has no difficulty getting on/off floor. Assess patient's response to this session next visit and progress program as able and tolerated by patient.     Plan:   Progress POC as able and tolerated by patient. Add in standing exercises as able and tolerated by patient.     Current Problem  1. SI joint arthritis  Follow Up In Physical Therapy          General     General  Reason for Referral: SI joint arthritis  Referred By: Dr. Mullins  Past Medical History Relevant to Rehab: Fall with break in pelvis, fall with fracture of T10 (per patient), Breast CA, remote left NESS    Subjective    The patient notes no new changes or new complaints since evaluation. Notes she is continuing to have back pain. Patient notes 2-3/10 pain across low back, used a lidocaine patch prior to coming to therapy. Notes she has been doing HEP and its going well. Notes  she has been getting on floor next to her bed to do it, notes she feels safe getting on/off floor and feels safer doing it on the floor compared to her bed because her bed is high. Patient notes she has the hardest time with pain in the mornings, has a difficult time walking upright. No recent falls.     Precautions  Fall risk, osteoporosis   Pain   2-3/10 pain across low back at start of session. Patient noted no change in pain at end of session and left therapy in no distress.  "    Objective   The patient presents to therapy ambulating with FWW.     Treatments:  Therapeutic Exercise: (50392)- 43 minutes   -Sci fit, seat 11, L1 x 8 minutes BUEs and BLEs     Hooklying:   -Hooklying TA brace x 10 reps, 5 sec hold  -Hooklying B hip adduction ball squeeze 2 x 10 reps, 5 sec hold  -Bridge x 15 reps, 5 sec hold   -B hip abduction against green tband above knees 2 x 10 reps  -Single knee fall out against green tband x 10 reps each R/L, 5 sec hold   - B SAQ with large bolster 2 x 10 reps, 5 sec hold     OP EDUCATION:  Correct exercise technique. Patient educated to complete exercises on bed/couch at home versus floor, however, pt notes she prefers to complete on floor and noted no difficulty getting down or up from floor.     Goals:  Physical Therapy - Physical Therapy - August 2023 Problems       Physical Therapy - Physical Therapy - August 2023 Problems (Resolved)       Balance       Pt will reduce TUG score by at least 3 sec to decrease the risk of falls. (Met)       Start:  10/03/23    Expected End:  11/13/23    Resolved:  10/23/23            PT Problem       Pt will improve Romberg score by 2/6 score (Met)       Start:  10/03/23    Expected End:  11/13/23    Resolved:  10/23/23         Pt will improve gait mechanics evidenced by equal step length, increased WBing LLE, and LRAD in order to participate in ADL, IADL, work, and leisure skills   (Met)       Start:  10/03/23    Expected End:  11/13/23    Resolved:  10/23/23         Pt will increase strength of BLE musculature by at least 1 mm grade to participate in ADL, IADL, work, and leisure skills   (Not Progressing)       Start:  10/03/23    Expected End:  11/13/23    Resolved:  11/29/23         Pt will improve \"AMPAC\" score to at least 30 for increased independence and ease with ADL/IADL's, skills, and work/leisure activities.   (Not Progressing)       Start:  10/03/23    Expected End:  11/13/23    Resolved:  11/29/23      Goal Note       NOT " MET               Pt will improve stair negotiation by use of a reciprocal pattern with single use of HR to be able to negotiate different levels of home   (Met)       Start:  10/03/23    Expected End:  11/13/23    Resolved:  10/23/23         Pt will ambulate short household sitances ~50' with use of straight cane without LOB, mod I, equal WBing through LE's, and proper aurea to improve independence of mobility within home (Not Progressing)       Start:  10/23/23    Expected End:  12/18/23    Resolved:  11/29/23                  Physical Therapy 1/24/24 Problems       Physical Therapy 1/24/24 Problems (Active)       Back Pain       Patient will be independent and adherent to HEP to enhance functional progress and long term management of condition.        Start:  01/26/24    Expected End:  03/08/24            Patient will improved Modified Oswestry score by 13 points indicating meaningful clinical change in function due to reduction of back pain.       Start:  01/26/24    Expected End:  03/08/24            Patient will report reduction of back pain by 50% or greater to ease performance of functional mobility and IADLs       Start:  01/26/24    Expected End:  03/08/24            Patient will increase LE and trunk strength by 1/3 MMT grade to facilitate muscle performance necessary for maintaining symmetrical postural alignement and functional mobility.       Start:  01/26/24    Expected End:  03/08/24            Patient will ambulate household distances with cane with mod I with no evidence of LOB or instability to improve independence in her home.        Start:  01/26/24    Expected End:  03/08/24            Patient will demonstrate Good static and dynamic standing balance.        Start:  01/26/24    Expected End:  03/08/24

## 2024-02-08 ENCOUNTER — OFFICE VISIT (OUTPATIENT)
Dept: PULMONOLOGY | Facility: CLINIC | Age: 89
End: 2024-02-08
Payer: MEDICARE

## 2024-02-08 VITALS
DIASTOLIC BLOOD PRESSURE: 64 MMHG | RESPIRATION RATE: 16 BRPM | OXYGEN SATURATION: 92 % | HEART RATE: 66 BPM | WEIGHT: 108 LBS | BODY MASS INDEX: 20.41 KG/M2 | SYSTOLIC BLOOD PRESSURE: 165 MMHG

## 2024-02-08 DIAGNOSIS — J44.9 CHRONIC OBSTRUCTIVE PULMONARY DISEASE, UNSPECIFIED COPD TYPE (MULTI): Primary | ICD-10-CM

## 2024-02-08 PROCEDURE — 3078F DIAST BP <80 MM HG: CPT | Performed by: INTERNAL MEDICINE

## 2024-02-08 PROCEDURE — 99213 OFFICE O/P EST LOW 20 MIN: CPT | Performed by: INTERNAL MEDICINE

## 2024-02-08 PROCEDURE — 1126F AMNT PAIN NOTED NONE PRSNT: CPT | Performed by: INTERNAL MEDICINE

## 2024-02-08 PROCEDURE — 3077F SYST BP >= 140 MM HG: CPT | Performed by: INTERNAL MEDICINE

## 2024-02-08 PROCEDURE — 1159F MED LIST DOCD IN RCRD: CPT | Performed by: INTERNAL MEDICINE

## 2024-02-08 PROCEDURE — 1036F TOBACCO NON-USER: CPT | Performed by: INTERNAL MEDICINE

## 2024-02-08 PROCEDURE — 1157F ADVNC CARE PLAN IN RCRD: CPT | Performed by: INTERNAL MEDICINE

## 2024-02-08 PROCEDURE — 1160F RVW MEDS BY RX/DR IN RCRD: CPT | Performed by: INTERNAL MEDICINE

## 2024-02-08 ASSESSMENT — PAIN SCALES - GENERAL: PAINLEVEL: 0-NO PAIN

## 2024-02-08 ASSESSMENT — COLUMBIA-SUICIDE SEVERITY RATING SCALE - C-SSRS
6. HAVE YOU EVER DONE ANYTHING, STARTED TO DO ANYTHING, OR PREPARED TO DO ANYTHING TO END YOUR LIFE?: NO
2. HAVE YOU ACTUALLY HAD ANY THOUGHTS OF KILLING YOURSELF?: NO

## 2024-02-08 ASSESSMENT — ENCOUNTER SYMPTOMS: DEPRESSION: 0

## 2024-02-08 NOTE — PROGRESS NOTES
Department of Medicine  Division of Pulmonary, Critical Care, and Sleep Medicine  Follow-Up Visit  [facility] - [address], [address]    Physician HPI (2/8/2024):    Very pleasant 89-year-old female presenting for follow-up on COPD.  COPD seems to be reasonably controlled, no exacerbations since last visit, compliant with inhalers and respiratory symptoms mainly dyspnea on exertion stable.  Treated recently for ear infection with course of antibiotics.  Complaining of some phlegm in her throat likely postnasal drip.       Immunization History   Administered Date(s) Administered    Flu vaccine, quadrivalent, high-dose, preservative free, age 65y+ (FLUZONE) 11/09/2022    Influenza, High Dose Seasonal, Preservative Free 10/29/2015, 10/13/2016    Influenza, Seasonal, Quadrivalent, Adjuvanted 11/29/2023    Influenza, Unspecified 10/07/2010, 10/19/2011, 10/17/2012, 10/02/2013, 11/08/2017, 09/20/2018, 09/05/2019, 09/25/2020, 10/20/2021, 11/09/2022    Influenza, seasonal, injectable 10/07/2010, 09/05/2019    Pfizer Purple Cap SARS-CoV-2 01/21/2021, 02/18/2021, 12/15/2021    Pneumococcal conjugate vaccine, 13-valent (PREVNAR 13) 11/12/2018    Pneumococcal polysaccharide vaccine, 23-valent, age 2 years and older (PNEUMOVAX 23) 04/18/2012       Current Medications:  Current Outpatient Medications   Medication Instructions    amLODIPine (NORVASC) 5 mg, oral, Daily    apixaban (ELIQUIS) 2.5 mg, oral, Every 12 hours    calcitonin, salmon, (Miacalcin) 200 unit/actuation nasal spray 1 spray, One Nostril, Daily    cholecalciferol (Vitamin D-3) 50 MCG (2000 UT) tablet 1 tablet, oral, Daily    famotidine (PEPCID) 20 mg, oral, 2 times daily    levalbuterol (Xopenex) 45 mcg/actuation inhaler 1-2 puffs, inhalation, Every 4 hours PRN    lidocaine (Lidoderm) 5 % patch 1 patch, transdermal, Daily, Remove & discard patch within 12 hours or as directed by MD.    melatonin 1 mg tablet,chewable oral    methocarbamol (ROBAXIN)  "250 mg, oral, 3 times daily PRN    metoprolol succinate XL (TOPROL-XL) 200 mg, oral, Daily    multivit-min/vit C/herb no.124 (AIR[place]ORNE, ASCORBIC ACID, ORAL) oral    tamoxifen (NOLVADEX) 20 mg, oral, Daily    traMADol (ULTRAM) 50 mg, oral, Every 6 hours PRN    Trelegy Ellipta 200-62.5-25 mcg blister with device INHALE 1 PUFF ,ONCE DAILY        Drug Allergies/Intolerances:  Allergies   Allergen Reactions    Codeine Hallucinations    Nitrofurantoin Dizziness and Hallucinations    Penicillins Hives    Albuterol Unknown    Sulfamethoxazole-Trimethoprim Unknown          Visit Vitals  /64   Pulse 66   Resp 16   Wt 49 kg (108 lb)   SpO2 92%   BMI 20.41 kg/m²   OB Status Postmenopausal   Smoking Status Former   BSA 1.45 m²        Physical exam  Constitutional: Normal appearance.  HEENT: Normocephalic and atraumatic.  Cardiovascular: Normal rate and regular rhythm.  Pulmonary: Normal respiratory effort, bilateral clear breath sounds, no wheezing or rhonchi.  Musculoskeletal: No edema, no cyanosis.  Neurological: Awake, alert and oriented x3.  Psychiatric: Normal behavior, mood and affect.      Pulmonary Function Test Results     Pulmonary Functions Testing Results:    No results found for: \"FEV1\", \"FVC\", \"PZA1APA\", \"TLC\", \"DLCO\"      Chest Radiograph     XR CHEST 1 VIEW 06/08/2022    Narrative  MRN: [id number]  Patient Name: NINO MAYORGA    STUDY:  CHEST 1 VIEW;  6/8/2022 3:02 pm    INDICATION:  increased oxygen requirements .    COMPARISON:  Chest radiograph dated 6/7/2022    ACCESSION NUMBER(S):  [id number]    ORDERING CLINICIAN:  JENNIFER CRYSTAL    FINDINGS:  AP radiograph of the chest      CARDIOMEDIASTINAL SILHOUETTE:  The cardiomediastinal silhouette is stable in size and configuration.  Aortic knob calcifications are seen.    LUNGS:  There is similar coarsening of lung markings bilaterally, correlating  with COPD changes. There is unchanged mild bibasilar subsegmental  atelectasis. Stable to slightly improved bibasilar " "bandlike  opacities, which likely represent atelectasis. No new consolidation  or sizable pleural effusion. There is no pneumothorax.    ABDOMEN:  No remarkable upper abdominal findings.    BONES:  No acute osseous abnormality.    Impression  1. Stable to slightly improved bandlike bibasilar airspace opacities,  which likely represent  atelectasis. No new consolidation or sizable  pleural effusion.      Echocardiogram     No results found for this or any previous visit from the past 365 days.       Chest CT Scan     No results found for this or any previous visit from the past 365 days.       Laboratory Studies     Lab Results   Component Value Date    WBC 6.2 12/27/2023    HGB 13.3 12/27/2023    HCT 41.9 12/27/2023    MCV 99 12/27/2023     12/27/2023      Lab Results   Component Value Date    GLUCOSE 124 (H) 12/27/2023    CALCIUM 9.5 12/27/2023     12/27/2023    K 4.3 12/27/2023    CO2 27 12/27/2023     12/27/2023    BUN 18 12/27/2023    CREATININE 0.85 12/27/2023      Lab Results   Component Value Date    ALT 8 12/27/2023    AST 12 12/27/2023    ALKPHOS 41 12/27/2023    BILITOT 0.2 12/27/2023        Sputum Culture     No results found for: \"AFBCX\"   No results found for: \"RESPCULTCYFI\"  No results found for the last 90 days.          Assessment and Plan / Recommendations     Very pleasant 89-year-old female presenting for follow-up on COPD.     1.GOLD I COPD, seems to be reasonably controlled on current inhalers.  Treated recently for ear infection with course of antibiotics.  Complaining of some phlegm in her throat likely postnasal drip.   -continue Trelegy daily, albuterol as needed.   -continue PT, rehab  -Flonase for allergic rhinitis  -Return to clinic in 1 year or sooner with any concerns    This dictation was created using voice recognition software. Phonetic and/or minor grammatical errors may exist.           Ashwini Cherry MD   02/08/2024      "

## 2024-02-13 ENCOUNTER — TREATMENT (OUTPATIENT)
Dept: PHYSICAL THERAPY | Facility: CLINIC | Age: 89
End: 2024-02-13
Payer: MEDICARE

## 2024-02-13 DIAGNOSIS — M47.818 SI JOINT ARTHRITIS: Primary | ICD-10-CM

## 2024-02-13 PROCEDURE — 97110 THERAPEUTIC EXERCISES: CPT | Mod: GP

## 2024-02-13 NOTE — PROGRESS NOTES
Physical Therapy    Physical Therapy Treatment    Patient Name: Ramirez Dobbins  MRN: [id number]  Today's Date: 2/13/2024  Time Calculation  Start Time: 1730  Stop Time: 1812  Time Calculation (min): 42 min    Visit 3   MEDICARE A&B, MMO SUPP, MED NEC, NO AUTH       Assessment:  Patient tolerated treatment well without increased complaints of pain during or after session. Patient reported she liked the upper back strengthening exercises with the theraband and denied pain with them.    Assess patient's response to this session next visit and provide written instructions if no adverse reactions experienced.   Patient will continue to benefit from skilled PT services to address deficits/impairments/patient goals  contributing to limited functional abilities, independence and pain.     Plan:  Progress POC as able and tolerated by patient. Add in standing exercises as able and tolerated by patient.         Current Problem  1. SI joint arthritis  Follow Up In Physical Therapy       General  General  Reason for Referral: SI joint arthritis  Referred By: Dr. Mullins  Past Medical History Relevant to Rehab: Fall with break in pelvis, fall with fracture of T10 (per patient), Breast CA, remote left NESS     Subjective    Patient reports exercising helps her.  Presently reports 5/10 pain in left LB/LS region.  Yesterday she walked 5600 steps with FWW.  Today only 2600 step.   States she has a hard time standing up straight in the morning because of pain and stiffness. Also, reports her legs feel weak and like they will give out on her.     Precautions  Fall risk, osteoporosis      Pain  5/10 in left LB/LS region at the start of session.  States the pain can move from side to side.        Objective   The patient presents to therapy ambulating with FWW.      Treatments:  Therapeutic Exercise: (67313)- 42 minutes   -Sci fit, seat 11, L1 x 8 minutes BUEs and BLEs      Hooklying:   -TA brace x 10 reps, 5 sec hold  -B hip adduction ball  "squeeze 2 x 10 reps, 5 sec hold  -B hip abduction against green tband above knees 2 x 10 reps  -Single knee fall out against green tband x 10 reps each R/L, 5 sec hold  - deferred 2/13/24  -Bekah Yessi R/L leg press into the mat 5\" hold x 10 reps  -Bekah Yessi R/L leg lengthener 5\" hold x 10 reps   -B shoulder horizontal ABD with  green theraband, palms up/palms down 2 x 10 reps  -R/L UE PNF D2 flexion with green theraband 2 x 10 reps   -R/L arm lengthener 5\" hold x 10 reps  - B SAQ with large bolster 2 x 10 reps, 5 sec hold  - deferred 2/13/24     OP EDUCATION:  Continue with current HEP as previously instructed.   Instructed in correct exercise technique. Patient encouraged to continue to complete exercises on bed/couch at home versus floor as previously noted.       Goals:  Physical Therapy - Physical Therapy - August 2023 Problems       Physical Therapy - Physical Therapy - August 2023 Problems (Resolved)       Balance       Pt will reduce TUG score by at least 3 sec to decrease the risk of falls. (Met)       Start:  10/03/23    Expected End:  11/13/23    Resolved:  10/23/23            PT Problem       Pt will improve Romberg score by 2/6 score (Met)       Start:  10/03/23    Expected End:  11/13/23    Resolved:  10/23/23         Pt will improve gait mechanics evidenced by equal step length, increased WBing LLE, and LRAD in order to participate in ADL, IADL, work, and leisure skills   (Met)       Start:  10/03/23    Expected End:  11/13/23    Resolved:  10/23/23         Pt will increase strength of BLE musculature by at least 1 mm grade to participate in ADL, IADL, work, and leisure skills   (Not Progressing)       Start:  10/03/23    Expected End:  11/13/23    Resolved:  11/29/23         Pt will improve \"AMPAC\" score to at least 30 for increased independence and ease with ADL/IADL's, skills, and work/leisure activities.   (Not Progressing)       Start:  10/03/23    Expected End:  11/13/23    Resolved:  11/29/23 "      Goal Note       NOT MET               Pt will improve stair negotiation by use of a reciprocal pattern with single use of HR to be able to negotiate different levels of home   (Met)       Start:  10/03/23    Expected End:  11/13/23    Resolved:  10/23/23         Pt will ambulate short household sitances ~50' with use of straight cane without LOB, mod I, equal WBing through LE's, and proper aurea to improve independence of mobility within home (Not Progressing)       Start:  10/23/23    Expected End:  12/18/23    Resolved:  11/29/23                  Physical Therapy 1/24/24 Problems       Physical Therapy 1/24/24 Problems (Active)       Back Pain       Patient will be independent and adherent to HEP to enhance functional progress and long term management of condition.        Start:  01/26/24    Expected End:  03/08/24            Patient will improved Modified Oswestry score by 13 points indicating meaningful clinical change in function due to reduction of back pain.       Start:  01/26/24    Expected End:  03/08/24            Patient will report reduction of back pain by 50% or greater to ease performance of functional mobility and IADLs       Start:  01/26/24    Expected End:  03/08/24            Patient will increase LE and trunk strength by 1/3 MMT grade to facilitate muscle performance necessary for maintaining symmetrical postural alignement and functional mobility.       Start:  01/26/24    Expected End:  03/08/24            Patient will ambulate household distances with cane with mod I with no evidence of LOB or instability to improve independence in her home.        Start:  01/26/24    Expected End:  03/08/24            Patient will demonstrate Good static and dynamic standing balance.        Start:  01/26/24    Expected End:  03/08/24

## 2024-02-16 ENCOUNTER — TREATMENT (OUTPATIENT)
Dept: PHYSICAL THERAPY | Facility: CLINIC | Age: 89
End: 2024-02-16
Payer: MEDICARE

## 2024-02-16 DIAGNOSIS — M47.818 SI JOINT ARTHRITIS: ICD-10-CM

## 2024-02-16 PROCEDURE — 97110 THERAPEUTIC EXERCISES: CPT | Mod: GP

## 2024-02-16 NOTE — PROGRESS NOTES
"Physical Therapy    Physical Therapy Treatment    Patient Name: Ramirez Dobbins  MRN: [id number]  Today's Date: 2/16/2024  Visit 4  MEDICARE A&B, MMO SUPP, MED NEC, NO AUTH     Time Calculation  Start Time: 1101  Stop Time: 1143  Time Calculation (min): 42 min     Assessment:  Patient tolerated treatment well during exercises.  In fact , reports 0/10 pain with mat exercises in unloaded position.  However, pain increases temporarily with sit to stand transition and first few steps walking. Better after she starts walking with rollator. Try progressing standing exercises next session as tolerated.   Assess patient's response to this session next visit.  Patient will continue to benefit from skilled PT services to address deficits/impairments/patient goals  contributing to limited functional abilities, independence and pain.         Plan:  Progress POC as able and tolerated by patient.      Current Problem  1. SI joint arthritis  Follow Up In Physical Therapy          General  General  Reason for Referral: SI joint arthritis  Referred By: Dr. Mullins  Past Medical History Relevant to Rehab: Fall with break in pelvis, fall with fracture of T10 (per patient), Breast CA, remote left NESS    Subjective    Reports increased pain in left SI joint region.  Pain rated 4/10, and had to apply a pain patch today. Pain increased after exercises last session.  Better with walking, already walked 1780 steps in the house this morning.    Precautions  Fall risk, osteoporosis     Pain  4/10 at start of session.  Treatments:  Therapeutic Exercises  Therapeutic Exercise: (37812)- minutes   -Sci fit, seat 11, L1.3 x 8 minutes BUEs and BLEs     Seated:  Postural correction with APT <> neutral  x 10 reps     Hooklying:   -TA brace with diaphragmatic breathing  x 10 reps, 5 sec hold  -Bekah Dickson R/L leg press into the mat 5\" hold x 10 reps  -B hip abduction against green tband above knees 2 x 10 reps  -bridges 2 x 10 reps   -Bekah Dickson B " "shoulder press with scapular retraction 3\" hold 2 x 10 reps   -Bekah Yessi R/L leg lengthener 5\" hold x 10 reps   -B shoulder horizontal ABD with  green theraband, palms up/palms down 1 x 10 reps  -R/L UE PNF D2 flexion with green theraband 2 x 10 reps  - deferred 2/16/24  -R/L arm lengthener 5\" hold x 10 reps - deferred 2/16/24   - B SAQ with large bolster 2 x 10 reps, 5 sec hold      OP EDUCATION:  Continue with current HEP as previously instructed.   Instructed in correct exercise technique. Patient encouraged to continue to complete exercises on bed/couch at home versus floor as previously noted.         Goals:  Physical Therapy - Physical Therapy - August 2023 Problems       Physical Therapy - Physical Therapy - August 2023 Problems (Resolved)       Balance       Pt will reduce TUG score by at least 3 sec to decrease the risk of falls. (Met)       Start:  10/03/23    Expected End:  11/13/23    Resolved:  10/23/23            PT Problem       Pt will improve Romberg score by 2/6 score (Met)       Start:  10/03/23    Expected End:  11/13/23    Resolved:  10/23/23         Pt will improve gait mechanics evidenced by equal step length, increased WBing LLE, and LRAD in order to participate in ADL, IADL, work, and leisure skills   (Met)       Start:  10/03/23    Expected End:  11/13/23    Resolved:  10/23/23         Pt will increase strength of BLE musculature by at least 1 mm grade to participate in ADL, IADL, work, and leisure skills   (Not Progressing)       Start:  10/03/23    Expected End:  11/13/23    Resolved:  11/29/23         Pt will improve \"AMPAC\" score to at least 30 for increased independence and ease with ADL/IADL's, skills, and work/leisure activities.   (Not Progressing)       Start:  10/03/23    Expected End:  11/13/23    Resolved:  11/29/23      Goal Note       NOT MET               Pt will improve stair negotiation by use of a reciprocal pattern with single use of HR to be able to negotiate different " levels of home   (Met)       Start:  10/03/23    Expected End:  11/13/23    Resolved:  10/23/23         Pt will ambulate short household sitances ~50' with use of straight cane without LOB, mod I, equal WBing through LE's, and proper aurea to improve independence of mobility within home (Not Progressing)       Start:  10/23/23    Expected End:  12/18/23    Resolved:  11/29/23                  Physical Therapy 1/24/24 Problems       Physical Therapy 1/24/24 Problems (Active)       Back Pain       Patient will be independent and adherent to HEP to enhance functional progress and long term management of condition.        Start:  01/26/24    Expected End:  03/08/24            Patient will improved Modified Oswestry score by 13 points indicating meaningful clinical change in function due to reduction of back pain.       Start:  01/26/24    Expected End:  03/08/24            Patient will report reduction of back pain by 50% or greater to ease performance of functional mobility and IADLs       Start:  01/26/24    Expected End:  03/08/24            Patient will increase LE and trunk strength by 1/3 MMT grade to facilitate muscle performance necessary for maintaining symmetrical postural alignement and functional mobility.       Start:  01/26/24    Expected End:  03/08/24            Patient will ambulate household distances with cane with mod I with no evidence of LOB or instability to improve independence in her home.        Start:  01/26/24    Expected End:  03/08/24            Patient will demonstrate Good static and dynamic standing balance.        Start:  01/26/24    Expected End:  03/08/24

## 2024-02-18 LAB
ATRIAL RATE: 67 BPM
P AXIS: 24 DEGREES
P OFFSET: 204 MS
P ONSET: 145 MS
PR INTERVAL: 156 MS
Q ONSET: 223 MS
QRS COUNT: 11 BEATS
QRS DURATION: 78 MS
QT INTERVAL: 398 MS
QTC CALCULATION(BAZETT): 420 MS
QTC FREDERICIA: 413 MS
R AXIS: 44 DEGREES
T AXIS: 71 DEGREES
T OFFSET: 422 MS
VENTRICULAR RATE: 67 BPM

## 2024-02-20 ENCOUNTER — TREATMENT (OUTPATIENT)
Dept: PHYSICAL THERAPY | Facility: CLINIC | Age: 89
End: 2024-02-20
Payer: MEDICARE

## 2024-02-20 DIAGNOSIS — M47.818 SI JOINT ARTHRITIS: ICD-10-CM

## 2024-02-20 PROCEDURE — 97110 THERAPEUTIC EXERCISES: CPT | Mod: GP | Performed by: PHYSICAL THERAPIST

## 2024-02-20 NOTE — PROGRESS NOTES
Physical Therapy    Physical Therapy Treatment    Patient Name: Ramirez Dobbins  MRN: [id number]  Today's Date: 2/20/2024  Time Calculation  Start Time: 1130  Stop Time: 1215  Time Calculation (min): 45 min  Visit 5  MEDICARE A&B, MMO SUPP, MED NEC, NO AUTH         Current Problem  1. SI joint arthritis  Follow Up In Physical Therapy           Goals:  Physical Therapy 1/24/24 Problems       Physical Therapy 1/24/24 Problems (Active)       Back Pain       Patient will be independent and adherent to HEP to enhance functional progress and long term management of condition.        Start:  01/26/24    Expected End:  03/08/24            Patient will improved Modified Oswestry score by 13 points indicating meaningful clinical change in function due to reduction of back pain.       Start:  01/26/24    Expected End:  03/08/24            Patient will report reduction of back pain by 50% or greater to ease performance of functional mobility and IADLs       Start:  01/26/24    Expected End:  03/08/24            Patient will increase LE and trunk strength by 1/3 MMT grade to facilitate muscle performance necessary for maintaining symmetrical postural alignement and functional mobility.       Start:  01/26/24    Expected End:  03/08/24            Patient will ambulate household distances with cane with mod I with no evidence of LOB or instability to improve independence in her home.        Start:  01/26/24    Expected End:  03/08/24            Patient will demonstrate Good static and dynamic standing balance.        Start:  01/26/24    Expected End:  03/08/24                 Assessment:  Patient tolerated treatment well without complaint.  Did not progress standing exercises today due to patient reporting some 'muscle soreness' after last PT visit.  Consider progressing standing exercise next visit as tolerated.  Assess patient's response to this session next visit.  Patient will likely continue to benefit from skilled PT services to  "address deficits/impairments/patient goals  contributing to limited functional abilities, independence and pain.         Plan:  Progress POC as able and tolerated by patient.        General  General  Reason for Referral: SI joint arthritis  Referred By: Dr. Mullins  Past Medical History Relevant to Rehab: Fall with break in pelvis, fall with fracture of T10 (per patient), Breast CA, remote left NESS    Subjective    She reports that the 'leg cramps' that she typically feels in the morning were better today.  She reports that she applied an 'icy hot' patch to her low back prior to PT today which abolished her pain today.   She does not report any new falls since last PT visit.  She does report feeling some 'muscle soreness' after last visit but feeling 'okay' today.  She does report that her legs feel 'wobbly' today but she would like to participate and feels able to participate in PT today.         Precautions  Fall risk, osteoporosis     Pain  0/10 at start of session    Treatments:  Therapeutic Exercises  Therapeutic Exercise: (80924)- 42minutes   -Sci fit, seat 11, L1.3 x 8 minutes BUEs and BLEs     Seated:  Postural correction with APT <> neutral  x 10 reps  Backward shoulder rolls 2 x 10  Scapular retraction 2 x 10 bilateral  Hamstring curls with green band x 10 L/R  LAQ x 10 L/R     Hooklying:   -TA brace with diaphragmatic breathing  x 10 reps, 5 sec hold  -Bekah Yessi R/L leg press into the mat 5\" hold x 10 reps  -B hip abduction against green tband above knees 2 x 10 reps  -bridges 2 x 10 reps   -Bekah Yessi B shoulder press with scapular retraction 3\" hold 2 x 10 reps   -Bekah Yessi R/L leg lengthener 5\" hold x 10 reps   -B shoulder horizontal ABD with  green theraband, palms up/palms down 1 x 10 reps  -R/L UE PNF D2 flexion without resistance 2 x 10 reps    -R/L arm lengthener 5\" hold x 10 reps   - B SAQ with large bolster 2 x 10 reps, 5 sec hold   -Hip adduction/ball squeeze 2 x 10, 5 second hold       OP " EDUCATION:  Continue with current HEP as previously instructed.   Instructed in correct exercise technique. Patient encouraged to continue to complete exercises on bed/couch at home versus floor as previously noted.
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
## 2024-02-22 ENCOUNTER — CONSULT (OUTPATIENT)
Dept: PHYSICAL MEDICINE AND REHAB | Facility: CLINIC | Age: 89
End: 2024-02-22
Payer: MEDICARE

## 2024-02-22 VITALS
WEIGHT: 105 LBS | SYSTOLIC BLOOD PRESSURE: 174 MMHG | TEMPERATURE: 96.5 F | BODY MASS INDEX: 19.83 KG/M2 | HEART RATE: 72 BPM | OXYGEN SATURATION: 95 % | HEIGHT: 61 IN | DIASTOLIC BLOOD PRESSURE: 72 MMHG

## 2024-02-22 DIAGNOSIS — M41.9 SCOLIOSIS, UNSPECIFIED SCOLIOSIS TYPE, UNSPECIFIED SPINAL REGION: ICD-10-CM

## 2024-02-22 DIAGNOSIS — M48.061 SPINAL STENOSIS OF LUMBAR REGION, UNSPECIFIED WHETHER NEUROGENIC CLAUDICATION PRESENT: ICD-10-CM

## 2024-02-22 DIAGNOSIS — M79.18 MYOFASCIAL PAIN: ICD-10-CM

## 2024-02-22 DIAGNOSIS — M47.818 SI JOINT ARTHRITIS: ICD-10-CM

## 2024-02-22 DIAGNOSIS — M54.16 LUMBAR RADICULOPATHY: Primary | ICD-10-CM

## 2024-02-22 PROBLEM — M46.1 SI JOINT ARTHRITIS: Status: ACTIVE | Noted: 2024-02-22

## 2024-02-22 PROCEDURE — 99205 OFFICE O/P NEW HI 60 MIN: CPT | Performed by: PHYSICAL MEDICINE & REHABILITATION

## 2024-02-22 ASSESSMENT — PAIN SCALES - GENERAL: PAINLEVEL: 7

## 2024-02-22 NOTE — PATIENT INSTRUCTIONS
-Licart patch sample provided.  Let me know if it helps  -Continue Tylenol and Robaxin as needed  -Try magnesium 250-500 mg daily for muscle cramps and drink plenty of water  -Consider other medications in the future  -SI joint x-rays ordered  -Continue physical therapy, get some home exercises and start doing them daily or every other day as well  -Consider injections: Trigger point injections (handout provided), ultrasound-guided SI joint injections, referral for spine injections  -Consider lumbar MRI in the future  -Follow-up as needed, we can consider trigger point injections with follow-up.  Or follow-up for an ultrasound appointment for ultrasound-guided SI joint injections if you decide to proceed with this.

## 2024-02-23 ENCOUNTER — TREATMENT (OUTPATIENT)
Dept: PHYSICAL THERAPY | Facility: CLINIC | Age: 89
End: 2024-02-23
Payer: MEDICARE

## 2024-02-23 DIAGNOSIS — M47.818 SI JOINT ARTHRITIS: ICD-10-CM

## 2024-02-23 PROCEDURE — 97110 THERAPEUTIC EXERCISES: CPT | Mod: GP | Performed by: PHYSICAL THERAPIST

## 2024-02-23 NOTE — PROGRESS NOTES
"Physical Therapy    Physical Therapy Treatment    Patient Name: Ramirez Dobbins  MRN: [id number]  Today's Date: 2/23/2024  Time Calculation  Start Time: 1102  Stop Time: 1150  Time Calculation (min): 48 min    11:  Assessment: Patient tolerated exercises very well, reporting some soreness at end of session but no increase in pain.  The patient was encouraged to perform HEP, resting 1 day between sessions and to perform all exercises on the bed not the floor for safety. She will continue to benefit from PT to improve trunk strength and reduce pain.      Plan: Continue with  neutral trunk strengthening, adding exercises in standing position as tolerated        Current Problem  1. SI joint arthritis  Follow Up In Physical Therapy          General: Patient states that she is sore from assessment by PM&R doctor yesteray.  States that the doctor wants her to be doing exercises at home.  Patient states that she was not given any from PT. It takes her 2 days to recover from PT exercises. She was sore after adding bands to upper exercises.           Subjective    Precautions: Breast CA, monitoring radiographically, Falls, left NESS, hx of pelvic fracture, osteoporosis, Xray showing loss of height at T11, STEADi 9       Pain: lower back, has a pain patch on, pain not rated,        Objective   Treatments:  Therapeutic Exercises  Therapeutic Exercise: (94968)- 42minutes   -Sci fit, seat 11, L1.3 x 8 minutes BUEs and BLEs      Seated:  Postural correction with APT <> neutral  x 10 reps, inhale with APT, exhale with maintaining neutral pelvic posture.   Backward shoulder rolls 2 x 10, holding posture in pelvic  neutral  Scapular retraction 2 x 10 bilateral, holding posture in pelvic neutral  Hamstring curls with green band x 10 L/R (deferred)  LAQ 2x 10 L/R     Hooklying:   -Bekah Yessi R/L leg press into the mat 5\" hold x 10 reps  -Bekah Yessi B shoulder press with scapular retraction and arm press 3\" hold 2 x 10 reps   -Bekah " "Yessi R/L leg lengthener 5\" hold x 10 reps     Standing: with mirror feedback to maintain posture  -Hip extension press into stability ball maintaining upright posture with A from walker x 5, 5\" hold R/L  -Standing B long arm extension with red tubing 2 x 10       Deferred:  -TA brace with diaphragmatic breathing  x 10 reps, 5 sec hold  -B hip abduction against green tband above knees 2 x 10 reps  -bridges 2 x 10 reps   -B shoulder horizontal ABD with  green theraband, palms up/palms down 1 x 10 reps  -R/L UE PNF D2 flexion without resistance 2 x 10 reps    -R/L arm lengthener 5\" hold x 10 reps   - B SAQ with large bolster 2 x 10 reps, 5 sec hold   -Hip adduction/ball squeeze 2 x 10, 5 second hold     OP EDUCATION: Updated HEP as noted below, encouraging patient to perform exercises on bed, not on floor.     -Yessi shoulder press  -Yessi Leg lengthener    Access Code: [email or web address]  URL: [email or web address]  Date: 02/23/2024  Prepared by: Michelle Kapoor    Exercises  - Hooklying Transversus Abdominis Palpation  - 1 x daily - 7 x weekly - 2 sets - 10 reps - 5 seconds hold  - Supine Hip Adduction Isometric with Ball  - 1 x daily - 7 x weekly - 2 sets - 10 reps - 5 seconds hold  - Hooklying Clamshell with Resistance  - 1 x daily - 7 x weekly - 2 sets - 10 reps - 5 seconds hold  - Seated Correct Posture  - 1 x daily - 7 x weekly - 1-2 sets - 10 reps       Goals:  Physical Therapy - Physical Therapy - August 2023 Problems       Physical Therapy - Physical Therapy - August 2023 Problems (Resolved)       Balance       Pt will reduce TUG score by at least 3 sec to decrease the risk of falls. (Met)       Start:  10/03/23    Expected End:  11/13/23    Resolved:  10/23/23            PT Problem       Pt will improve Romberg score by 2/6 score (Met)       Start:  10/03/23    Expected End:  11/13/23    Resolved:  10/23/23         Pt will improve gait mechanics evidenced by equal step length, increased WBing " "LLE, and LRAD in order to participate in ADL, IADL, work, and leisure skills   (Met)       Start:  10/03/23    Expected End:  11/13/23    Resolved:  10/23/23         Pt will increase strength of BLE musculature by at least 1 mm grade to participate in ADL, IADL, work, and leisure skills   (Not Progressing)       Start:  10/03/23    Expected End:  11/13/23    Resolved:  11/29/23         Pt will improve \"AMPAC\" score to at least 30 for increased independence and ease with ADL/IADL's, skills, and work/leisure activities.   (Not Progressing)       Start:  10/03/23    Expected End:  11/13/23    Resolved:  11/29/23      Goal Note       NOT MET               Pt will improve stair negotiation by use of a reciprocal pattern with single use of HR to be able to negotiate different levels of home   (Met)       Start:  10/03/23    Expected End:  11/13/23    Resolved:  10/23/23         Pt will ambulate short household sitances ~50' with use of straight cane without LOB, mod I, equal WBing through LE's, and proper aurea to improve independence of mobility within home (Not Progressing)       Start:  10/23/23    Expected End:  12/18/23    Resolved:  11/29/23                  Physical Therapy 1/24/24 Problems       Physical Therapy 1/24/24 Problems (Active)       Back Pain       Patient will be independent and adherent to HEP to enhance functional progress and long term management of condition.        Start:  01/26/24    Expected End:  03/08/24            Patient will improved Modified Oswestry score by 13 points indicating meaningful clinical change in function due to reduction of back pain.       Start:  01/26/24    Expected End:  03/08/24            Patient will report reduction of back pain by 50% or greater to ease performance of functional mobility and IADLs       Start:  01/26/24    Expected End:  03/08/24            Patient will increase LE and trunk strength by 1/3 MMT grade to facilitate muscle performance necessary for " maintaining symmetrical postural alignement and functional mobility.       Start:  01/26/24    Expected End:  03/08/24            Patient will ambulate household distances with cane with mod I with no evidence of LOB or instability to improve independence in her home.        Start:  01/26/24    Expected End:  03/08/24            Patient will demonstrate Good static and dynamic standing balance.        Start:  01/26/24    Expected End:  03/08/24

## 2024-02-27 ENCOUNTER — TREATMENT (OUTPATIENT)
Dept: PHYSICAL THERAPY | Facility: CLINIC | Age: 89
End: 2024-02-27
Payer: MEDICARE

## 2024-02-27 DIAGNOSIS — S39.012D STRAIN OF LUMBAR REGION, SUBSEQUENT ENCOUNTER: Primary | ICD-10-CM

## 2024-02-27 DIAGNOSIS — M47.818 SI JOINT ARTHRITIS: ICD-10-CM

## 2024-02-27 PROCEDURE — 97110 THERAPEUTIC EXERCISES: CPT | Mod: GP

## 2024-02-27 RX ORDER — DICLOFENAC EPOLAMINE 0.01 G/1
1 SYSTEM TOPICAL DAILY PRN
Qty: 30 PATCH | Refills: 3 | Status: SHIPPED | OUTPATIENT
Start: 2024-02-27 | End: 2024-03-28

## 2024-02-27 NOTE — PROGRESS NOTES
"Physical Therapy    Physical Therapy Treatment    Patient Name: Ramirez Dobbins  MRN: [id number]  Today's Date: 2/27/2024  Time Calculation  Start Time: 1130  Stop Time: 1213  Time Calculation (min): 43 min    Visit 21      Assessment:  Again, patient tolerated exercises very well, reporting some soreness at end of session but no increase in pain.  She reported she enjoyed the standing exercises and felt they were beneficial.  She will continue to benefit from PT to improve trunk strength and reduce pain.      Plan:   Continue with  neutral trunk strengthening, adding exercises in standing position as tolerated           Current Problem  1. SI joint arthritis  Follow Up In Physical Therapy             Subjective    Reports stiffness, not pain at present. Wearing a pain patch on B LS region. Denies recent falls. Reports she was sore for a day following last session.      Precautions  Breast CA, monitoring radiographically, Falls, left NESS, hx of pelvic fracture, osteoporosis, Xray showing loss of height at T11, STEADi 9      Pain  0/10 at start     Treatments:  Therapeutic Exercises  Therapeutic Exercise: (48951)- 42minutes   -Sci fit, seat 11, L1.3 x 8 minutes BUEs and BLEs - deferred this date 2/27/24, not available     Seated:  -Postural correction with APT <> neutral  x 10 reps, inhale with APT, exhale with maintaining neutral pelvic posture.   -Backward shoulder rolls 2 x 10 and B shoulder shrugs 1 x 20 reps, holding posture in pelvic  neutral  -Scapular retraction 2 x 10 bilateral, holding posture in pelvic neutral  -Alt R/L UE lifts 2 x 10 reps   -B shoulder flexion with PVC wand to about 100 deg 2 x 10 reps     Hooklying:   -Bekah Yessi R/L leg press into the mat 5\" hold  2 x 10 reps  -Bekah Yessi B shoulder press with scapular retraction and arm press 3\" hold 2 x 10 reps   -Bekah Yessi R/L leg lengthener 5\" hold x 10 reps  -B shoulder horizontal ABD 2 x 10 reps (no theraband)  -R/L alt UE lifts 2 x 10 reps (no " "theraband)      Standing: with mirror feedback to maintain posture  -Hip extension press into stability ball maintaining upright posture with A from walker 2  x 5, 5\" hold R/L  -Standing B long arm extension with red tubing 2 x 10      OP EDUCATION:  Encouraging patient to perform exercises on bed, not on floor. Continue with current HEP.      -Yessi shoulder press  -Yessi Leg lengthener     Access Code: [email or web address]  URL: [email or web address]  Date: 02/23/2024  Prepared by: Michelle Kapoor     Exercises  - Hooklying Transversus Abdominis Palpation  - 1 x daily - 7 x weekly - 2 sets - 10 reps - 5 seconds hold  - Supine Hip Adduction Isometric with Ball  - 1 x daily - 7 x weekly - 2 sets - 10 reps - 5 seconds hold  - Hooklying Clamshell with Resistance  - 1 x daily - 7 x weekly - 2 sets - 10 reps - 5 seconds hold  - Seated Correct Posture  - 1 x daily - 7 x weekly - 1-2 sets - 10 reps       Goals:  Physical Therapy - Physical Therapy - August 2023 Problems       Physical Therapy - Physical Therapy - August 2023 Problems (Resolved)       Balance       Pt will reduce TUG score by at least 3 sec to decrease the risk of falls. (Met)       Start:  10/03/23    Expected End:  11/13/23    Resolved:  10/23/23            PT Problem       Pt will improve Romberg score by 2/6 score (Met)       Start:  10/03/23    Expected End:  11/13/23    Resolved:  10/23/23         Pt will improve gait mechanics evidenced by equal step length, increased WBing LLE, and LRAD in order to participate in ADL, IADL, work, and leisure skills   (Met)       Start:  10/03/23    Expected End:  11/13/23    Resolved:  10/23/23         Pt will increase strength of BLE musculature by at least 1 mm grade to participate in ADL, IADL, work, and leisure skills   (Not Progressing)       Start:  10/03/23    Expected End:  11/13/23    Resolved:  11/29/23         Pt will improve \"AMPAC\" score to at least 30 for increased independence and ease " with ADL/IADL's, skills, and work/leisure activities.   (Not Progressing)       Start:  10/03/23    Expected End:  11/13/23    Resolved:  11/29/23      Goal Note       NOT MET               Pt will improve stair negotiation by use of a reciprocal pattern with single use of HR to be able to negotiate different levels of home   (Met)       Start:  10/03/23    Expected End:  11/13/23    Resolved:  10/23/23         Pt will ambulate short household sitances ~50' with use of straight cane without LOB, mod I, equal WBing through LE's, and proper aurea to improve independence of mobility within home (Not Progressing)       Start:  10/23/23    Expected End:  12/18/23    Resolved:  11/29/23                  Physical Therapy 1/24/24 Problems       Physical Therapy 1/24/24 Problems (Active)       Back Pain       Patient will be independent and adherent to HEP to enhance functional progress and long term management of condition.        Start:  01/26/24    Expected End:  03/08/24            Patient will improved Modified Oswestry score by 13 points indicating meaningful clinical change in function due to reduction of back pain.       Start:  01/26/24    Expected End:  03/08/24            Patient will report reduction of back pain by 50% or greater to ease performance of functional mobility and IADLs       Start:  01/26/24    Expected End:  03/08/24            Patient will increase LE and trunk strength by 1/3 MMT grade to facilitate muscle performance necessary for maintaining symmetrical postural alignement and functional mobility.       Start:  01/26/24    Expected End:  03/08/24            Patient will ambulate household distances with cane with mod I with no evidence of LOB or instability to improve independence in her home.        Start:  01/26/24    Expected End:  03/08/24            Patient will demonstrate Good static and dynamic standing balance.        Start:  01/26/24    Expected End:  03/08/24

## 2024-03-01 ENCOUNTER — TREATMENT (OUTPATIENT)
Dept: PHYSICAL THERAPY | Facility: CLINIC | Age: 89
End: 2024-03-01
Payer: MEDICARE

## 2024-03-01 DIAGNOSIS — M47.818 SI JOINT ARTHRITIS: ICD-10-CM

## 2024-03-01 PROCEDURE — 97110 THERAPEUTIC EXERCISES: CPT | Mod: GP

## 2024-03-01 NOTE — PROGRESS NOTES
Physical Therapy    Physical Therapy Treatment    Patient Name: Ramirez Dobbins  MRN: [id number]  Today's Date: 3/1/2024  Time Calculation  Start Time: 1119  Stop Time: 1159  Time Calculation (min): 40 min      Assessment:   Patient did not note any increased pain during exercises this date, but did report pain when completing sit to stand transfers. Patient demonstrated good technique with exercises requiring minimal verbal cueing. Patient was heavily educated on importance of completing clinic and home exercises within a pain free ROM and holding any exercises that cause increased pain. Patient noted no increased pain at end of session. Assess patient's response to this session next visit and progress program as able and tolerated.     Plan:   Progress POC as able and tolerated.     Current Problem  1. SI joint arthritis  Follow Up In Physical Therapy          General     General  Reason for Referral: SI joint arthritis  Referred By: Dr. Mullins  Past Medical History Relevant to Rehab: Fall with break in pelvis, fall with fracture of T10 (per patient), Breast CA, remote left NESS    Subjective    The patient reports she is having a rough day today. Notes she did the exercises yesterday and she feels sore. Notes she has the most pain when she bends or twists or when she is standing up straight with upon completing sit to stand. Felt okay after last session. Wants to continue with standing exercises. Notes HEP is going well.     Precautions  Fall risk   Pain   6/10 pain in low back at start of session. Patient noted no change in pain at end of session and left therapy in no distress.     Objective     Treatments:  Therapeutic Exercise: (18186)- 40 minutes   -Sci fit, seat 11, L1.0 x 8 minutes BUEs and BLEs     Seated in chair with back support:   -Backward shoulder rolls 2 x 10 and B shoulder shrugs 1 x 20 reps, holding posture in pelvic in neutral; verbal cueing for technique   -Postural correction with APT <> neutral   "x 10 reps in neutral trunk positioning   -Scapular retraction 2 x 10 bilateral, holding posture in pelvic neutral   -B shoulder flexion with PVC wand to about  deg 2 x 10 reps     Hooklying with wedge and single pillow:   -Bekah Yessi B shoulder press with scapular retraction and arm press 5\" hold 2 x 10 reps   -Bekah Yessi R/L leg press into the mat 5\" hold  2 x 10 reps  -B glute set in supine 2 x 10 reps, 5 sec hold   -B shoulder horizontal ABD 2 x 10 reps (no theraband)  -Hooklying B hip adduction ball squeeze 2 x 10 reps, 5 sec    -Alt arm lifts x 10 reps each R/L     Standing: with mirror feedback to maintain posture  -B heel/toe raises with BUE support 2 x 10 reps   -Side stepping in // bars x 2 laps no UE support; SBA    OP EDUCATION:   Correct exercise technique and emphasis on completing exercises within a pain free ROM and holding any exercises that cause increased pain.       Goals:  Physical Therapy - Physical Therapy - August 2023 Problems       Physical Therapy - Physical Therapy - August 2023 Problems (Resolved)       Balance       Pt will reduce TUG score by at least 3 sec to decrease the risk of falls. (Met)       Start:  10/03/23    Expected End:  11/13/23    Resolved:  10/23/23            PT Problem       Pt will improve Romberg score by 2/6 score (Met)       Start:  10/03/23    Expected End:  11/13/23    Resolved:  10/23/23         Pt will improve gait mechanics evidenced by equal step length, increased WBing LLE, and LRAD in order to participate in ADL, IADL, work, and leisure skills   (Met)       Start:  10/03/23    Expected End:  11/13/23    Resolved:  10/23/23         Pt will increase strength of BLE musculature by at least 1 mm grade to participate in ADL, IADL, work, and leisure skills   (Not Progressing)       Start:  10/03/23    Expected End:  11/13/23    Resolved:  11/29/23         Pt will improve \"AMPAC\" score to at least 30 for increased independence and ease with ADL/IADL's, " skills, and work/leisure activities.   (Not Progressing)       Start:  10/03/23    Expected End:  11/13/23    Resolved:  11/29/23      Goal Note       NOT MET               Pt will improve stair negotiation by use of a reciprocal pattern with single use of HR to be able to negotiate different levels of home   (Met)       Start:  10/03/23    Expected End:  11/13/23    Resolved:  10/23/23         Pt will ambulate short household sitances ~50' with use of straight cane without LOB, mod I, equal WBing through LE's, and proper aurea to improve independence of mobility within home (Not Progressing)       Start:  10/23/23    Expected End:  12/18/23    Resolved:  11/29/23                  Physical Therapy 1/24/24 Problems       Physical Therapy 1/24/24 Problems (Active)       Back Pain       Patient will be independent and adherent to HEP to enhance functional progress and long term management of condition.        Start:  01/26/24    Expected End:  03/08/24            Patient will improved Modified Oswestry score by 13 points indicating meaningful clinical change in function due to reduction of back pain.       Start:  01/26/24    Expected End:  03/08/24            Patient will report reduction of back pain by 50% or greater to ease performance of functional mobility and IADLs       Start:  01/26/24    Expected End:  03/08/24            Patient will increase LE and trunk strength by 1/3 MMT grade to facilitate muscle performance necessary for maintaining symmetrical postural alignement and functional mobility.       Start:  01/26/24    Expected End:  03/08/24            Patient will ambulate household distances with cane with mod I with no evidence of LOB or instability to improve independence in her home.        Start:  01/26/24    Expected End:  03/08/24            Patient will demonstrate Good static and dynamic standing balance.        Start:  01/26/24    Expected End:  03/08/24

## 2024-03-05 ENCOUNTER — TREATMENT (OUTPATIENT)
Dept: PHYSICAL THERAPY | Facility: CLINIC | Age: 89
End: 2024-03-05
Payer: MEDICARE

## 2024-03-05 DIAGNOSIS — M47.818 SI JOINT ARTHRITIS: ICD-10-CM

## 2024-03-05 PROCEDURE — 97110 THERAPEUTIC EXERCISES: CPT | Mod: GP | Performed by: PHYSICAL THERAPIST

## 2024-03-05 NOTE — PROGRESS NOTES
"    Physical Therapy    Physical Therapy Treatment    Patient Name: Ramirez Dobbins  MRN: [id number]  Today's Date: 3/5/2024  Time Calculation  Start Time: 1130  Stop Time: 1215  Time Calculation (min): 45 min      Current Problem  1. SI joint arthritis  Follow Up In Physical Therapy          Goals:  Physical Therapy - Physical Therapy - August 2023 Problems       Physical Therapy - Physical Therapy - August 2023 Problems (Resolved)       Balance       Pt will reduce TUG score by at least 3 sec to decrease the risk of falls. (Met)       Start:  10/03/23    Expected End:  11/13/23    Resolved:  10/23/23            PT Problem       Pt will improve Romberg score by 2/6 score (Met)       Start:  10/03/23    Expected End:  11/13/23    Resolved:  10/23/23         Pt will improve gait mechanics evidenced by equal step length, increased WBing LLE, and LRAD in order to participate in ADL, IADL, work, and leisure skills   (Met)       Start:  10/03/23    Expected End:  11/13/23    Resolved:  10/23/23         Pt will increase strength of BLE musculature by at least 1 mm grade to participate in ADL, IADL, work, and leisure skills   (Not Progressing)       Start:  10/03/23    Expected End:  11/13/23    Resolved:  11/29/23         Pt will improve \"AMPAC\" score to at least 30 for increased independence and ease with ADL/IADL's, skills, and work/leisure activities.   (Not Progressing)       Start:  10/03/23    Expected End:  11/13/23    Resolved:  11/29/23      Goal Note       NOT MET               Pt will improve stair negotiation by use of a reciprocal pattern with single use of HR to be able to negotiate different levels of home   (Met)       Start:  10/03/23    Expected End:  11/13/23    Resolved:  10/23/23         Pt will ambulate short household sitances ~50' with use of straight cane without LOB, mod I, equal WBing through LE's, and proper aurea to improve independence of mobility within home (Not Progressing)       Start:  " 10/23/23    Expected End:  12/18/23    Resolved:  11/29/23                  Physical Therapy 1/24/24 Problems       Physical Therapy 1/24/24 Problems (Active)       Back Pain       Patient will be independent and adherent to HEP to enhance functional progress and long term management of condition.        Start:  01/26/24    Expected End:  03/08/24            Patient will improved Modified Oswestry score by 13 points indicating meaningful clinical change in function due to reduction of back pain.       Start:  01/26/24    Expected End:  03/08/24            Patient will report reduction of back pain by 50% or greater to ease performance of functional mobility and IADLs       Start:  01/26/24    Expected End:  03/08/24            Patient will increase LE and trunk strength by 1/3 MMT grade to facilitate muscle performance necessary for maintaining symmetrical postural alignement and functional mobility.       Start:  01/26/24    Expected End:  03/08/24            Patient will ambulate household distances with cane with mod I with no evidence of LOB or instability to improve independence in her home.        Start:  01/26/24    Expected End:  03/08/24            Patient will demonstrate Good static and dynamic standing balance.        Start:  01/26/24    Expected End:  03/08/24                 Assessment:   Patient did not note any increased pain after exercise today.  She reports occasional pain with movement.   Patient demonstrated good technique with exercises requiring minimal verbal cueing.  Patient noted no increased pain at end of session.  Re-evaluate for further PT needs next visit.     Plan:  Re-evaluation next visit        General  Reason for Referral: SI joint arthritis  Referred By: Dr. Mullins  Past Medical History Relevant to Rehab: Fall with break in pelvis, fall with fracture of T10 (per patient), Breast CA, remote left NESS    Subjective    Pt reports 0/10 pain at rest and up to 6/10 intermittently with  "movement such as trunk rotation.    Felt okay after last session. Wants to continue with standing exercises. Notes HEP is going well.     Precautions  Fall risk     Pain   0-6/10 pain in low back at start of session. Patient noted no change in pain at end of session and left therapy in no distress.     Objective     Treatments:  Therapeutic Exercise: (92456)- 40 minutes   -Sci fit, seat 11, L1.0 x 8 minutes BUEs and BLEs     Seated in chair with back support:   -Backward shoulder rolls 2 x 10 and B shoulder shrugs 2 x 10 reps, holding posture in pelvic in neutral; verbal cueing for technique   -Postural correction with APT <> neutral  x 10 reps in neutral trunk positioning   -Scapular retraction 2 x 10 bilateral, holding posture in pelvic neutral   -B shoulder flexion with PVC wand to about  deg 2 x 10 reps     Hooklying with wedge and single pillow:   -Bekah Yessi B shoulder press with scapular retraction and arm press 5\" hold 2 x 10 reps   -Bekah Yessi R/L leg press into the mat 5\" hold  2 x 10 reps  -B glute set in supine 2 x 10 reps, 5 sec hold   -B shoulder horizontal ABD 2 x 10 reps (no theraband)  -Hooklying B hip adduction ball squeeze 2 x 10 reps, 5 sec    -Alt arm lifts 2 x 10 reps each R/L     Standing: with mirror feedback to maintain posture  -B heel/toe raises with BUE support 2 x 10 reps   -Side stepping in // bars x 2 laps no UE support; SBA  -Hip extension press into stability ball maintaining upright posture with A from walker 2  x 10, 5\" hold R/L  -Standing B long arm extension with red tubing 2 x 10     OP EDUCATION:   Correct exercise technique and emphasis on completing exercises within a pain free ROM and holding any exercises that cause increased pain.     Progressed HEP and provided handouts of new exercises  Access Code: [email or web address]  URL: [email or web address]  Date: 03/05/2024  Prepared by: Kezia Godoy    Program Notes  Discontinue exercise if you have " pain    Exercises  - Seated Backward Shoulder Rolls  - 1 x daily - 1-2 sets - 10 reps  - Seated Scapular Retraction  - 1 x daily - 1-2 sets - 10 reps  - Seated Transversus Abdominis Bracing  - 1 x daily - 1-2 sets - 10 reps - 5 second hold  - Seated Shoulder Flexion with Dowel to 90  - 1 x daily - 1-2 sets - 10 reps

## 2024-03-07 NOTE — PATIENT INSTRUCTIONS
-Ice on and off for the next 24 hours if injection sites are sore. Do gentle range of motion exercises in each area that was injected. Try to do them every hour for about half a minute or so, in every direction that the affected part goes. No pool, bath, or hot tub today. Avoid heavy lifting for the next 2 days.    -Licart patch ordered and script for generic diclofenac patch provided as well  -Continue Tylenol and Robaxin as needed  -Try magnesium 250-500 mg daily for muscle cramps and drink plenty of water  -Consider other medications in the future  -SI joint x-rays ordered previously  -Continue physical therapy, get some home exercises and start doing them daily or every other day as well  -Consider injections: ultrasound-guided SI joint injections, referral for spine injections  -Consider lumbar MRI in the future  -Follow-up 3-5 weeks
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
## 2024-03-08 ENCOUNTER — PROCEDURE VISIT (OUTPATIENT)
Dept: PHYSICAL MEDICINE AND REHAB | Facility: CLINIC | Age: 89
End: 2024-03-08
Payer: MEDICARE

## 2024-03-08 ENCOUNTER — HOSPITAL ENCOUNTER (OUTPATIENT)
Dept: RADIOLOGY | Facility: CLINIC | Age: 89
Discharge: HOME | End: 2024-03-08
Payer: MEDICARE

## 2024-03-08 VITALS
DIASTOLIC BLOOD PRESSURE: 66 MMHG | WEIGHT: 105 LBS | SYSTOLIC BLOOD PRESSURE: 145 MMHG | TEMPERATURE: 96.7 F | BODY MASS INDEX: 19.83 KG/M2 | HEIGHT: 61 IN | OXYGEN SATURATION: 94 % | HEART RATE: 65 BPM

## 2024-03-08 DIAGNOSIS — M41.9 SCOLIOSIS, UNSPECIFIED SCOLIOSIS TYPE, UNSPECIFIED SPINAL REGION: ICD-10-CM

## 2024-03-08 DIAGNOSIS — S39.012D STRAIN OF LUMBAR REGION, SUBSEQUENT ENCOUNTER: ICD-10-CM

## 2024-03-08 DIAGNOSIS — M48.061 SPINAL STENOSIS OF LUMBAR REGION, UNSPECIFIED WHETHER NEUROGENIC CLAUDICATION PRESENT: ICD-10-CM

## 2024-03-08 DIAGNOSIS — M79.18 MYOFASCIAL PAIN: Primary | ICD-10-CM

## 2024-03-08 DIAGNOSIS — M47.818 SI JOINT ARTHRITIS: ICD-10-CM

## 2024-03-08 DIAGNOSIS — M54.16 LUMBAR RADICULOPATHY: ICD-10-CM

## 2024-03-08 PROCEDURE — 72202 X-RAY EXAM SI JOINTS 3/> VWS: CPT

## 2024-03-08 PROCEDURE — 20553 NJX 1/MLT TRIGGER POINTS 3/>: CPT | Performed by: PHYSICAL MEDICINE & REHABILITATION

## 2024-03-08 PROCEDURE — 72202 X-RAY EXAM SI JOINTS 3/> VWS: CPT | Performed by: RADIOLOGY

## 2024-03-08 ASSESSMENT — PAIN SCALES - GENERAL: PAINLEVEL: 6

## 2024-03-11 ENCOUNTER — TELEPHONE (OUTPATIENT)
Dept: PHYSICAL MEDICINE AND REHAB | Facility: CLINIC | Age: 89
End: 2024-03-11
Payer: MEDICARE

## 2024-03-11 NOTE — TELEPHONE ENCOUNTER
----- Message from Ignacia Temple MD sent at 3/10/2024 12:00 AM EST -----  Pls let her or her daughter know that her SI joint xrays showed arthritis in the Left SI joint and auto fusion in the right SI joint as we know about. They should continue with the plan and I'll see her in follow up. thanks  ----- Message -----  From: Interface, Radiology Results In  Sent: 3/9/2024   3:27 PM EST  To: Ignacia Temple MD

## 2024-03-22 ENCOUNTER — OFFICE VISIT (OUTPATIENT)
Dept: OTOLARYNGOLOGY | Facility: CLINIC | Age: 89
End: 2024-03-22
Payer: MEDICARE

## 2024-03-22 VITALS — BODY MASS INDEX: 19.83 KG/M2 | HEIGHT: 61 IN | WEIGHT: 105 LBS

## 2024-03-22 DIAGNOSIS — H92.10 OTORRHEA, UNSPECIFIED LATERALITY: ICD-10-CM

## 2024-03-22 DIAGNOSIS — H60.509 ACUTE OTITIS EXTERNA, UNSPECIFIED LATERALITY, UNSPECIFIED TYPE: ICD-10-CM

## 2024-03-22 DIAGNOSIS — H90.12 CONDUCTIVE HEARING LOSS OF LEFT EAR WITH UNRESTRICTED HEARING OF RIGHT EAR: ICD-10-CM

## 2024-03-22 DIAGNOSIS — H74.42 POLYP OF LEFT MIDDLE EAR: Primary | ICD-10-CM

## 2024-03-22 PROCEDURE — 99203 OFFICE O/P NEW LOW 30 MIN: CPT | Performed by: OTOLARYNGOLOGY

## 2024-03-22 PROCEDURE — 1160F RVW MEDS BY RX/DR IN RCRD: CPT | Performed by: OTOLARYNGOLOGY

## 2024-03-22 PROCEDURE — 1036F TOBACCO NON-USER: CPT | Performed by: OTOLARYNGOLOGY

## 2024-03-22 PROCEDURE — 1157F ADVNC CARE PLAN IN RCRD: CPT | Performed by: OTOLARYNGOLOGY

## 2024-03-22 PROCEDURE — 1159F MED LIST DOCD IN RCRD: CPT | Performed by: OTOLARYNGOLOGY

## 2024-03-22 RX ORDER — AMOXICILLIN 250 MG
1 CAPSULE ORAL DAILY
COMMUNITY

## 2024-03-22 RX ORDER — CIPROFLOXACIN AND DEXAMETHASONE 3; 1 MG/ML; MG/ML
SUSPENSION/ DROPS AURICULAR (OTIC)
Qty: 7.5 ML | Refills: 1 | Status: SHIPPED | OUTPATIENT
Start: 2024-03-22

## 2024-03-22 NOTE — PROGRESS NOTES
Chief Complaint   Patient presents with    New Patient Visit     NP- EAR CK, LT EAR MUFFLED      HPI:  Ramirez Dobbins is a 89 y.o. female who presents today with a 3-month history of some left-sided hearing loss and blocked ear.  Occasional drainage.  No help with antibiotic therapy or flushing out the ear of any wax.  No prior otologic history including no prior otologic surgery.    PMH:  Past Medical History:   Diagnosis Date    Acetabulum fracture, left (CMS/[facility]) 10/05/2023    Body mass index (BMI) 21.0-21.9, adult 05/24/2022    BMI 21.0-21.9, adult    Cough, unspecified 04/05/2016    Cough    Encounter for screening mammogram for malignant neoplasm of breast     Visit for screening mammogram    Iliotibial band syndrome, left leg 07/01/2020    Iliotibial band syndrome of left side    Other forms of dyspnea 07/08/2021    Dyspnea on exertion    Pain in unspecified hip 01/02/2015    Joint pain, hip    Personal history of other diseases of the respiratory system 04/29/2015    History of acute sinusitis    Personal history of other diseases of the respiratory system 04/20/2016    History of acute bronchitis    Personal history of other specified conditions 06/23/2015    History of vertigo    Personal history of urinary (tract) infections 09/01/2016    History of urinary tract infection    Personal history of urinary (tract) infections 11/15/2013    History of urinary tract infection    Sprain of unspecified ligament of left ankle, initial encounter 06/03/2016    Left ankle sprain    Sprain of unspecified part of unspecified wrist and hand, initial encounter 06/27/2013    Hand sprain     Past Surgical History:   Procedure Laterality Date    CHOLECYSTECTOMY  05/05/2016    Cholecystectomy    JOINT REPLACEMENT Left 2014    total hip surgery         Medications:     Current Outpatient Medications:     amLODIPine (Norvasc) 5 mg tablet, Take 1 tablet (5 mg) by mouth once daily., Disp: 90 tablet, Rfl: 1    apixaban (Eliquis)  2.5 mg tablet, Take 1 tablet (2.5 mg) by mouth every 12 hours., Disp: 180 tablet, Rfl: 3    cholecalciferol (Vitamin D-3) 50 MCG (2000 UT) tablet, Take 1 tablet (2,000 Units) by mouth once daily., Disp: , Rfl:     famotidine (Pepcid) 20 mg tablet, TAKE 1 TABLET BY MOUTH TWICE A DAY (Patient taking differently: Take 1 tablet (20 mg) by mouth 2 times a day. TO DECREASE STOMACH ACID), Disp: 90 tablet, Rfl: 3    levalbuterol (Xopenex) 45 mcg/actuation inhaler, Inhale 1-2 puffs every 4 hours if needed for wheezing or shortness of breath., Disp: , Rfl:     melatonin 1 mg tablet,chewable, Chew., Disp: , Rfl:     metoprolol succinate XL (Toprol-XL) 200 mg 24 hr tablet, TAKE 1 TABLET BY MOUTH EVERY DAY, Disp: 90 tablet, Rfl: 3    multivit-min/vit C/herb no.124 ([place], ASCORBIC ACID, ORAL), Take by mouth., Disp: , Rfl:     sennosides-docusate sodium (Rosa Maria-Colace) 8.6-50 mg tablet, Take 1 tablet by mouth once daily., Disp: , Rfl:     tamoxifen (Nolvadex) 20 mg tablet, Take 1 tablet (20 mg total) by mouth once daily., Disp: 90 tablet, Rfl: 1    Trelegy Ellipta 200-62.5-25 mcg blister with device, INHALE 1 PUFF ,ONCE DAILY (Patient taking differently: No sig reported), Disp: 60 each, Rfl: 11    calcitonin, salmon, (Miacalcin) 200 unit/actuation nasal spray, Administer 1 spray into one nostril once daily. (Patient not taking: Reported on 3/22/2024), Disp: 3.7 mL, Rfl: 11    ciprofloxacin-dexamethasone (CiproDEX) otic suspension, 4 drops to affected ear/s twice daily for 10 days. 1 bottle. 1 refill., Disp: 7.5 mL, Rfl: 1    diclofenac epolamine (Licart) 1.3 % patch 24 hour, Place 1 patch on the skin once daily as needed (pain). (Patient not taking: Reported on 3/22/2024), Disp: 30 patch, Rfl: 3    diclofenac epolamine 1.3 % patch 24 hour, Place 1 patch on the skin once daily as needed (pain). (Patient not taking: Reported on 3/22/2024), Disp: 30 patch, Rfl: 3    lidocaine (Lidoderm) 5 % patch, Place 1 patch on the skin once  "daily. Remove & discard patch within 12 hours or as directed by MD., Disp: , Rfl:     methocarbamol (Robaxin) 500 mg tablet, Take 0.5 tablets (250 mg) by mouth 3 times a day as needed for muscle spasms., Disp: 40 tablet, Rfl: 2     Allergies:  Allergies   Allergen Reactions    Codeine Hallucinations    Nitrofurantoin Dizziness and Hallucinations    Penicillins Hives    Albuterol Unknown    Sulfamethoxazole-Trimethoprim Unknown        ROS:  Review of systems normal unless stated otherwise in the HPI and/or PMH.    Physical Exam:  Height 1.549 m (5' 1\"), weight 47.6 kg (105 lb). Body mass index is 19.84 kg/m².     GENERAL APPEARANCE: Well developed and well nourished.  Alert and oriented in no acute distress.  Normal vocal quality.      HEAD/FACE: No erythema or edema or facial tenderness.  Normal facial nerve function bilaterally.    EAR:       EXTERNAL: Normal pinnas and external auditory canals without lesion or obstructing wax.  Microscopic exam of the left side shows the left ear canal is completely obstructed with granulation polyp.  This was debrided and removed with microscope and suction revealing stock at the left epitympanum.       MIDDLE EAR: Tympanic membranes intact and mobile with normal landmarks.  Middle ear space appears well aerated.       TUBE STATUS: N/A       MASTOID CAVITY: N/A       HEARING: Gross hearing assessment is within normal limits.      NOSE:       VISUALIZED USING: Anterior rhinoscopy with headlight and nasal speculum.       DORSUM: Midline, nontraumatic appearance.       MUCOSA: Normal-appearing.       SECRETIONS: Normal.       SEPTUM: Midline and nonobstructing.       INFERIOR TURBINATES: Normal.       MIDDLE TURBINATES/MEATUS: N/A       BLEEDING: N/A         ORAL CAVITY/PHARYNX:       TEETH: Adequate dentition.       TONGUE: No mass or lesion.  Normal mobility.       FLOOR OF MOUTH: No mass or lesion.       PALATE: Normal hard palate, soft palate, and uvula.       OROPHARYNX: " Normal without mass or lesion.       BUCCAL MUCOSA/GBS: Normal without mass or lesion.       LIPS: Normal.    LARYNX/HYPOPHARYNX/NASOPHARYNX: N/A    NECK: No palpable masses or abnormal adenopathy.  Trachea is midline.    THYROID: No thyromegaly or palpable nodule.    SALIVARY GLANDS: Normal bilateral parotid and submandibular glands by inspection and palpation.    TMJ's: Normal.    NEURO: Cranial nerve exam grossly normal bilaterally.       Assessment/Plan   Ramirez was seen today for new patient visit.  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 1 bottle. 1 refill.  Otorrhea, unspecified laterality  -     ciprofloxacin-dexamethasone (CiproDEX) otic suspension; 4 drops to affected ear/s twice daily for 10 days. 1 bottle. 1 refill.     Left granulation polyp from the epitympanum was removed and debrided today.  This did return her hearing.  Will try to control any disease with Ciprodex and have her follow-up in 2 to 3 weeks with an audiogram.  Will continue to assess middle ear for any middle ear disease such as further polyps or cholesteatoma.  Follow up for audiogram.     Naldo Patel MD

## 2024-03-25 DIAGNOSIS — I10 PRIMARY HYPERTENSION: ICD-10-CM

## 2024-03-26 ENCOUNTER — OFFICE VISIT (OUTPATIENT)
Dept: HEMATOLOGY/ONCOLOGY | Facility: CLINIC | Age: 89
End: 2024-03-26
Payer: MEDICARE

## 2024-03-26 VITALS
SYSTOLIC BLOOD PRESSURE: 130 MMHG | WEIGHT: 107.81 LBS | HEART RATE: 64 BPM | TEMPERATURE: 97.7 F | HEIGHT: 62 IN | BODY MASS INDEX: 19.84 KG/M2 | DIASTOLIC BLOOD PRESSURE: 70 MMHG | OXYGEN SATURATION: 93 % | RESPIRATION RATE: 17 BRPM

## 2024-03-26 DIAGNOSIS — M81.0 OSTEOPOROSIS, UNSPECIFIED OSTEOPOROSIS TYPE, UNSPECIFIED PATHOLOGICAL FRACTURE PRESENCE: ICD-10-CM

## 2024-03-26 DIAGNOSIS — Z17.0 MALIGNANT NEOPLASM OF UPPER-OUTER QUADRANT OF RIGHT BREAST IN FEMALE, ESTROGEN RECEPTOR POSITIVE (MULTI): Primary | ICD-10-CM

## 2024-03-26 DIAGNOSIS — C50.411 MALIGNANT NEOPLASM OF UPPER-OUTER QUADRANT OF RIGHT BREAST IN FEMALE, ESTROGEN RECEPTOR POSITIVE (MULTI): Primary | ICD-10-CM

## 2024-03-26 PROCEDURE — 3078F DIAST BP <80 MM HG: CPT | Performed by: INTERNAL MEDICINE

## 2024-03-26 PROCEDURE — 1157F ADVNC CARE PLAN IN RCRD: CPT | Performed by: INTERNAL MEDICINE

## 2024-03-26 PROCEDURE — 1160F RVW MEDS BY RX/DR IN RCRD: CPT | Performed by: INTERNAL MEDICINE

## 2024-03-26 PROCEDURE — 99214 OFFICE O/P EST MOD 30 MIN: CPT | Performed by: INTERNAL MEDICINE

## 2024-03-26 PROCEDURE — 1159F MED LIST DOCD IN RCRD: CPT | Performed by: INTERNAL MEDICINE

## 2024-03-26 PROCEDURE — 1126F AMNT PAIN NOTED NONE PRSNT: CPT | Performed by: INTERNAL MEDICINE

## 2024-03-26 PROCEDURE — 3075F SYST BP GE 130 - 139MM HG: CPT | Performed by: INTERNAL MEDICINE

## 2024-03-26 PROCEDURE — 1036F TOBACCO NON-USER: CPT | Performed by: INTERNAL MEDICINE

## 2024-03-26 RX ORDER — METOPROLOL SUCCINATE 200 MG/1
200 TABLET, EXTENDED RELEASE ORAL DAILY
Qty: 90 TABLET | Refills: 1 | Status: SHIPPED | OUTPATIENT
Start: 2024-03-26

## 2024-03-26 RX ORDER — TAMOXIFEN CITRATE 20 MG/1
20 TABLET ORAL DAILY
Qty: 90 TABLET | Refills: 2 | Status: SHIPPED | OUTPATIENT
Start: 2024-03-26

## 2024-03-26 ASSESSMENT — COLUMBIA-SUICIDE SEVERITY RATING SCALE - C-SSRS
2. HAVE YOU ACTUALLY HAD ANY THOUGHTS OF KILLING YOURSELF?: NO
6. HAVE YOU EVER DONE ANYTHING, STARTED TO DO ANYTHING, OR PREPARED TO DO ANYTHING TO END YOUR LIFE?: NO
1. IN THE PAST MONTH, HAVE YOU WISHED YOU WERE DEAD OR WISHED YOU COULD GO TO SLEEP AND NOT WAKE UP?: NO

## 2024-03-26 ASSESSMENT — PAIN SCALES - GENERAL: PAINLEVEL: 0-NO PAIN

## 2024-03-26 ASSESSMENT — PATIENT HEALTH QUESTIONNAIRE - PHQ9
1. LITTLE INTEREST OR PLEASURE IN DOING THINGS: NOT AT ALL
SUM OF ALL RESPONSES TO PHQ9 QUESTIONS 1 AND 2: 0
2. FEELING DOWN, DEPRESSED OR HOPELESS: NOT AT ALL

## 2024-03-26 NOTE — PATIENT INSTRUCTIONS
Today you met with your hematologist/oncologist.  Recent labs were discussed and questions answered.  Scheduling orders were placed.  While we appreciate that you verbalized understanding, if any questions arise after leaving, please do not hesitate to call the office to discuss.  [phone] Lyly Pinto  A physician assistant will see you in 3 months after your mammogram and ultrasound to review your results. Please arrive 15 minutes early to that appointment to have labs drawn.

## 2024-03-26 NOTE — PROGRESS NOTES
Patient ID: Ramirez Dobbins is a 89 y.o. female.  Referring Physician: Luis Umaña MD  [id number]  [place] [id number]  Primary Care Provider: Jose Schafer DO      Subjective    HPI    Chief Complaint: evaluation for breast cancer   Interval History:    Referred by Dr. Rosas      Reason for referral: breast cancer      HPI     89 year old woman with hx of A. fib on Eliquis, COPD, GERD, osteoporosis, hypertension, vit D deficiency, left bipolar hip hemiarthroplasty  who presented to the hospital in June 2022 after mechanical ground-level fall with left hip/groin pain.  CT scans of spine and C/A/P on Jun 6th, 2022 showed Acute comminuted left pelvic fracture extending to the left acetabulum,  anterior column and left iliac fossa. However it showed a somewhat speculated mass in the right breast measuring 1.2 x 2.1 x 1.8 cm. she had PT/OT and no immediate ortho interventions.   She then had work up for the breast mass in May 2023      -5/18/23 US and diagnostic MMG of the b/l breast: right breast mass at 12 o’clock position measuring 2.6 x 2.0 x 1.7 cm in size, no pathologically enlarged LN   -bx on 5/18/23 showed IDC grade 2, ER positive (>95%), HI positive (>95%) and Her2 negative (1+ IHC)   -seen with Dr. Rosas   -CT scans of C/A/P 6/7/23: no metastatic disease, mass measuring 2.6 x 1,6 cm   -CT scans in ER on 8/21/23 of C/T/L spine and C/A/P following another fall: mild compression deformity of T10 spine, right breast mass         she does not want to have surgery   she has pain in the back from her fall and   she is using tylenol and lidocaine patches   it is improved with movement   able to ambulate with her walker   she is on calcium-vit D   she tried alendronate in the past   she knew she had a breast   she can feel the breast lump which is enlarging per patient and she thinks she has some pain in the left breast   she used heating pad to improve, it is intermittent and  worsened , she does not feel the lump   not eating well since the fall   prior to that she was pretty active   no nausea or vomiting     she has started Tamoxifen 8/30 with good tolerance     interval history - 3/26/24      No new complaints or issues   She continues on Tamoxifen   She continues to tolerate well   No new falls   He had injection to her SI joint which seemed to help     Denies abd pain, nausea or vomiting   Eating and drinking well   No fever, chills or infections   Breast mass similar   Walks with walker  No hot flashes   No LE swelling or pain     PAST MEDICAL HISTORY:   A. fib on Eliquis, COPD, GERD, osteoporosis, hypertension, vit D deficiency, left bipolar hip hemiarthroplasty who presented to the hospital  in June 2022 after mechanical ground-level fall with left hip/groin pain.  hysterectomy   cholecystostomy      SOCIAL HISTORY:   former smoker, quit 20 yrs ago   no alcohol   she owned ice cream selling business   she has 3 children      FAMILY HISTORY:    sister had breast cancer, she was not at a young age   No other specific history of bleeding, clotting or malignant disorder in the family.     REVIEW OF SYSTEMS:  Pertinent finding as per the history above.  There are no additional specific symptoms pertaining to eyes, ENT, hematologic, lymphatic, neurological, psychiatric, cardiac,  pulmonary, GI, , endocrine, rheumatic, dermatological, or musculoskeletal systems.  All other systems have been reviewed and generally negative and noncontributory.     PHYSICAL EXAMINATION:    GENERAL:  Age-appropriate, in no acute discomfort.    VITAL SIGNS:  Reviewed in the EMR  HEENT:  Normocephalic and atraumatic.  Mucous membranes are moist. No oral lesions.    NECK:  Supple without lymphadenopathy.  No thyromegaly or bruits.    CHEST:  Clear to auscultation bilaterally.    HEART:  irregular,  No gallop, rub, or murmur.    ABDOMEN:  Soft, nontender, and nondistended. No hepatosplenomegaly.   "  EXTREMITIES:  No cyanosis, clubbing, or edema.  NEUROLOGICAL:  Alert, awake, and oriented.  No gross focal deficit.  LYMPHATICS: No significant lymphadenopathy.  BREAST: right breast mass in upper outer quadrant around 3.5 x 3.5 cm with dimpling of skin and retraction of nipple, seemed softer on exam today, but similar measurements      LAB DATA:  Latest labs were reviewed in the EMR and from the outside sources.        Objective   BSA: 1.46 meters squared  /70 (BP Location: Left arm, Patient Position: Sitting, BP Cuff Size: Small adult)   Pulse 64   Temp 36.5 °C (97.7 °F) (Temporal)   Resp 17   Ht (S) 1.569 m (5' 1.77\")   Wt 48.9 kg (107 lb 12.9 oz)   SpO2 93%   BMI 19.86 kg/m²     Wt Readings from Last 3 Encounters:   03/26/24 48.9 kg (107 lb 12.9 oz)   03/22/24 47.6 kg (105 lb)   03/08/24 47.6 kg (105 lb)   ]  Family History   Problem Relation Name Age of Onset    Diabetes Father       Oncology History    No history exists.       Ramirez Dobbins  reports that she has quit smoking. Her smoking use included cigarettes. She has been exposed to tobacco smoke. She has never used smokeless tobacco.  She  reports that she does not currently use alcohol.  She  reports no history of drug use.    Physical Exam    Performance Status:  Symptomatic; in bed <50% of the day    Assessment/Plan      1. IDC of right breast      ER positive, NV positive, Her-2 negative   [id number]   patient does not want to proceed with surgery   we discussed endocrine therapy today, discussed that this is not for curative purposes, she understands that   options for Tamoxifen and anastrozole discussed, pros and cons discussed   since she has already osteoporosis and hx of falls including recent one with compression fx , will give Tamoxifen   start Esparza 20 mg a day  she has hx of hysterectomy and she is already on Eliquis      9/27- continue Tamoxifen tolerated at this time   we will restage with US in 3 months, has not noticed any " clinical changes thus far     12/27- the mass is stable in size on repeat imaging   Will continue to monitor radiographically and clinically   Continue tamoxifen for now , tolerates well     3/26- tolerates Tamoxifen 20 mg a day, mass similar in size in general   No emerging side effects   Continue with same, repeat imaging in 3-4 m     2. Osteoporosis   will obtain dexa scan   on Ca-vit D   did not tolerate Fosamax per daughter      9/27- dexa scheduled next month   12/27- dexa done Oct 2023 showed mild osteoporosis, but a compression fx, finished previously 5 yrs of fosamax, c/w Ca-vit D   Pain in back improving progressively   3/26- s/p injection to SI joint with improvement      RTC 3 m with US/MMG        Luis Umaña MD

## 2024-03-29 ENCOUNTER — TREATMENT (OUTPATIENT)
Dept: PHYSICAL THERAPY | Facility: CLINIC | Age: 89
End: 2024-03-29
Payer: MEDICARE

## 2024-03-29 DIAGNOSIS — M54.9 BACK PAIN: ICD-10-CM

## 2024-03-29 DIAGNOSIS — M47.818 SI JOINT ARTHRITIS: ICD-10-CM

## 2024-03-29 PROCEDURE — 97530 THERAPEUTIC ACTIVITIES: CPT | Mod: GP,KX | Performed by: PHYSICAL THERAPIST

## 2024-03-29 NOTE — PROGRESS NOTES
Physical Therapy    Physical Therapy Treatment/Re-check    Patient Name: Ramirez Dobbins  MRN: [id number]  Today's Date: 3/29/2024  Time Calculation  Start Time: 1331  Stop Time: 1418  Time Calculation (min): 47 min  Onset Date:12/15/23  Insurance: MEDICARE A&B, MMO SUPP, MED NEC, NO AUTH   Visit Number: 10  Start/Stop Time:  13:    Assessment: The patient has completed 10 visits of PT, focusing on increasing trunk and LE strength.  She recently received trigger point injections with PMR and reports good pain reduction.  She has demonstrated very good progress toward her goals, demonstrating improved posture, reporting reduction of back pain, improved VIDHI score.  She continues to demonstrate decreased strength in hip musculature dependence on walker and the patient would like to be able to progress to a cane, even for short distances.  She demonstrated decreased static standing balance today during the assessment.  She would benefit from a Caruso balance  versus TUG assessment to further quantify her fall risk.  Her goals of improving balance and gait are yet to be addressed and therapist is recommending continued therapy for 2x per week for 3-4 weeks. New Goals were added today based on objective measures.      Plan:  Treatment/Interventions: Cryotherapy, Education/ Instruction, Gait training, Neuromuscular re-education, Therapeutic activities, Therapeutic exercises, Hot pack  PT Plan: Skilled PT  PT Frequency: 2 times per week  Duration: 3-4 weeks  Onset Date: 12/15/23  Certification Period Start Date: 3/29/24  Certification Period End Date: 6/27/24  Rehab Potential: Good  Plan of Care Agreement: Patient       Current Problem  1. SI joint arthritis  Follow Up In Physical Therapy    Follow Up In Physical Therapy      2. Back pain  Follow Up In Physical Therapy    Follow Up In Physical Therapy          General          Subjective    Patient reports that she had a polyp om her ear that was blocking her hearing.  It  "was removed and her hearing is now resolved. She also had injections from Dr. Temple that have been quite helpful and they have resolved the constant pain in her lower back.  She continues to have sharp shooting pains that do not last when she moves first thing in the morning. Doing HEP.  Patient reports 25% reduction of pain.  She feels she still cannot lift anything due to pain. She admits that she is \"counter-surfing.\"    Precautions: Breast CA, monitoring radiographically, Falls, left NESS, hx of pelvic fracture, osteoporosis, Xray showing loss of height at T11, STEADi 9         Pain: 0/10       Objective      Outcome Measures:  VIDHI: 14/50 = 28%    Treatments:  Therapeutic Exercise:  Recumbent stepper seat #10 level 1.0 x 8 minutes    Re-assessment measures, VIDHI completion, goals status and PT POC discussed 39 minutes    Posture: Patient is able to achieve neutral/upright posture in sagittal plane, Left lateral trunk flexion in frontal plane due to scoliotic curve    LE/Lumbopelvic Strength:  Hip Flex: R:5 L4+  Knee Ext: R:4+ L:4+  Ankle DF: R:5 L:5  Bilateral Bridge: 100%, no pain   Transverse Abdominis/Sahrmann Level: level 2  Hip Abduction: R:4 L:4  Knee Flexion: R:    5           L:5    Tandem R in front: 10\" Left in front: 17\"   SLS: R: 10, L: 3 seconds  Airex: normal stance: Eo WNL, EC 10\"  Airex: romberg: EO: 30\", EC: 4 seconds      OP EDUCATION: Continue with current HEP.       Goals:  Active       Back Pain       Patient will be independent and adherent to HEP to enhance functional progress and long term management of condition.  (Met)       Start:  01/26/24    Expected End:  03/08/24    Resolved:  03/29/24         Patient will improved Modified Oswestry score by 13 points indicating meaningful clinical change in function due to reduction of back pain. (Met)       Start:  01/26/24    Expected End:  03/08/24    Resolved:  03/29/24         Patient will report reduction of back pain by 50% or greater to " "ease performance of functional mobility and IADLs (Progressing)       Start:  01/26/24    Expected End:  04/26/24            Patient will increase LE and trunk strength by 1/3 MMT grade to facilitate muscle performance necessary for maintaining symmetrical postural alignement and functional mobility. (Progressing)       Start:  01/26/24    Expected End:  04/26/24            Patient will ambulate household distances with cane with mod I with no evidence of LOB or instability to improve independence in her home.  (Not Progressing)       Start:  01/26/24    Expected End:  04/26/24            Patient will demonstrate Good static and dynamic standing balance.  (Progressing)       Start:  01/26/24    Expected End:  04/26/24               Back Pain       Patient will have a Caruso versus TUG assessment and goal established.       Start:  03/29/24    Expected End:  04/26/24            Patient will be able to maintain tandem stance for 30\" indicating improved static balance to reduce fall risk.        Start:  03/29/24    Expected End:  04/26/24              "

## 2024-03-29 NOTE — LETTER
March 29, 2024    [place]  [address] [address]   [facility]  [facility] [address]    Patient: Ramirez Dobbins   YOB: 1933   Date of Visit: 3/29/2024       Dear Aguila Mullins MD  [address] [address]    The attached plan of care is being sent to you because your patient’s medical reimbursement requires that you certify the plan of care. Your signature is required to allow uninterrupted insurance coverage.      You may indicate your approval by signing below and faxing this form back to us at Dept Fax: [phone].    Please call Dept: [phone] with any questions or concerns.    Thank you for this referral,        Michelle Kapoor, PT  [facility] [address] [facility]  [address] [facility] [address]    Payer: Payor: MEDICARE / Plan: MEDICARE PART A AND B / Product Type: *No Product type* /                                                                         Date:     Dear Michelle Kapoor, PT,     Re: Ms. Ramirez Dobbins, MRN:[id number]    I certify that I have reviewed the attached plan of care and it is medically necessary for Ms. Ramirez Dobbins (06/10/1933) who is under my care.          ______________________________________                    _________________  Provider name and credentials                                           Date and time                                                                                           Plan of Care 3/29/24   Effective from: 3/29/2024  Effective to: 6/27/2024    Plan ID: [id number]            Participants as of Finalize on 3/29/2024    Name Type Comments Contact Info    Aguila Mullins MD Referring Provider  [phone]    Michelle Kapoor PT Physical Therapist  [phone]       Last Plan Note     Author: Michelle Kapoor PT Status: Incomplete Last edited: 3/29/2024  1:30 PM       Physical Therapy    Physical Therapy Treatment/Re-check    Patient Name: Ramirez MONTERO  Mu  MRN: [id number]  Today's Date: 3/29/2024  Time Calculation  Start Time: 1331  Stop Time: 1418  Time Calculation (min): 47 min  Onset Date:12/15/23  Insurance: MEDICARE A&B, MMO SUPP, MED NEC, NO AUTH   Visit Number: 10  Start/Stop Time:  13:    Assessment: The patient has completed 10 visits of PT, focusing on increasing trunk and LE strength.  She recently received trigger point injections with PMR and reports good pain reduction.  She has demonstrated very good progress toward her goals, demonstrating improved posture, reporting reduction of back pain, improved VIDHI score.  She continues to demonstrate decreased strength in hip musculature dependence on walker and the patient would like to be able to progress to a cane, even for short distances.  She demonstrated decreased static standing balance today during the assessment.  She would benefit from a Caruso balance  versus TUG assessment to further quantify her fall risk.  Her goals of improving balance and gait are yet to be addressed and therapist is recommending continued therapy for 2x per week for 3-4 weeks. New Goals were added today based on objective measures.      Plan:  Treatment/Interventions: Cryotherapy, Education/ Instruction, Gait training, Neuromuscular re-education, Therapeutic activities, Therapeutic exercises, Hot pack  PT Plan: Skilled PT  PT Frequency: 2 times per week  Duration: 3-4 weeks  Onset Date: 12/15/23  Certification Period Start Date: 3/29/24  Certification Period End Date: 6/27/24  Rehab Potential: Good  Plan of Care Agreement: Patient       Current Problem  1. SI joint arthritis  Follow Up In Physical Therapy    Follow Up In Physical Therapy      2. Back pain  Follow Up In Physical Therapy    Follow Up In Physical Therapy          General          Subjective   Patient reports that she had a polyp om her ear that was blocking her hearing.  It was removed and her hearing is now resolved. She also had injections from   "[facility] that have been quite helpful and they have resolved the constant pain in her lower back.  She continues to have sharp shooting pains that do not last when she moves first thing in the morning. Doing HEP.  Patient reports 25% reduction of pain.  She feels she still cannot lift anything due to pain. She admits that she is \"counter-surfing.\"    Precautions: Breast CA, monitoring radiographically, Falls, left NESS, hx of pelvic fracture, osteoporosis, Xray showing loss of height at T11, STEADi 9         Pain: 0/10       Objective     Outcome Measures:  VIDHI: 14/50 = 28%    Treatments:  Therapeutic Exercise:  Recumbent stepper seat #10 level 1.0 x 8 minutes    Re-assessment measures, VIDHI completion, goals status and PT POC discussed 39 minutes    Posture: Patient is able to achieve neutral/upright posture in sagittal plane, Left lateral trunk flexion in frontal plane due to scoliotic curve    LE/Lumbopelvic Strength:  Hip Flex: R:5 L4+  Knee Ext: R:4+ L:4+  Ankle DF: R:5 L:5  Bilateral Bridge: 100%, no pain   Transverse Abdominis/Sahrmann Level: level 2  Hip Abduction: R:4 L:4  Knee Flexion: R:    5           L:5    Tandem R in front: 10\" Left in front: 17\"   SLS: R: 10, L: 3 seconds  Airex: normal stance: Eo WNL, EC 10\"  Airex: romberg: EO: 30\", EC: 4 seconds      OP EDUCATION: Continue with current HEP.       Goals:  Active       Back Pain       Patient will be independent and adherent to HEP to enhance functional progress and long term management of condition.  (Met)       Start:  01/26/24    Expected End:  03/08/24    Resolved:  03/29/24         Patient will improved Modified Oswestry score by 13 points indicating meaningful clinical change in function due to reduction of back pain. (Met)       Start:  01/26/24    Expected End:  03/08/24    Resolved:  03/29/24         Patient will report reduction of back pain by 50% or greater to ease performance of functional mobility and IADLs (Progressing)       Start:  " "01/26/24    Expected End:  04/26/24            Patient will increase LE and trunk strength by 1/3 MMT grade to facilitate muscle performance necessary for maintaining symmetrical postural alignement and functional mobility. (Progressing)       Start:  01/26/24    Expected End:  04/26/24            Patient will ambulate household distances with cane with mod I with no evidence of LOB or instability to improve independence in her home.  (Not Progressing)       Start:  01/26/24    Expected End:  04/26/24            Patient will demonstrate Good static and dynamic standing balance.  (Progressing)       Start:  01/26/24    Expected End:  04/26/24               Back Pain       Patient will have a Caruso versus TUG assessment and goal established.       Start:  03/29/24    Expected End:  04/26/24            Patient will be able to maintain tandem stance for 30\" indicating improved static balance to reduce fall risk.        Start:  03/29/24    Expected End:  04/26/24                   Current Participants as of 3/29/2024    Name Type Comments Contact Info    Aguila Mullins MD Referring Provider  [phone]    Signature pending    Michelle Kapoor PT Physical Therapist  [phone]    Signature pending      "

## 2024-04-02 ENCOUNTER — TREATMENT (OUTPATIENT)
Dept: PHYSICAL THERAPY | Facility: CLINIC | Age: 89
End: 2024-04-02
Payer: MEDICARE

## 2024-04-02 DIAGNOSIS — M47.818 SI JOINT ARTHRITIS: ICD-10-CM

## 2024-04-02 DIAGNOSIS — M54.9 BACK PAIN: ICD-10-CM

## 2024-04-02 PROCEDURE — 97110 THERAPEUTIC EXERCISES: CPT | Mod: KX,GP | Performed by: PHYSICAL THERAPIST

## 2024-04-02 NOTE — PROGRESS NOTES
"Physical Therapy    Physical Therapy Treatment    Patient Name: Ramirez Dobbins  MRN: [id number]  Today's Date: 4/2/2024  Time Calculation  Start Time: 1730  Stop Time: 1814  Time Calculation (min): 44 min      Current Problem  1. SI joint arthritis  Follow Up In Physical Therapy      2. Back pain  Follow Up In Physical Therapy          Goals:  Physical Therapy - Physical Therapy - August 2023 Problems       Physical Therapy - Physical Therapy - August 2023 Problems (Resolved)       Balance       Pt will reduce TUG score by at least 3 sec to decrease the risk of falls. (Met)       Start:  10/03/23    Expected End:  11/13/23    Resolved:  10/23/23            PT Problem       Pt will improve Romberg score by 2/6 score (Met)       Start:  10/03/23    Expected End:  11/13/23    Resolved:  10/23/23         Pt will improve gait mechanics evidenced by equal step length, increased WBing LLE, and LRAD in order to participate in ADL, IADL, work, and leisure skills   (Met)       Start:  10/03/23    Expected End:  11/13/23    Resolved:  10/23/23         Pt will increase strength of BLE musculature by at least 1 mm grade to participate in ADL, IADL, work, and leisure skills   (Not Progressing)       Start:  10/03/23    Expected End:  11/13/23    Resolved:  11/29/23         Pt will improve \"AMPAC\" score to at least 30 for increased independence and ease with ADL/IADL's, skills, and work/leisure activities.   (Not Progressing)       Start:  10/03/23    Expected End:  11/13/23    Resolved:  11/29/23      Goal Note       NOT MET               Pt will improve stair negotiation by use of a reciprocal pattern with single use of HR to be able to negotiate different levels of home   (Met)       Start:  10/03/23    Expected End:  11/13/23    Resolved:  10/23/23         Pt will ambulate short household sitances ~50' with use of straight cane without LOB, mod I, equal WBing through LE's, and proper aurea to improve independence of mobility " "within home (Not Progressing)       Start:  10/23/23    Expected End:  12/18/23    Resolved:  11/29/23                  Physical Therapy 1/24/24 Problems       Physical Therapy 1/24/24 Problems (Active)       Back Pain       Patient will be independent and adherent to HEP to enhance functional progress and long term management of condition.  (Met)       Start:  01/26/24    Expected End:  03/08/24    Resolved:  03/29/24         Patient will improved Modified Oswestry score by 13 points indicating meaningful clinical change in function due to reduction of back pain. (Met)       Start:  01/26/24    Expected End:  03/08/24    Resolved:  03/29/24         Patient will report reduction of back pain by 50% or greater to ease performance of functional mobility and IADLs (Progressing)       Start:  01/26/24    Expected End:  04/26/24            Patient will increase LE and trunk strength by 1/3 MMT grade to facilitate muscle performance necessary for maintaining symmetrical postural alignement and functional mobility. (Progressing)       Start:  01/26/24    Expected End:  04/26/24            Patient will ambulate household distances with cane with mod I with no evidence of LOB or instability to improve independence in her home.  (Not Progressing)       Start:  01/26/24    Expected End:  04/26/24            Patient will demonstrate Good static and dynamic standing balance.  (Progressing)       Start:  01/26/24    Expected End:  04/26/24               Back Pain       Patient will have a Caruso versus TUG assessment and goal established.       Start:  03/29/24    Expected End:  04/26/24            Patient will be able to maintain tandem stance for 30\" indicating improved static balance to reduce fall risk.        Start:  03/29/24    Expected End:  04/26/24                   General  Reason for Referral: SI joint arthritis  Referred By: Dr. Mullins  Past Medical History Relevant to Rehab: Fall with break in pelvis, fall with " "fracture of T10 (per patient), Breast CA, remote left NESS    Assessment:   Increased focus on standing and balance exercise today.  Patient demonstrated good technique with exercises requiring minimal verbal cueing.  Patient noted no increased pain at end of session.         Plan:  Continue per PT POC        Subjective    Pt reports 0/10 pain today.  No new falls since last PT visit.  She reports that she had injections from Dr. Temple that have been helpful to decrease her pain however, she feels that the intensity of her LBP has been gradually increasing.  She reports compliance with HEP.  She arrives to appointment using rollator.    Precautions: Breast CA, monitoring radiographically, Falls, left NESS, hx of pelvic fracture, osteoporosis, Xray showing loss of height at T11, STEADi 9      Pain:   0/10     Objective   Treatments:  Therapeutic Exercise: (40 minutes)  Recumbent stepper seat #10 level 1.0 x 8 minutes     Standing in parallel bars with 1-2 UE support:  March forward x 2 laps in parallel bars   Backward walking   Zig zag walk in parallel bars x 2 laps each way (forward /backward, left to right)  -B heel/toe raises with BUE support 2 x 10 reps   -Side stepping in // bars x 2 laps no UE support; SBA  MIP on gray foam x 20 L/R  Static standing while reaching left to right with ball x 15 L/R  Static standing while reaching forward with PVC x 15  Hip abduction/SLR x 15 L/R  Scapular retraction x 20 bilateral    Holding onto walker with 2 UEs  -Hip extension press into stability ball maintaining upright posture with UE Assist from walker 2  x 10, 5\" hold R/L    Standing with walker in front of patient and with close SBA:  -Standing B long arm extension with red tubing 2 x 10     Seated in chair with back support:   -Backward shoulder rolls 2 x 10 and B shoulder shrugs 2 x 10 reps, holding posture in pelvic in neutral; verbal cueing for technique       Not completed today 4-2-2024:  Seated:  -Postural " "correction with APT <> neutral  x 10 reps in neutral trunk positioning   -Scapular retraction 2 x 10 bilateral, holding posture in pelvic neutral   -B shoulder flexion with PVC wand to about  deg 2 x 10 reps    Hooklying with wedge and single pillow:   -Bekah Yessi B shoulder press with scapular retraction and arm press 5\" hold 2 x 10 reps   -Bekah Yessi R/L leg press into the mat 5\" hold  2 x 10 reps  -B glute set in supine 2 x 10 reps, 5 sec hold   -B shoulder horizontal ABD 2 x 10 reps (no theraband)  -Hooklying B hip adduction ball squeeze 2 x 10 reps, 5 sec    -Alt arm lifts 2 x 10 reps each R/L         OP EDUCATION: Continue with current HEP.       "
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
## 2024-04-11 NOTE — PATIENT INSTRUCTIONS
- generic diclofenac patch script provided  -Continue Tylenol and Robaxin as needed  -Try magnesium 250-500 mg daily for muscle cramps and drink plenty of water  -Consider other medications in the future  -Continue physical therapy, get some home exercises and start doing them daily or every other day as well  -Consider injections: repeat trigger point injections, ultrasound-guided SI joint injections, referral for spine injections  -Consider lumbar MRI in the future  -Follow-up next available US guided left SI joint injection

## 2024-04-12 ENCOUNTER — OFFICE VISIT (OUTPATIENT)
Dept: PHYSICAL MEDICINE AND REHAB | Facility: CLINIC | Age: 89
End: 2024-04-12
Payer: MEDICARE

## 2024-04-12 VITALS
HEIGHT: 62 IN | TEMPERATURE: 96.8 F | SYSTOLIC BLOOD PRESSURE: 150 MMHG | BODY MASS INDEX: 20.06 KG/M2 | OXYGEN SATURATION: 92 % | WEIGHT: 109 LBS | DIASTOLIC BLOOD PRESSURE: 64 MMHG

## 2024-04-12 DIAGNOSIS — M47.818 SI JOINT ARTHRITIS: ICD-10-CM

## 2024-04-12 DIAGNOSIS — M48.061 SPINAL STENOSIS OF LUMBAR REGION, UNSPECIFIED WHETHER NEUROGENIC CLAUDICATION PRESENT: ICD-10-CM

## 2024-04-12 DIAGNOSIS — M79.18 MYOFASCIAL PAIN: ICD-10-CM

## 2024-04-12 DIAGNOSIS — M41.9 SCOLIOSIS, UNSPECIFIED SCOLIOSIS TYPE, UNSPECIFIED SPINAL REGION: ICD-10-CM

## 2024-04-12 DIAGNOSIS — S39.012D STRAIN OF LUMBAR REGION, SUBSEQUENT ENCOUNTER: ICD-10-CM

## 2024-04-12 DIAGNOSIS — M54.16 LUMBAR RADICULOPATHY: Primary | ICD-10-CM

## 2024-04-12 PROCEDURE — 1160F RVW MEDS BY RX/DR IN RCRD: CPT | Performed by: PHYSICAL MEDICINE & REHABILITATION

## 2024-04-12 PROCEDURE — 1157F ADVNC CARE PLAN IN RCRD: CPT | Performed by: PHYSICAL MEDICINE & REHABILITATION

## 2024-04-12 PROCEDURE — 1159F MED LIST DOCD IN RCRD: CPT | Performed by: PHYSICAL MEDICINE & REHABILITATION

## 2024-04-12 PROCEDURE — 1126F AMNT PAIN NOTED NONE PRSNT: CPT | Performed by: PHYSICAL MEDICINE & REHABILITATION

## 2024-04-12 PROCEDURE — 3077F SYST BP >= 140 MM HG: CPT | Performed by: PHYSICAL MEDICINE & REHABILITATION

## 2024-04-12 PROCEDURE — 99213 OFFICE O/P EST LOW 20 MIN: CPT | Performed by: PHYSICAL MEDICINE & REHABILITATION

## 2024-04-12 PROCEDURE — 3078F DIAST BP <80 MM HG: CPT | Performed by: PHYSICAL MEDICINE & REHABILITATION

## 2024-04-12 ASSESSMENT — PAIN SCALES - GENERAL: PAINLEVEL: 0-NO PAIN

## 2024-04-15 ENCOUNTER — OFFICE VISIT (OUTPATIENT)
Dept: PRIMARY CARE | Facility: CLINIC | Age: 89
End: 2024-04-15
Payer: MEDICARE

## 2024-04-15 VITALS
HEART RATE: 70 BPM | BODY MASS INDEX: 20.58 KG/M2 | WEIGHT: 109 LBS | HEIGHT: 61 IN | DIASTOLIC BLOOD PRESSURE: 62 MMHG | SYSTOLIC BLOOD PRESSURE: 116 MMHG | OXYGEN SATURATION: 92 %

## 2024-04-15 DIAGNOSIS — J20.9 ACUTE BRONCHITIS, UNSPECIFIED ORGANISM: Primary | ICD-10-CM

## 2024-04-15 PROCEDURE — 1036F TOBACCO NON-USER: CPT | Performed by: FAMILY MEDICINE

## 2024-04-15 PROCEDURE — G2211 COMPLEX E/M VISIT ADD ON: HCPCS | Performed by: FAMILY MEDICINE

## 2024-04-15 PROCEDURE — 3074F SYST BP LT 130 MM HG: CPT | Performed by: FAMILY MEDICINE

## 2024-04-15 PROCEDURE — 3078F DIAST BP <80 MM HG: CPT | Performed by: FAMILY MEDICINE

## 2024-04-15 PROCEDURE — 99213 OFFICE O/P EST LOW 20 MIN: CPT | Performed by: FAMILY MEDICINE

## 2024-04-15 PROCEDURE — 1160F RVW MEDS BY RX/DR IN RCRD: CPT | Performed by: FAMILY MEDICINE

## 2024-04-15 PROCEDURE — 1157F ADVNC CARE PLAN IN RCRD: CPT | Performed by: FAMILY MEDICINE

## 2024-04-15 PROCEDURE — 1159F MED LIST DOCD IN RCRD: CPT | Performed by: FAMILY MEDICINE

## 2024-04-15 PROCEDURE — 1125F AMNT PAIN NOTED PAIN PRSNT: CPT | Performed by: FAMILY MEDICINE

## 2024-04-15 RX ORDER — CEFDINIR 300 MG/1
300 CAPSULE ORAL 2 TIMES DAILY
Qty: 14 CAPSULE | Refills: 0 | Status: SHIPPED | OUTPATIENT
Start: 2024-04-15 | End: 2024-04-22

## 2024-04-15 ASSESSMENT — COLUMBIA-SUICIDE SEVERITY RATING SCALE - C-SSRS
1. IN THE PAST MONTH, HAVE YOU WISHED YOU WERE DEAD OR WISHED YOU COULD GO TO SLEEP AND NOT WAKE UP?: NO
6. HAVE YOU EVER DONE ANYTHING, STARTED TO DO ANYTHING, OR PREPARED TO DO ANYTHING TO END YOUR LIFE?: NO
2. HAVE YOU ACTUALLY HAD ANY THOUGHTS OF KILLING YOURSELF?: NO

## 2024-04-15 ASSESSMENT — PAIN SCALES - GENERAL: PAINLEVEL: 5

## 2024-04-15 NOTE — PROGRESS NOTES
"Subjective   Patient ID: Ramirez Dobbins is a 89 y.o. female who presents for chest congestion (Sneezing, runny nose x 1 week).    URI/Sinusitis/Bronchitis:  -Sx's:  Chest congestion, sneezing, coughing.  Runny nose.  No fevers or chills.  Cough feels down deep.  Sore throat improved.  Not sleeping.  Coughing after eating.  Started after eating.  No reflux.  More short of breath.    -Duration: 1 week ago  -Treatment: Mucinex DM         Review of Systems    Objective   /62 (BP Location: Right arm, Patient Position: Sitting, BP Cuff Size: Small adult)   Pulse 70   Ht 1.549 m (5' 1\")   Wt 49.4 kg (109 lb)   SpO2 92%   BMI 20.60 kg/m²     Physical Exam  Vitals reviewed.   Constitutional:       General: She is not in acute distress.  HENT:      Right Ear: Tympanic membrane and ear canal normal.      Left Ear: Ear canal normal.  No middle ear effusion. Tympanic membrane is erythematous.      Nose: Rhinorrhea present.      Right Turbinates: Swollen.      Left Turbinates: Swollen.   Cardiovascular:      Rate and Rhythm: Normal rate and regular rhythm.   Pulmonary:      Effort: Pulmonary effort is normal.      Breath sounds: No wheezing or rhonchi.   Musculoskeletal:      Right lower leg: No edema.      Left lower leg: No edema.      Comments: Using rollator; Tender lower thoracic midline and bilateral SI joints   Lymphadenopathy:      Cervical: No cervical adenopathy.   Neurological:      Mental Status: She is alert.   Psychiatric:         Mood and Affect: Mood normal.         Behavior: Behavior normal.       Assessment/Plan   Diagnoses and all orders for this visit:  Acute bronchitis, unspecified organism  -     cefdinir (Omnicef) 300 mg capsule; Take 1 capsule (300 mg) by mouth 2 times a day for 7 days.         Patient Instructions   Here for bronchitis - no wheezing.  ?COPD flare up vs underlying aspiration.  Recommend omnicef twice a day.  Continue trelegy and albuterol.  If having symptoms of aspiration - " recommend swallow evaluation.  Call if issues.

## 2024-04-15 NOTE — PATIENT INSTRUCTIONS
Here for bronchitis - no wheezing.  ?COPD flare up vs underlying aspiration.  Recommend omnicef twice a day.  Continue trelegy and albuterol.  If having symptoms of aspiration - recommend swallow evaluation.  Call if issues.

## 2024-04-16 ENCOUNTER — OFFICE VISIT (OUTPATIENT)
Dept: OTOLARYNGOLOGY | Facility: CLINIC | Age: 89
End: 2024-04-16
Payer: MEDICARE

## 2024-04-16 ENCOUNTER — CLINICAL SUPPORT (OUTPATIENT)
Dept: AUDIOLOGY | Facility: CLINIC | Age: 89
End: 2024-04-16
Payer: MEDICARE

## 2024-04-16 VITALS — WEIGHT: 105 LBS | BODY MASS INDEX: 19.83 KG/M2 | HEIGHT: 61 IN

## 2024-04-16 DIAGNOSIS — H74.42 POLYP OF LEFT MIDDLE EAR: Primary | ICD-10-CM

## 2024-04-16 DIAGNOSIS — H90.3 BILATERAL SENSORINEURAL HEARING LOSS: ICD-10-CM

## 2024-04-16 DIAGNOSIS — H90.A32 MIXED CONDUCTIVE AND SENSORINEURAL HEARING LOSS OF LEFT EAR WITH RESTRICTED HEARING OF RIGHT EAR: Primary | ICD-10-CM

## 2024-04-16 DIAGNOSIS — H90.72 MIXED CONDUCTIVE AND SENSORINEURAL HEARING LOSS OF LEFT EAR, UNSPECIFIED HEARING STATUS ON CONTRALATERAL SIDE: ICD-10-CM

## 2024-04-16 DIAGNOSIS — R13.10 DYSPHAGIA, UNSPECIFIED TYPE: ICD-10-CM

## 2024-04-16 PROCEDURE — 1160F RVW MEDS BY RX/DR IN RCRD: CPT | Performed by: OTOLARYNGOLOGY

## 2024-04-16 PROCEDURE — 92557 COMPREHENSIVE HEARING TEST: CPT | Performed by: AUDIOLOGIST

## 2024-04-16 PROCEDURE — 92567 TYMPANOMETRY: CPT | Performed by: AUDIOLOGIST

## 2024-04-16 PROCEDURE — 1036F TOBACCO NON-USER: CPT | Performed by: OTOLARYNGOLOGY

## 2024-04-16 PROCEDURE — 1157F ADVNC CARE PLAN IN RCRD: CPT | Performed by: OTOLARYNGOLOGY

## 2024-04-16 PROCEDURE — 1159F MED LIST DOCD IN RCRD: CPT | Performed by: OTOLARYNGOLOGY

## 2024-04-16 PROCEDURE — 99214 OFFICE O/P EST MOD 30 MIN: CPT | Performed by: OTOLARYNGOLOGY

## 2024-04-16 NOTE — PROGRESS NOTES
Chief Complaint   Patient presents with    Follow-up     EP- AUDIO F/U     HPI:  Ramirez Dobbins is a 89 y.o. female who presents today in follow-up from 3/22/2024 when we debrided a left ear granulation polyp and started her on Ciprodex.    She feels better.  Back to normal.  No pain or drainage.  Audiogram shows some mild mixed low-frequency loss on the left with type a tympanogram.  Bilateral symmetric sensorineural hearing loss.  Over a year has been having some problems with swallowing.  Seems to choke on liquids and dry foods like they get stuck.  Currently on antibiotics for a sinus infection from her PCP    PMH:  Past Medical History:   Diagnosis Date    Acetabulum fracture, left (Multi) 10/05/2023    Body mass index (BMI) 21.0-21.9, adult 05/24/2022    BMI 21.0-21.9, adult    Cough, unspecified 04/05/2016    Cough    Encounter for screening mammogram for malignant neoplasm of breast     Visit for screening mammogram    Iliotibial band syndrome, left leg 07/01/2020    Iliotibial band syndrome of left side    Other forms of dyspnea 07/08/2021    Dyspnea on exertion    Pain in unspecified hip 01/02/2015    Joint pain, hip    Personal history of other diseases of the respiratory system 04/29/2015    History of acute sinusitis    Personal history of other diseases of the respiratory system 04/20/2016    History of acute bronchitis    Personal history of other specified conditions 06/23/2015    History of vertigo    Personal history of urinary (tract) infections 09/01/2016    History of urinary tract infection    Personal history of urinary (tract) infections 11/15/2013    History of urinary tract infection    Sprain of unspecified ligament of left ankle, initial encounter 06/03/2016    Left ankle sprain    Sprain of unspecified part of unspecified wrist and hand, initial encounter 06/27/2013    Hand sprain     Past Surgical History:   Procedure Laterality Date    CHOLECYSTECTOMY  05/05/2016    Cholecystectomy     JOINT REPLACEMENT Left 2014    total hip surgery    OTHER SURGICAL HISTORY Left 04/01/2024    cut polyps out of ear         Medications:     Current Outpatient Medications:     amLODIPine (Norvasc) 5 mg tablet, Take 1 tablet (5 mg) by mouth once daily., Disp: 90 tablet, Rfl: 1    apixaban (Eliquis) 2.5 mg tablet, Take 1 tablet (2.5 mg) by mouth every 12 hours., Disp: 180 tablet, Rfl: 3    calcitonin, salmon, (Miacalcin) 200 unit/actuation nasal spray, Administer 1 spray into one nostril once daily., Disp: 3.7 mL, Rfl: 11    cefdinir (Omnicef) 300 mg capsule, Take 1 capsule (300 mg) by mouth 2 times a day for 7 days., Disp: 14 capsule, Rfl: 0    cholecalciferol (Vitamin D-3) 50 MCG (2000 UT) tablet, Take 1 tablet (2,000 Units) by mouth once daily., Disp: , Rfl:     ciprofloxacin-dexamethasone (CiproDEX) otic suspension, 4 drops to affected ear/s twice daily for 10 days. 1 bottle. 1 refill., Disp: 7.5 mL, Rfl: 1    diclofenac epolamine 1.3 % patch 24 hour, Place 1 patch on the skin once daily as needed (pain)., Disp: 30 patch, Rfl: 3    famotidine (Pepcid) 20 mg tablet, TAKE 1 TABLET BY MOUTH TWICE A DAY (Patient taking differently: Take 1 tablet (20 mg) by mouth 2 times a day. TO DECREASE STOMACH ACID), Disp: 90 tablet, Rfl: 3    levalbuterol (Xopenex) 45 mcg/actuation inhaler, Inhale 1-2 puffs every 4 hours if needed for wheezing or shortness of breath., Disp: , Rfl:     lidocaine (Lidoderm) 5 % patch, Place 1 patch on the skin once daily. Remove & discard patch within 12 hours or as directed by MD., Disp: , Rfl:     melatonin 1 mg tablet,chewable, Chew., Disp: , Rfl:     metoprolol succinate XL (Toprol-XL) 200 mg 24 hr tablet, TAKE 1 TABLET BY MOUTH EVERY DAY, Disp: 90 tablet, Rfl: 1    multivit-min/vit C/herb no.124 (A[place], ASCORBIC ACID, ORAL), Take by mouth., Disp: , Rfl:     sennosides-docusate sodium (Rosa Maria-Colace) 8.6-50 mg tablet, Take 1 tablet by mouth once daily., Disp: , Rfl:     tamoxifen (Nolvadex) 20  "mg tablet, Take 1 tablet (20 mg total) by mouth once daily., Disp: 90 tablet, Rfl: 2    Trelegy Ellipta 200-62.5-25 mcg blister with device, INHALE 1 PUFF ,ONCE DAILY (Patient taking differently: No sig reported), Disp: 60 each, Rfl: 11    methocarbamol (Robaxin) 500 mg tablet, Take 0.5 tablets (250 mg) by mouth 3 times a day as needed for muscle spasms., Disp: 40 tablet, Rfl: 2     Allergies:  Allergies   Allergen Reactions    Codeine Hallucinations    Nitrofurantoin Dizziness and Hallucinations    Penicillins Hives    Albuterol Unknown    Sulfamethoxazole-Trimethoprim Unknown        ROS:  Review of systems normal unless stated otherwise in the HPI and/or PMH.    Physical Exam:  Height 1.549 m (5' 1\"), weight 47.6 kg (105 lb). Body mass index is 19.84 kg/m².     GENERAL APPEARANCE: Well developed and well nourished.  Alert and oriented in no acute distress.  Normal vocal quality.      HEAD/FACE: No erythema or edema or facial tenderness.  Normal facial nerve function bilaterally.    EAR:       EXTERNAL: Normal pinnas and external auditory canals without lesion or obstructing wax.         MIDDLE EAR: Tympanic membranes intact and mobile with normal landmarks.  Middle ear space appears well aerated.  Granulation polyp resolved.  No evidence of disease.       TUBE STATUS: N/A       MASTOID CAVITY: N/A       HEARING: Gross hearing assessment is within normal limits.      NOSE:       VISUALIZED USING: Anterior rhinoscopy with headlight and nasal speculum.       DORSUM: Midline, nontraumatic appearance.       MUCOSA: Normal-appearing.       SECRETIONS: Normal.       SEPTUM: Midline and nonobstructing.       INFERIOR TURBINATES: Normal.       MIDDLE TURBINATES/MEATUS: N/A       BLEEDING: N/A         ORAL CAVITY/PHARYNX:       TEETH: Adequate dentition.       TONGUE: No mass or lesion.  Normal mobility.       FLOOR OF MOUTH: No mass or lesion.       PALATE: Normal hard palate, soft palate, and uvula.       OROPHARYNX: " Normal without mass or lesion.       BUCCAL MUCOSA/GBS: Normal without mass or lesion.       LIPS: Normal.    LARYNX/HYPOPHARYNX/NASOPHARYNX: Indirect mirror exam is adequate and shows normal oropharynx, hypopharynx, and larynx including normal vocal cord movement without any masses or lesions.  No pooling of secretions.    NECK: No palpable masses or abnormal adenopathy.  Trachea is midline.    THYROID: No thyromegaly or palpable nodule.    SALIVARY GLANDS: Normal bilateral parotid and submandibular glands by inspection and palpation.    TMJ's: Normal.    NEURO: Cranial nerve exam grossly normal bilaterally.       Assessment/Plan   Ramirez was seen today for follow-up.  Diagnoses and all orders for this visit:  Polyp of left middle ear (Primary)  Bilateral sensorineural hearing loss  Mixed conductive and sensorineural hearing loss of left ear, unspecified hearing status on contralateral side  Dysphagia, unspecified type  -     FL modified barium swallow study; Future       Left granulation polyp from the epitympanum has resolved.  Has some bilateral hearing loss with a little bit of a mixed extra conductive component on the left.  Normal exam with any great concern.  Recommend to follow this.  She is not ready for hearing aids.  Check modified barium swallow for her dysphagia.  Follow up for test results after testing is complete.     Naldo Patel MD

## 2024-04-17 ENCOUNTER — PATIENT MESSAGE (OUTPATIENT)
Dept: PRIMARY CARE | Facility: CLINIC | Age: 89
End: 2024-04-17
Payer: MEDICARE

## 2024-04-17 DIAGNOSIS — M16.51 POST-TRAUMATIC OSTEOARTHRITIS OF RIGHT HIP: ICD-10-CM

## 2024-04-17 DIAGNOSIS — M47.818 SI JOINT ARTHRITIS: ICD-10-CM

## 2024-04-17 DIAGNOSIS — J44.9 CHRONIC OBSTRUCTIVE PULMONARY DISEASE, UNSPECIFIED (MULTI): ICD-10-CM

## 2024-04-17 DIAGNOSIS — K21.9 GASTROESOPHAGEAL REFLUX DISEASE WITHOUT ESOPHAGITIS: ICD-10-CM

## 2024-04-17 RX ORDER — FLUTICASONE FUROATE, UMECLIDINIUM BROMIDE AND VILANTEROL TRIFENATATE 200; 62.5; 25 UG/1; UG/1; UG/1
1 POWDER RESPIRATORY (INHALATION) DAILY
Qty: 60 EACH | Refills: 11 | Status: SHIPPED | OUTPATIENT
Start: 2024-04-17

## 2024-04-17 RX ORDER — FAMOTIDINE 20 MG/1
20 TABLET, FILM COATED ORAL 2 TIMES DAILY
Qty: 90 TABLET | Refills: 3 | Status: SHIPPED | OUTPATIENT
Start: 2024-04-17

## 2024-04-17 RX ORDER — AMLODIPINE BESYLATE 5 MG/1
5 TABLET ORAL DAILY
Qty: 90 TABLET | Refills: 1 | Status: SHIPPED | OUTPATIENT
Start: 2024-04-17 | End: 2024-05-24

## 2024-04-17 RX ORDER — METHOCARBAMOL 500 MG/1
250 TABLET, FILM COATED ORAL 3 TIMES DAILY PRN
Qty: 40 TABLET | Refills: 2 | Status: SHIPPED | OUTPATIENT
Start: 2024-04-17 | End: 2024-06-16

## 2024-04-17 NOTE — TELEPHONE ENCOUNTER
From: Ramirez Dobbins  To: Jose Schafer  Sent: 4/17/2024 10:31 AM EDT  Subject: Requesting refills    Good morning, requesting refills for the following meds for Ramirez (90 day supply on each): Trelegy, Amlodipine, Famotidine, and Methocarbamol. Thank you!

## 2024-04-18 ENCOUNTER — TREATMENT (OUTPATIENT)
Dept: PHYSICAL THERAPY | Facility: CLINIC | Age: 89
End: 2024-04-18
Payer: MEDICARE

## 2024-04-18 DIAGNOSIS — M47.818 SI JOINT ARTHRITIS: ICD-10-CM

## 2024-04-18 DIAGNOSIS — M54.9 BACK PAIN: ICD-10-CM

## 2024-04-18 PROCEDURE — 97110 THERAPEUTIC EXERCISES: CPT | Mod: KX,GP | Performed by: PHYSICAL THERAPIST

## 2024-04-18 NOTE — PROGRESS NOTES
Physical Therapy    Physical Therapy Treatment    Patient Name: Ramirez Dobbins  MRN: [id number]  Today's Date: 4/2/2024  Time Calculation  Start Time: 1730  Stop Time: 1814  Time Calculation (min): 44 min    General  Reason for Referral: SI joint arthritis  Referred By: Dr. Mullins  Past Medical History Relevant to Rehab: Fall with break in pelvis, fall with fracture of T10 (per patient), Breast CA, remote left NESS    Assessment:   Continued focus on standing and balance exercise today.  Patient demonstrated good technique with exercises requiring minimal verbal cueing.  Patient benefits from close SBA to intermittent CGA as needed for standing exercises.  Patient noted no increased pain at end of session.         Plan:  Continue per PT POC        Subjective    Pt reports 5/10 LBP today and up to 5/10 intermittent left knee pain.  Notes no left knee pain at rest.  No new falls since last PT visit.  She reports compliance with HEP.  States 'I walk a mile in my driveway everyday' with walker.  She arrives to appointment using rollator. Pt's daughter in law brought patient to therapy appointment today.  She has a follow up with Dr. Temple on 4-.  Pt reports no new injections during most appointment with Dr. Temple.     Precautions: Breast CA, monitoring radiographically, Falls, left NESS, hx of pelvic fracture, osteoporosis, Xray showing loss of height at T11, STEADi 9      Pain:   0/10     Objective   Treatments:  Therapeutic Exercise: (40 minutes)  Recumbent stepper seat #10 level 1.0 x 8 minutes     Standing in parallel bars with 1-2 UE support:  March forward x 3 laps in parallel bars   Backward walking x 3 laps in parallel bars  Zig zag walk in parallel bars x 3 laps each way (forward /backward, left to right)  -B heel/toe raises with BUE support x 25 reps   -reaching L/R with SBA and intermittent CGA in parallel bars x 20 L/R  -Side stepping in // bars x 3 laps no UE support; SBA  MIP on gray foam x 20  "L/R  Static standing while reaching left to right with ball x 15 L/R  Static standing while reaching forward with ball x 15  Hip abduction/SLR 2 x 10 L/R  Scapular retraction x 20 bilateral  Hip extension toe taps x 20 L/R  -Backward shoulder rolls 2 x 10   -B shoulder shrugs 2 x 10 reps,  -Standing B long arm extension with yellow theraband 2 x 10     Not completed today 4-:  Seated in chair with back support:   -Backward shoulder rolls 2 x 10 and B shoulder shrugs 2 x 10 reps, holding posture in pelvic in neutral; verbal cueing for technique     Holding onto walker with 2 UEs  -Hip extension press into stability ball maintaining upright posture with UE Assist from walker 2  x 10, 5\" hold R/L    Seated:  -Postural correction with APT <> neutral  x 10 reps in neutral trunk positioning   -Scapular retraction 2 x 10 bilateral, holding posture in pelvic neutral   -B shoulder flexion with PVC wand to about  deg 2 x 10 reps    Hooklying with wedge and single pillow:   -Bekah Yessi B shoulder press with scapular retraction and arm press 5\" hold 2 x 10 reps   -Bekah Yessi R/L leg press into the mat 5\" hold  2 x 10 reps  -B glute set in supine 2 x 10 reps, 5 sec hold   -B shoulder horizontal ABD 2 x 10 reps (no theraband)  -Hooklying B hip adduction ball squeeze 2 x 10 reps, 5 sec    -Alt arm lifts 2 x 10 reps each R/L         OP EDUCATION: Continue with current HEP.     "

## 2024-04-21 NOTE — PROGRESS NOTES
"  AUDIOLOGY ADULT AUDIOMETRIC EVALUATION    Name:  Ramirez Dobbins  :  6/10/1933  Age:  89 y.o.  Date of Evaluation:  2024    Reason for visit: Ramirez is seen with her daughter-in-law  in the clinic today at the request of otolaryngology for an audiologic evaluation.     HISTORY  Patient reports she recently had a polyp removed from left ear; doing better.   She reports she hears \"crickets\" in both ears.    She reports having difficulty understanding others in group setting.       EVALUATION  See scanned audiogram: “Media” > “Audiology Report”.      RESULTS  Otoscopic Evaluation:  Right Ear: clear ear canal  Left Ear: clear ear canal    Immittance Measures:  Tympanometry:  *screening*  difficult to seal  Right Ear: Type A, normal tympanic membrane mobility with normal middle ear pressure  Left Ear: Type A, normal tympanic membrane mobility with normal middle ear pressure    Acoustic Reflexes:  Ipsilateral Right Ear: could not maintain seal  Ipsilateral Left Ear: could not maintain seal  Contralateral Right Ear: did not evaluate  Contralateral Left Ear: did not evaluate    Distortion Product Otoacoustic Emissions (DPOAEs):  Right Ear: Passed at 1KHz; Refer at 1500Hz-8KHz  Left Ear: Refer at 1KHz-8KHz     Audiometry:  Test Technique and Reliability:   Standard audiometry via supra-aural headphones. Reliability is good.    Pure tone air and bone conduction audiometry:  Right Ear: Mild sensorineural hearing loss through 3KHz, moderate to profound at 4KHz-8KHz   Left Ear: Moderate to mild mixed hearing loss through 2KHz, sloping to moderate to profound at 3KHz and above    Speech Audiometry (Word Recognition Scores):   Right Ear: Excellent at most comfortable listening level of loudness of 70dBHL  Left Ear: Excellent at most comfortable listening level of loudness  of 85dBHL    IMPRESSIONS    Right ear demonstrated mild to moderate sensorineural hearing loss with excellent word discrimination at 70dBHL     Left " ear demonstrated a moderate to mild mixed hearing loss with excellent word discrimination at 85dBHL    RECOMMENDATIONS  - Follow up with otolaryngology today as scheduled.  - Annual audiologic evaluation, sooner if an acute change is noted.  - Reviewed results at length with patient and her daughter in law.   Hearing aid evaluation recommended to learn more about hearing aids; try demo     PATIENT EDUCATION  Discussed results, impressions and recommendations with the patient. Questions were addressed and the patient was encouraged to contact our office should concerns arise.    Time for this encounter: 140-215    Kenya Aguayo M.A., CCC/A   Licensed Audiologist

## 2024-04-23 ENCOUNTER — TREATMENT (OUTPATIENT)
Dept: PHYSICAL THERAPY | Facility: CLINIC | Age: 89
End: 2024-04-23
Payer: MEDICARE

## 2024-04-23 DIAGNOSIS — M54.9 BACK PAIN: ICD-10-CM

## 2024-04-23 DIAGNOSIS — M47.818 SI JOINT ARTHRITIS: ICD-10-CM

## 2024-04-23 PROCEDURE — 97110 THERAPEUTIC EXERCISES: CPT | Mod: KX,GP | Performed by: PHYSICAL THERAPIST

## 2024-04-23 NOTE — PROGRESS NOTES
Physical Therapy    Physical Therapy Treatment    Patient Name: Ramirez Dobbins  MRN: [id number]  Today's Date: 4/23/2024  Time Calculation  Start Time: 1304  Stop Time: 1346  Time Calculation (min): 42 min      General  Reason for Referral: SI joint arthritis  Referred By: Dr. Mullins  Past Medical History Relevant to Rehab: Fall with break in pelvis, fall with fracture of T10 (per patient), Breast CA, remote left NESS    Current Problem  1. SI joint arthritis  Follow Up In Physical Therapy      2. Back pain  Follow Up In Physical Therapy          Goals:  Active       Back Pain       Patient will be independent and adherent to HEP to enhance functional progress and long term management of condition.  (Met)       Start:  01/26/24    Expected End:  03/08/24    Resolved:  03/29/24         Patient will improved Modified Oswestry score by 13 points indicating meaningful clinical change in function due to reduction of back pain. (Met)       Start:  01/26/24    Expected End:  03/08/24    Resolved:  03/29/24         Patient will report reduction of back pain by 50% or greater to ease performance of functional mobility and IADLs (Progressing)       Start:  01/26/24    Expected End:  04/26/24            Patient will increase LE and trunk strength by 1/3 MMT grade to facilitate muscle performance necessary for maintaining symmetrical postural alignement and functional mobility. (Progressing)       Start:  01/26/24    Expected End:  04/26/24            Patient will ambulate household distances with cane with mod I with no evidence of LOB or instability to improve independence in her home.  (Not Progressing)       Start:  01/26/24    Expected End:  04/26/24            Patient will demonstrate Good static and dynamic standing balance.  (Progressing)       Start:  01/26/24    Expected End:  04/26/24               Back Pain       Patient will have a Caruso versus TUG assessment and goal established.       Start:  03/29/24    Expected  "End:  04/26/24            Patient will be able to maintain tandem stance for 30\" indicating improved static balance to reduce fall risk.        Start:  03/29/24    Expected End:  04/26/24                Assessment:   Continued focus on standing and balance exercise today.  Pt was able to tolerated increased reps of exercises today demonstrating improving muscular endurance and exercise tolerance. No increased pain complaints after treatment today.  Patient benefits from close SBA to intermittent CGA as needed for standing exercises.  Patient noted no increased pain at end of session.         Plan:  Continue per PT POC        Subjective    Pt reports 0/10 pain today.  No new falls since last PT visit.  Notes no new complaints.  She reports compliance with HEP.   She arrives to appointment using rollator. Pt's daughter in law brought patient to therapy appointment today.      Precautions: Breast CA, monitoring radiographically, Falls, left NESS, hx of pelvic fracture, osteoporosis, Xray showing loss of height at T11, STEADi 9      Pain:   0/10     Objective   Treatments:  Therapeutic Exercise: (40 minutes)  Recumbent stepper seat #10, level 1.0 x 9 minutes     Standing in parallel bars with 1-2 UE support:  March forward x 3 laps in parallel bars   Backward walking x 3 laps in parallel bars  Zig zag walk in parallel bars x 3 laps each way (forward /backward, left to right)  -B heel/toe raises with BUE support x 25 reps   -reaching L/R with SBA and intermittent CGA in parallel bars x 20 L/R  -Side stepping in // bars x 3 laps no UE support; SBA  MIP on gray foam x 20 L/R  Static standing while reaching left to right with ball x 20 L/R  Static standing while reaching forward with ball x 20  Hip abduction/SLR 2 x 10 L/R  Scapular retraction x 20 bilateral  Hip extension toe taps x 20 L/R  -Backward shoulder rolls 2 x 10   -B shoulder shrugs 2 x 10 reps with 1# hand held weights  -Standing B long arm extension with yellow " "theraband 2 x 10   Shoulder low rows x 20 bilateral  Bilateral shoulder flexion to shoulder height x 20     Not completed today 4-:  Seated in chair with back support:   -Backward shoulder rolls 2 x 10 and B shoulder shrugs 2 x 10 reps, holding posture in pelvic in neutral; verbal cueing for technique     Holding onto walker with 2 UEs  -Hip extension press into stability ball maintaining upright posture with UE Assist from walker 2  x 10, 5\" hold R/L    Seated:  -Postural correction with APT <> neutral  x 10 reps in neutral trunk positioning   -Scapular retraction 2 x 10 bilateral, holding posture in pelvic neutral   -B shoulder flexion with PVC wand to about  deg 2 x 10 reps    Hooklying with wedge and single pillow:   -Bekah Yessi B shoulder press with scapular retraction and arm press 5\" hold 2 x 10 reps   -Bekah Yessi R/L leg press into the mat 5\" hold  2 x 10 reps  -B glute set in supine 2 x 10 reps, 5 sec hold   -B shoulder horizontal ABD 2 x 10 reps (no theraband)  -Hooklying B hip adduction ball squeeze 2 x 10 reps, 5 sec    -Alt arm lifts 2 x 10 reps each R/L       OP EDUCATION: Continue with current HEP.    Kezia Godoy, PT [id number]     "

## 2024-04-25 ENCOUNTER — TREATMENT (OUTPATIENT)
Dept: PHYSICAL THERAPY | Facility: CLINIC | Age: 89
End: 2024-04-25
Payer: MEDICARE

## 2024-04-25 ENCOUNTER — HOSPITAL ENCOUNTER (OUTPATIENT)
Dept: RADIOLOGY | Facility: HOSPITAL | Age: 89
End: 2024-04-25
Payer: MEDICARE

## 2024-04-25 DIAGNOSIS — M47.818 SI JOINT ARTHRITIS: ICD-10-CM

## 2024-04-25 DIAGNOSIS — M54.9 BACK PAIN: ICD-10-CM

## 2024-04-25 PROCEDURE — 97110 THERAPEUTIC EXERCISES: CPT | Mod: GP,KX | Performed by: PHYSICAL THERAPIST

## 2024-04-25 NOTE — PROGRESS NOTES
Physical Therapy    Physical Therapy Treatment    Patient Name: Ramirez Dobbins  MRN: [id number]  Today's Date: 4/25/2024  Time Calculation  Start Time: 1430  Stop Time: 1515  Time Calculation (min): 45 min  Onset Date:12/15/23  Insurance: No Authorization required  Visit Number:14  Start/Stop Time: 2800-6398      Assessment: Patient is demonstrating more upright posture throughout her exercises.  She declined rest breaks today. She had no complaints of LBP throughout the session.  She had only 1 incidence of LOB when rising to stand from her chair at the beginning of the session, but she recognized it, sat down and the next attempt was successful. She appears to be progressing very well toward her goals.       Plan: Continue with LE and trunk strengthening and balance exercises. Add semi-tandem and tandem standing.         Current Problem  1. SI joint arthritis  Follow Up In Physical Therapy      2. Back pain  Follow Up In Physical Therapy          General:  General  Reason for Referral: SI joint arthritis  Referred By: Dr. Mullins  Past Medical History Relevant to Rehab: Fall with break in pelvis, fall with fracture of T10 (per patient), Breast CA, remote left NESS          Subjective  Patient reports having a very hard workout  last session. But woke up the next morning feeling great.  Was able to stand straight up out of bed and walk without pain.  Precautions: Breast CA, monitoring radiographically, Falls, left NESS, hx of pelvic fracture, osteoporosis, Xray showing loss of height at T11, STEADi 9      Pain: 0/10       Objective   Treatments:  Therapeutic Exercise: (45 minutes)  Recumbent stepper seat #10, level 1.0 x 9 minutes     Standing in parallel bars with 1-2 UE support:  March forward x 3 laps in parallel bars   Backward walking x 3 laps in parallel bars  Zig zag walk in parallel bars x 3 laps each way (forward /backward, left to right)  -B heel/toe raises with BUE support x 25 reps   -reaching L/R with SBA  "and intermittent CGA in parallel bars x 20 L/R  -Side stepping in // bars x 3 laps no UE support; SBA  -MIP on gray foam x 20 L/R  -Static standing while reaching left to right with ball x 20 L/R  -Static standing while reaching forward with ball x 20  -Hip abduction/SLR 2 x 10 L/R  -standing Scapular retraction/row x 20 bilateral  -Hip extension toe taps x 20 L/R  -Backward shoulder rolls 2 x 10   -B shoulder shrugs 2 x 10 reps with 1# hand held weights  -Standing B long arm extension with yellow theraband 2 x 10   Shoulder low rows x 20 bilateral  Bilateral shoulder flexion to shoulder height x 20      Not completed today 4-:  Seated in chair with back support:   -Backward shoulder rolls 2 x 10 and B shoulder shrugs 2 x 10 reps, holding posture in pelvic in neutral; verbal cueing for technique       OP EDUCATION:       Goals:  Physical Therapy - Physical Therapy - August 2023 Problems       Physical Therapy - Physical Therapy - August 2023 Problems (Resolved)       Balance       Pt will reduce TUG score by at least 3 sec to decrease the risk of falls. (Met)       Start:  10/03/23    Expected End:  11/13/23    Resolved:  10/23/23            PT Problem       Pt will improve Romberg score by 2/6 score (Met)       Start:  10/03/23    Expected End:  11/13/23    Resolved:  10/23/23         Pt will improve gait mechanics evidenced by equal step length, increased WBing LLE, and LRAD in order to participate in ADL, IADL, work, and leisure skills   (Met)       Start:  10/03/23    Expected End:  11/13/23    Resolved:  10/23/23         Pt will increase strength of BLE musculature by at least 1 mm grade to participate in ADL, IADL, work, and leisure skills   (Not Progressing)       Start:  10/03/23    Expected End:  11/13/23    Resolved:  11/29/23         Pt will improve \"AMPAC\" score to at least 30 for increased independence and ease with ADL/IADL's, skills, and work/leisure activities.   (Not Progressing)       Start: "  10/03/23    Expected End:  11/13/23    Resolved:  11/29/23      Goal Note       NOT MET               Pt will improve stair negotiation by use of a reciprocal pattern with single use of HR to be able to negotiate different levels of home   (Met)       Start:  10/03/23    Expected End:  11/13/23    Resolved:  10/23/23         Pt will ambulate short household sitances ~50' with use of straight cane without LOB, mod I, equal WBing through LE's, and proper aurea to improve independence of mobility within home (Not Progressing)       Start:  10/23/23    Expected End:  12/18/23    Resolved:  11/29/23                  Physical Therapy 1/24/24 Problems       Physical Therapy 1/24/24 Problems (Active)       Back Pain       Patient will be independent and adherent to HEP to enhance functional progress and long term management of condition.  (Met)       Start:  01/26/24    Expected End:  03/08/24    Resolved:  03/29/24         Patient will improved Modified Oswestry score by 13 points indicating meaningful clinical change in function due to reduction of back pain. (Met)       Start:  01/26/24    Expected End:  03/08/24    Resolved:  03/29/24         Patient will report reduction of back pain by 50% or greater to ease performance of functional mobility and IADLs (Progressing)       Start:  01/26/24    Expected End:  04/26/24            Patient will increase LE and trunk strength by 1/3 MMT grade to facilitate muscle performance necessary for maintaining symmetrical postural alignement and functional mobility. (Progressing)       Start:  01/26/24    Expected End:  04/26/24            Patient will ambulate household distances with cane with mod I with no evidence of LOB or instability to improve independence in her home.  (Not Progressing)       Start:  01/26/24    Expected End:  04/26/24            Patient will demonstrate Good static and dynamic standing balance.  (Progressing)       Start:  01/26/24    Expected End:   "04/26/24               Back Pain       Patient will have a Caruso versus TUG assessment and goal established.       Start:  03/29/24    Expected End:  04/26/24            Patient will be able to maintain tandem stance for 30\" indicating improved static balance to reduce fall risk.        Start:  03/29/24    Expected End:  04/26/24                 "

## 2024-04-30 ENCOUNTER — TREATMENT (OUTPATIENT)
Dept: PHYSICAL THERAPY | Facility: CLINIC | Age: 89
End: 2024-04-30
Payer: MEDICARE

## 2024-04-30 DIAGNOSIS — M47.818 SI JOINT ARTHRITIS: ICD-10-CM

## 2024-04-30 DIAGNOSIS — M54.9 BACK PAIN: ICD-10-CM

## 2024-04-30 PROCEDURE — 97110 THERAPEUTIC EXERCISES: CPT | Mod: GP,KX,CQ

## 2024-04-30 NOTE — PROGRESS NOTES
Physical Therapy    Physical Therapy Treatment    Patient Name: Ramirez Dobbins  MRN: [id number]  Today's Date: 4/30/2024  Time Calculation  Start Time: 1430  Stop Time: 1515  Time Calculation (min): 45 min    Onset Date:12/15/23  Insurance: No Authorization required  Visit Number:15        Assessment: Pt demonstrates good endurance and no LOB with activity completed today .Pt. Tolerated previous session well . No complaints voiced at beginning , during , or immed. Following PT session today    Plan: Continue with LE and trunk strengthening and balance exercises.        Current Problem  1. SI joint arthritis  Follow Up In Physical Therapy      2. Back pain  Follow Up In Physical Therapy          General:  General  Reason for Referral: SI joint arthritis  Referred By: Dr. Mullins  Past Medical History Relevant to Rehab: Fall with break in pelvis, fall with fracture of T10 (per patient), Breast CA, remote left NESS          Subjective   Feeling good and continues to feel she is making gains in balance and strength . Knows that core strengthening UE exercises have been helpful in general decrease in ache and stiffness . No complaints of knee pain. Feels endurance significantly improved . Would prefer to not need walker for support but does recognize importance of continued use.  Notes most challenging ADL is getting up from seated position .    Precautions: Breast CA, monitoring radiographically, Falls, left NESS, hx of pelvic fracture, osteoporosis, Xray showing loss of height at T11, STEADi 9      Pain: 0/10 start and end of session       Objective   Treatments:  Therapeutic Exercise: (45 minutes)  -standing Scapular retraction/row x 20 bilateral yellow t-band resistance   Recumbent stepper seat #10, level 1.0 x 10 minutes  Yellow t-band resisted tricep press seated   -Standing B LAE with yellow theraband 2 x 10 SBA   Bilateral shoulder flexion to shoulder height x 20 yellow t-band standing SBA   Seated lat pulls yellow  "t-band resisted 20 reps   3# wand shoulder press 2 sets of 10 seated   3# wand chest press 2 sets of 10 seated   Standing in parallel bars with 1-2 UE support:  MIP holding 3# wand SBA  forward walking x 3 laps in parallel bars  holding 3# SBA   Backward walking x 3 laps in parallel bars  holding 3# SBA    -reaching L/R with SBA and intermittent CGA in parallel bars x 20 L/R  -Side stepping in // bars x 3 laps green t-band resistance -MIP on gray foam x 20 L/R  -Static on [employer] standing while reaching left to right with ball x 20 L/R feet apart  -Static on [employer] standing while reaching forward with ball x 20 feet together  -Hip abduction/SLR 2 x 10 L/R    STS 22\" height hands on thighs 10 reps       OP EDUCATION:   No additions to HEP 4/30/24    Goals:  Physical Therapy - Physical Therapy - August 2023 Problems       Physical Therapy - Physical Therapy - August 2023 Problems (Resolved)       Balance       Pt will reduce TUG score by at least 3 sec to decrease the risk of falls. (Met)       Start:  10/03/23    Expected End:  11/13/23    Resolved:  10/23/23            PT Problem       Pt will improve Romberg score by 2/6 score (Met)       Start:  10/03/23    Expected End:  11/13/23    Resolved:  10/23/23         Pt will improve gait mechanics evidenced by equal step length, increased WBing LLE, and LRAD in order to participate in ADL, IADL, work, and leisure skills   (Met)       Start:  10/03/23    Expected End:  11/13/23    Resolved:  10/23/23         Pt will increase strength of BLE musculature by at least 1 mm grade to participate in ADL, IADL, work, and leisure skills   (Not Progressing)       Start:  10/03/23    Expected End:  11/13/23    Resolved:  11/29/23         Pt will improve \"AMPAC\" score to at least 30 for increased independence and ease with ADL/IADL's, skills, and work/leisure activities.   (Not Progressing)       Start:  10/03/23    Expected End:  11/13/23    Resolved:  11/29/23      Goal Note       " NOT MET               Pt will improve stair negotiation by use of a reciprocal pattern with single use of HR to be able to negotiate different levels of home   (Met)       Start:  10/03/23    Expected End:  11/13/23    Resolved:  10/23/23         Pt will ambulate short household sitances ~50' with use of straight cane without LOB, mod I, equal WBing through LE's, and proper aurea to improve independence of mobility within home (Not Progressing)       Start:  10/23/23    Expected End:  12/18/23    Resolved:  11/29/23                  Physical Therapy 1/24/24 Problems       Physical Therapy 1/24/24 Problems (Active)       Back Pain       Patient will be independent and adherent to HEP to enhance functional progress and long term management of condition.  (Met)       Start:  01/26/24    Expected End:  03/08/24    Resolved:  03/29/24         Patient will improved Modified Oswestry score by 13 points indicating meaningful clinical change in function due to reduction of back pain. (Met)       Start:  01/26/24    Expected End:  03/08/24    Resolved:  03/29/24         Patient will report reduction of back pain by 50% or greater to ease performance of functional mobility and IADLs (Progressing)       Start:  01/26/24    Expected End:  04/26/24            Patient will increase LE and trunk strength by 1/3 MMT grade to facilitate muscle performance necessary for maintaining symmetrical postural alignement and functional mobility. (Progressing)       Start:  01/26/24    Expected End:  04/26/24            Patient will ambulate household distances with cane with mod I with no evidence of LOB or instability to improve independence in her home.  (Not Progressing)       Start:  01/26/24    Expected End:  04/26/24            Patient will demonstrate Good static and dynamic standing balance.  (Progressing)       Start:  01/26/24    Expected End:  04/26/24               Back Pain       Patient will have a Caruso versus TUG assessment  "and goal established.       Start:  03/29/24    Expected End:  04/26/24            Patient will be able to maintain tandem stance for 30\" indicating improved static balance to reduce fall risk.        Start:  03/29/24    Expected End:  04/26/24                 "

## 2024-05-02 ENCOUNTER — HOSPITAL ENCOUNTER (OUTPATIENT)
Dept: RADIOLOGY | Facility: HOSPITAL | Age: 89
Discharge: HOME | End: 2024-05-02
Payer: MEDICARE

## 2024-05-02 ENCOUNTER — APPOINTMENT (OUTPATIENT)
Dept: PRIMARY CARE | Facility: CLINIC | Age: 89
End: 2024-05-02
Payer: MEDICARE

## 2024-05-02 DIAGNOSIS — R13.10 DYSPHAGIA, UNSPECIFIED TYPE: ICD-10-CM

## 2024-05-02 PROCEDURE — 74230 X-RAY XM SWLNG FUNCJ C+: CPT | Performed by: STUDENT IN AN ORGANIZED HEALTH CARE EDUCATION/TRAINING PROGRAM

## 2024-05-02 PROCEDURE — 92611 MOTION FLUOROSCOPY/SWALLOW: CPT | Mod: GN,KX

## 2024-05-02 PROCEDURE — 74230 X-RAY XM SWLNG FUNCJ C+: CPT

## 2024-05-02 PROCEDURE — 2500000001 HC RX 250 WO HCPCS SELF ADMINISTERED DRUGS (ALT 637 FOR MEDICARE OP): Performed by: OTOLARYNGOLOGY

## 2024-05-02 RX ADMIN — BARIUM SULFATE 15 ML: 400 PASTE ORAL at 15:56

## 2024-05-02 RX ADMIN — BARIUM SULFATE 5 ML: 400 SUSPENSION ORAL at 15:57

## 2024-05-02 RX ADMIN — BARIUM SULFATE 90 ML: 400 SUSPENSION ORAL at 15:56

## 2024-05-02 RX ADMIN — BARIUM SULFATE 110 ML: 0.81 POWDER, FOR SUSPENSION ORAL at 15:56

## 2024-05-02 RX ADMIN — BARIUM SULFATE 700 MG: 700 TABLET ORAL at 15:55

## 2024-05-02 NOTE — PROCEDURES
"Speech-Language Pathology      Modified Barium Swallow Study     Patient Name: Ramirez Dobbins  MRN: [id number]  : 6/10/1933  Today's Date: 24  Time Calculation  Start Time: 1505  Stop Time: 1530  Time Calculation (min): 25 min       Recommendations:  Regular diet with thin liquids  Upright posture for oral intake and for 30 minutes afterwards  Chop hard to chew foods      Assessment/Impression:    Full detailed SLP/Radiologist Modified Barium Swallow study report can be found under Chart Review tab, Imaging tab and  titled \"[place] Modified Barium Swallow Study\"      Pt. Presenting with mild oropharyngeal dysphagia including mild oral and pharyngeal residue. No laryngeal penetration or aspiration.  Residue evident in the esophagus after the study.      MBSImP Physiological Component  ORAL PHASE:  Lip Closure - Interlabial escape/no progression to anterior lip   Tongue Control During Bolus Hold - Posterior escape of less than half of the bolus   Bolus prep/mastication - Slow prolonged mastication with complete re-collection necessary   Bolus transport/lingual motion - Slowed Tongue Motion for A-P movement of the bolus   Oral residue - Residue collection on oral structure     PHARYNGEAL PHASE:  Initiation of pharyngeal swallow - Bolus head at pit of pyriforms   Soft palate elevation - No bolus between soft palate/pharyngeal wall   Laryngeal elevation - Partial superior movement of thyroid cartilage and/or partial approximation of arytenoids to epiglottic petiole   Anterior hyoid excursion - Partial anterior movement   Epiglottic movement - Complete inversion    Laryngeal vestibule closure - Incomplete - narrow column of air/contrast in laryngeal vestibule   Pharyngeal stripping wave - Present, however, diminished   Pharyngeal contraction (A/P view) - Complete  Pharyngoesophageal segment opening - Partial distension/partial duration with partial obstruction of flow of bolus   Tongue base retraction - Narrow column of " contrast or air between tongue base and pharyngeal wall   Pharyngeal residue - Collection of residue within or on the pharyngeal structures     ESOPHAGEAL PHASE:  Esophageal clearance - Esophageal retention     Plan:  SLP Plan: No treatment needs identified at this time     Pain:   Rating 0-10: 0    Education:   Pt. Educated on results of MBS study, recommended diet and recommended safe swallow strategies.  Verbal understanding given. Written and verbal instruction provided for the patient and her daughter in law who attended the study.

## 2024-05-03 ENCOUNTER — TREATMENT (OUTPATIENT)
Dept: PHYSICAL THERAPY | Facility: CLINIC | Age: 89
End: 2024-05-03
Payer: MEDICARE

## 2024-05-03 DIAGNOSIS — M47.818 SI JOINT ARTHRITIS: ICD-10-CM

## 2024-05-03 DIAGNOSIS — M54.9 BACK PAIN: ICD-10-CM

## 2024-05-03 PROCEDURE — 97110 THERAPEUTIC EXERCISES: CPT | Mod: GP,KX | Performed by: PHYSICAL THERAPIST

## 2024-05-03 NOTE — PROGRESS NOTES
"Physical Therapy    Physical Therapy Treatment    Patient Name: Ramirez Dobbins  MRN: [id number]  Today's Date: 5/3/2024  Time Calculation  Start Time: 1331  Stop Time: 1415  Time Calculation (min): 44 min  Onset Date:12/15/23  Insurance: No Authorization Required  Visit Number:16  Start/Stop Time: 4437-3807      Assessment: The patient is tolerating significant exercise volume during the session, with no complaints of back pain.  She is demonstrating very good posture.  She requires occasional cues for safety technique with walker use with sit to stand transfers. She will likely be ready for discharge in 2 visits.      Plan: Complete 2 more visits, working on LE and trunk strength and balance. Recheck in 2 visits.        Current Problem  1. SI joint arthritis  Follow Up In Physical Therapy      2. Back pain  Follow Up In Physical Therapy          General:  Reason for Referral: SI joint arthritis  Referred By: Dr. Mullins  Past Medical History Relevant to Rehab: Fall with break in pelvis, fall with fracture of T10 (per patient), Breast CA, remote left NESS          Subjective  She has been working hard on her posture.  She has pain and cramping in her left leg in morning, but walking helps relieve the cramps and the pain.  She felt good after last session - \"the harder I work, the better I feel.\"     Precautions: Breast CA, monitoring radiographically, Falls, left NESS, hx of pelvic fracture, osteoporosis, Xray showing loss of height at T11, STEADi 9           Pain: 6/10 in back.  Took Tylenol and had to walk it out       Objective   Treatments:    Therapeutic Exercise: (45 minutes)  -standing Scapular retraction/row x 20 bilateral green t-band resistance  -Standing B LAE with yellow theraband 2 x 10 SBA     Recumbent stepper seat #10, level 1.0 x 10 minutes     -3# wand shoulder press 2 sets of 10 seated   -3# wand chest press 2 sets of 10 seated     In parallel bars with SBA to CGA:  -MIP holding 3# wand SBA 2 " "x10  -forward walking x 3 laps in parallel bars  holding 3# SBA   -Backward walking x 3 laps in parallel bars  holding 3# SBA    -reaching L/R with SBA and intermittent CGA in parallel bars x 20 L/R  -Side stepping in // bars x 3 laps green t-band resistance  -Static on [employer] standing while reaching left to right with ball x 20 L/R feet apart  -Static on [employer] standing while reaching forward with ball x 20 feet together  -Hip abduction/SLR 2 x 10 L/R    Deferred:   Yellow t-band resisted tricep press seated   Bilateral shoulder flexion to shoulder height x 20 yellow t-band standing SBA   Seated lat pulls yellow t-band resisted 20 reps   -MIP on gray foam x 20 L/R  STS 22\" height hands on thighs 10 reps     OP EDUCATION:       Goals:  Physical Therapy - Physical Therapy - August 2023 Problems       Physical Therapy - Physical Therapy - August 2023 Problems (Resolved)       Balance       Pt will reduce TUG score by at least 3 sec to decrease the risk of falls. (Met)       Start:  10/03/23    Expected End:  11/13/23    Resolved:  10/23/23            PT Problem       Pt will improve Romberg score by 2/6 score (Met)       Start:  10/03/23    Expected End:  11/13/23    Resolved:  10/23/23         Pt will improve gait mechanics evidenced by equal step length, increased WBing LLE, and LRAD in order to participate in ADL, IADL, work, and leisure skills   (Met)       Start:  10/03/23    Expected End:  11/13/23    Resolved:  10/23/23         Pt will increase strength of BLE musculature by at least 1 mm grade to participate in ADL, IADL, work, and leisure skills   (Not Progressing)       Start:  10/03/23    Expected End:  11/13/23    Resolved:  11/29/23         Pt will improve \"AMPAC\" score to at least 30 for increased independence and ease with ADL/IADL's, skills, and work/leisure activities.   (Not Progressing)       Start:  10/03/23    Expected End:  11/13/23    Resolved:  11/29/23      Goal Note       NOT MET               " Pt will improve stair negotiation by use of a reciprocal pattern with single use of HR to be able to negotiate different levels of home   (Met)       Start:  10/03/23    Expected End:  11/13/23    Resolved:  10/23/23         Pt will ambulate short household sitances ~50' with use of straight cane without LOB, mod I, equal WBing through LE's, and proper aurea to improve independence of mobility within home (Not Progressing)       Start:  10/23/23    Expected End:  12/18/23    Resolved:  11/29/23                  Physical Therapy 1/24/24 Problems       Physical Therapy 1/24/24 Problems (Active)       Back Pain       Patient will be independent and adherent to HEP to enhance functional progress and long term management of condition.  (Met)       Start:  01/26/24    Expected End:  03/08/24    Resolved:  03/29/24         Patient will improved Modified Oswestry score by 13 points indicating meaningful clinical change in function due to reduction of back pain. (Met)       Start:  01/26/24    Expected End:  03/08/24    Resolved:  03/29/24         Patient will report reduction of back pain by 50% or greater to ease performance of functional mobility and IADLs (Progressing)       Start:  01/26/24    Expected End:  04/26/24            Patient will increase LE and trunk strength by 1/3 MMT grade to facilitate muscle performance necessary for maintaining symmetrical postural alignement and functional mobility. (Progressing)       Start:  01/26/24    Expected End:  04/26/24            Patient will ambulate household distances with cane with mod I with no evidence of LOB or instability to improve independence in her home.  (Not Progressing)       Start:  01/26/24    Expected End:  04/26/24            Patient will demonstrate Good static and dynamic standing balance.  (Progressing)       Start:  01/26/24    Expected End:  04/26/24               Back Pain       Patient will have a Caruso versus TUG assessment and goal established.   "     Start:  03/29/24    Expected End:  04/26/24            Patient will be able to maintain tandem stance for 30\" indicating improved static balance to reduce fall risk.        Start:  03/29/24    Expected End:  04/26/24                 "

## 2024-05-07 ENCOUNTER — TREATMENT (OUTPATIENT)
Dept: PHYSICAL THERAPY | Facility: CLINIC | Age: 89
End: 2024-05-07
Payer: MEDICARE

## 2024-05-07 DIAGNOSIS — M54.9 BACK PAIN: ICD-10-CM

## 2024-05-07 DIAGNOSIS — M47.818 SI JOINT ARTHRITIS: ICD-10-CM

## 2024-05-07 PROCEDURE — 97110 THERAPEUTIC EXERCISES: CPT | Mod: GP,KX | Performed by: PHYSICAL THERAPIST

## 2024-05-07 NOTE — PROGRESS NOTES
Physical Therapy    Physical Therapy Treatment    Patient Name: Ramirez Dobbins  MRN: [id number]  Today's Date: 5/7/2024  Time Calculation  Start Time: 1358  Stop Time: 1449  Time Calculation (min): 51 min  Onset Date:12/15/23  Insurance: No Authorization Required  Visit Number:17  Start/Stop Time: 1358-14:49    Assessment: The patient continues to demonstrate improved posture and good balance in all activities.  She demonstrated difficulty rising to stand at the beginning of the session, nearly falling back into chair due to lack of anterior weight shift, even with use of hands.  But, with training, she demonstrated ease of sit to stand, without UE support.   She has made great gains, and is 100% adherent to program and anticipate discharge to home program.      Plan: Recheck next session, probable discharge to home program       Current Problem  1. SI joint arthritis  Follow Up In Physical Therapy      2. Back pain  Follow Up In Physical Therapy          General:  Reason for Referral: SI joint arthritis  Referred By: Dr. Mullins  Past Medical History Relevant to Rehab: Fall with break in pelvis, fall with fracture of T10 (per patient), Breast CA, remote left NESS          Subjective   Patient continues to feel good after PT sessions, but back pain and leg pain is present in the morning.      Precautions: Breast CA, monitoring radiographically, Falls, left NESS, hx of pelvic fracture, osteoporosis, Xray showing loss of height at T11, STEADi 9         Pain: Before putting patch on, rates left LB pain at 6/10, but also had cramps in the leg, rated at 8/10.  Now, 0/10.       Objective     Treatments:  Therapeutic Exercise: (45 minutes)    Recumbent stepper seat #10, level 1.0 x 10 minutes    -standing Scapular retraction/row x 20 bilateral green t-band resistance  -Standing B LAE with yellow theraband 2 x 10 SBA      -3# wand shoulder press 2 sets of 10 seated (deferred)  -3# wand chest press 2 sets of 10 standing  -STS  "22\" height hands on thighs 10 reps, with 5 second lower, vc's for anterior weight shift     In parallel bars with SBA to CGA:  -MIP holding 3# wand SBA 2 x10  -forward walking x 3 laps in parallel bars  holding 3# SBA   -Backward walking x 3 laps in parallel bars  holding 3# SBA    -reaching L/R with SBA and intermittent CGA in parallel bars x 20 L/R  -Side stepping in // bars x 3 laps green t-band resistance  -Static on [employer] standing while reaching left to right with ball x 20 L/R feet apart  -Static on [employer] standing while reaching forward with ball x 20 feet together  -Hip abduction/SLR 3 x 12 L/R  -MIP on gray foam x 20 L/R    Education: Patient was educated on proper/safest use of rolling walker, by turning with it and backing up to feel her legs on the chair, before letting go.  She has a tendency to \"park\" the walker, then walk a couple steps, reaching for the chair/plinth.       Deferred:   Yellow t-band resisted tricep press seated   Bilateral shoulder flexion to shoulder height x 20 yellow t-band standing SBA   Seated lat pulls yellow t-band resisted 20 reps               Goals:  Active       Back Pain       Patient will be independent and adherent to HEP to enhance functional progress and long term management of condition.  (Met)       Start:  01/26/24    Expected End:  03/08/24    Resolved:  03/29/24         Patient will improved Modified Oswestry score by 13 points indicating meaningful clinical change in function due to reduction of back pain. (Met)       Start:  01/26/24    Expected End:  03/08/24    Resolved:  03/29/24         Patient will report reduction of back pain by 50% or greater to ease performance of functional mobility and IADLs (Progressing)       Start:  01/26/24    Expected End:  04/26/24            Patient will increase LE and trunk strength by 1/3 MMT grade to facilitate muscle performance necessary for maintaining symmetrical postural alignement and functional mobility. (Progressing) " "      Start:  01/26/24    Expected End:  04/26/24            Patient will ambulate household distances with cane with mod I with no evidence of LOB or instability to improve independence in her home.  (Not Progressing)       Start:  01/26/24    Expected End:  04/26/24            Patient will demonstrate Good static and dynamic standing balance.  (Progressing)       Start:  01/26/24    Expected End:  04/26/24               Back Pain       Patient will have a Caruso versus TUG assessment and goal established.       Start:  03/29/24    Expected End:  04/26/24            Patient will be able to maintain tandem stance for 30\" indicating improved static balance to reduce fall risk.        Start:  03/29/24    Expected End:  04/26/24              "

## 2024-05-09 ENCOUNTER — OFFICE VISIT (OUTPATIENT)
Dept: CARDIOLOGY | Facility: HOSPITAL | Age: 89
End: 2024-05-09
Payer: MEDICARE

## 2024-05-09 ENCOUNTER — APPOINTMENT (OUTPATIENT)
Dept: RADIOLOGY | Facility: HOSPITAL | Age: 89
End: 2024-05-09
Payer: MEDICARE

## 2024-05-09 VITALS
HEART RATE: 66 BPM | OXYGEN SATURATION: 97 % | WEIGHT: 104.06 LBS | DIASTOLIC BLOOD PRESSURE: 68 MMHG | SYSTOLIC BLOOD PRESSURE: 174 MMHG | BODY MASS INDEX: 19.66 KG/M2

## 2024-05-09 DIAGNOSIS — I10 PRIMARY HYPERTENSION: ICD-10-CM

## 2024-05-09 DIAGNOSIS — I48.91 ATRIAL FIBRILLATION, UNSPECIFIED TYPE (MULTI): Primary | ICD-10-CM

## 2024-05-09 PROCEDURE — 93005 ELECTROCARDIOGRAM TRACING: CPT | Performed by: INTERNAL MEDICINE

## 2024-05-09 PROCEDURE — 1157F ADVNC CARE PLAN IN RCRD: CPT | Performed by: INTERNAL MEDICINE

## 2024-05-09 PROCEDURE — 3078F DIAST BP <80 MM HG: CPT | Performed by: INTERNAL MEDICINE

## 2024-05-09 PROCEDURE — 1036F TOBACCO NON-USER: CPT | Performed by: INTERNAL MEDICINE

## 2024-05-09 PROCEDURE — 99213 OFFICE O/P EST LOW 20 MIN: CPT | Performed by: INTERNAL MEDICINE

## 2024-05-09 PROCEDURE — 1159F MED LIST DOCD IN RCRD: CPT | Performed by: INTERNAL MEDICINE

## 2024-05-09 PROCEDURE — 1160F RVW MEDS BY RX/DR IN RCRD: CPT | Performed by: INTERNAL MEDICINE

## 2024-05-09 PROCEDURE — 3077F SYST BP >= 140 MM HG: CPT | Performed by: INTERNAL MEDICINE

## 2024-05-09 NOTE — PROGRESS NOTES
Chief Complaint:   Follow-up     History Of Present Illness:    Ramirez Dobbins is a 89 y.o. female presenting for follow-up.  Doing well from a cardiac standpoint.  Is in physical therapy and helps with walking.  Denies any chest pain, shortness of breath, palpitations.  No easy bruising, no recent falls.     Last Recorded Vitals:  Vitals:    05/09/24 1000   BP: 174/68   Pulse: 66   SpO2: 97%   Weight: 47.2 kg (104 lb 0.9 oz)       Past Medical History:  She has a past medical history of Acetabulum fracture, left (Multi) (10/05/2023), Body mass index (BMI) 21.0-21.9, adult (05/24/2022), Cough, unspecified (04/05/2016), Encounter for screening mammogram for malignant neoplasm of breast, Iliotibial band syndrome, left leg (07/01/2020), Other forms of dyspnea (07/08/2021), Pain in unspecified hip (01/02/2015), Personal history of other diseases of the respiratory system (04/29/2015), Personal history of other diseases of the respiratory system (04/20/2016), Personal history of other specified conditions (06/23/2015), Personal history of urinary (tract) infections (09/01/2016), Personal history of urinary (tract) infections (11/15/2013), Sprain of unspecified ligament of left ankle, initial encounter (06/03/2016), and Sprain of unspecified part of unspecified wrist and hand, initial encounter (06/27/2013).    Past Surgical History:  She has a past surgical history that includes Cholecystectomy (05/05/2016); Joint replacement (Left, 2014); and Other surgical history (Left, 04/01/2024).      Social History:  She reports that she has quit smoking. Her smoking use included cigarettes. She has been exposed to tobacco smoke. She has never used smokeless tobacco. She reports that she does not currently use alcohol. She reports that she does not use drugs.    Family History:  Family History   Problem Relation Name Age of Onset    Diabetes Father          Allergies:  Codeine, Nitrofurantoin, Penicillins, Albuterol, and  Sulfamethoxazole-trimethoprim    Outpatient Medications:  Current Outpatient Medications   Medication Instructions    amLODIPine (NORVASC) 5 mg, oral, Daily    apixaban (ELIQUIS) 2.5 mg, oral, Every 12 hours    calcitonin, salmon, (Miacalcin) 200 unit/actuation nasal spray 1 spray, One Nostril, Daily    cholecalciferol (Vitamin D-3) 50 MCG (2000 UT) tablet 1 tablet, oral, Daily    ciprofloxacin-dexamethasone (CiproDEX) otic suspension 4 drops to affected ear/s twice daily for 10 days. 1 bottle. 1 refill.    diclofenac epolamine 1.3 % patch 24 hour 1 patch, transdermal, Daily PRN    famotidine (PEPCID) 20 mg, oral, 2 times daily    fluticasone-umeclidin-vilanter (Trelegy Ellipta) 200-62.5-25 mcg blister with device 1 puff, inhalation, Daily    levalbuterol (Xopenex) 45 mcg/actuation inhaler 1-2 puffs, inhalation, Every 4 hours PRN    melatonin 1 mg tablet,chewable oral    methocarbamol (ROBAXIN) 250 mg, oral, 3 times daily PRN    metoprolol succinate XL (TOPROL-XL) 200 mg, oral, Daily    sennosides-docusate sodium (Rosa Maria-Colace) 8.6-50 mg tablet 1 tablet, oral, Daily    tamoxifen (NOLVADEX) 20 mg, oral, Daily       Physical Exam:  Constitutional:       Appearance: Healthy appearance. Not in distress.   Neck:      Vascular: No JVR. JVD normal.   Pulmonary:      Effort: Pulmonary effort is normal.      Breath sounds: Normal breath sounds. No wheezing. No rhonchi. No rales.   Chest:      Chest wall: Not tender to palpatation.   Cardiovascular:      Normal rate. Regular rhythm.      Murmurs: There is no murmur.   Edema:     Peripheral edema absent.   Abdominal:      General: Bowel sounds are normal.      Palpations: Abdomen is soft.      Tenderness: There is no abdominal tenderness.   Musculoskeletal: Normal range of motion. Skin:     General: Skin is warm and dry.   Neurological:      General: No focal deficit present.      Mental Status: Alert and oriented to person, place and time.           Last Labs:  CBC -  Lab  "Results   Component Value Date    WBC 6.2 12/27/2023    HGB 13.3 12/27/2023    HCT 41.9 12/27/2023    MCV 99 12/27/2023     12/27/2023       CMP -  Lab Results   Component Value Date    CALCIUM 9.5 12/27/2023    PHOS 2.8 06/12/2022    PROT 6.5 12/27/2023    ALBUMIN 4.1 12/27/2023    AST 12 12/27/2023    ALT 8 12/27/2023    ALKPHOS 41 12/27/2023    BILITOT 0.2 12/27/2023       LIPID PANEL -   Lab Results   Component Value Date    CHOL 197 03/01/2022    HDL 45.1 03/01/2022    CHHDL 4.4 03/01/2022       RENAL FUNCTION PANEL -   Lab Results   Component Value Date    GLUCOSE 124 (H) 12/27/2023     12/27/2023    K 4.3 12/27/2023     12/27/2023    CO2 27 12/27/2023    ANIONGAP 12 12/27/2023    BUN 18 12/27/2023    CREATININE 0.85 12/27/2023    CALCIUM 9.5 12/27/2023    PHOS 2.8 06/12/2022    ALBUMIN 4.1 12/27/2023        No results found for: \"BNP\", \"HGBA1C\"    Last Cardiology Tests:  ECG independently reviewed from today: Sinus rhythm, rate 63    Echo 6/2022:   1. The left ventricular systolic function is hyperdynamic with a 70% estimated ejection fraction.   2. Spectral Doppler shows an impaired relaxation pattern of left ventricular diastolic filling.   3. There is mild aortic valve regurgitation.   4. The left ventricular cavity size is decreased.       Echocardiogram 7/28/2021  1. The left ventricular systolic function is normal with a 65-70% estimated  ejection fraction.  2. Spectral Doppler shows a pseudonormal pattern of left ventricular diastolic  filling.  3. Mild aortic valve stenosis.  4. There is mild aortic valve regurgitation.  5. There is mild mitral, tricuspid and pulmonic regurgitation.  6. The estimated pulmonary artery pressure is mildly elevated with the RVSP at  41.7 mmHg.        Stress 5/28/2020:  1. Normal myocardial perfusion study without evidence of ischemia or  prior infarction.  2. The left ventricle is normal in size.  3. Normal LV wall motion with an LV EF estimated at " greater than 65%     Echo 4/7/16:   1. The left ventricular systolic function is normal with a 60-65% ejection  fraction.   2. Spectral Doppler shows an impaired relaxation pattern of left ventricular  diastolic filling.   3. The left atrium is normal in size.   4. The left ventricular cavity size is decreased.   5. There is mild mitral and tricuspid regurgitation.   6. The estimated pulmonary artery pressure is mildly elevated with the RVSP at  43mmHg.     5/28/2020 stress test:  Negative for ischemia    Assessment/Plan   Very pleasant 89 yo female with h/o PAF on apixaban, hypertension, COPD, and left acetabular fracture treated non-surgically 6/2022, right breast mass presenting today for routine follow up.   Doing well from a cardiac standpoint.  Blood pressure controlled at home (Bps 130/70s), has been presumedly maintaining sinus rhythm.  Plan to continue current apixaban, amlodipine, Toprol-XL.  Follow-up in one year or sooner if needed.       Blayne Page MD

## 2024-05-10 ENCOUNTER — TREATMENT (OUTPATIENT)
Dept: PHYSICAL THERAPY | Facility: CLINIC | Age: 89
End: 2024-05-10
Payer: MEDICARE

## 2024-05-10 DIAGNOSIS — M54.9 BACK PAIN: ICD-10-CM

## 2024-05-10 DIAGNOSIS — M47.818 SI JOINT ARTHRITIS: ICD-10-CM

## 2024-05-10 PROCEDURE — 97530 THERAPEUTIC ACTIVITIES: CPT | Mod: GP,KX | Performed by: PHYSICAL THERAPIST

## 2024-05-10 NOTE — PROGRESS NOTES
Physical Therapy    Physical Therapy Treatment/Recheck/Discharge    Patient Name: Ramirez Dobbins  MRN: [id number]  Today's Date: 5/10/2024  Time Calculation  Start Time: 1245  Stop Time: 1330  Time Calculation (min): 45 min  Onset Date:12/15/23  Insurance: No Auth/Medical Necessity  Visit Number:18  Start/Stop Time: [id number]      Assessment: The patient has participated in long course of therapy working on LE and postural strength, and balance. She has made very good progress, especially after having the trigger point injections.  She continues to have back and left leg pain, intense at times, particularly in the morning upon rising, but exercise and walking improves her pain. She walks daily with her walker up to 1 mile.  She has walked a little without her a.d, but is not consistently demonstrating good gait pattern and stability so this is not recommended.  She demonstrates greatly improved posture and ease of performing sit to stand transfers.   She continues to demonstrate some weakness in the left hip, but has a very good home program to address all remaining strength deficits. At this point, she is ready for discharge to home program, as she understands the importance of adherence to HEP.  She may also benefit from arthritis classes at the [facility].      Plan: Discharge to [facility]       Current Problem  1. SI joint arthritis  Follow Up In Physical Therapy      2. Back pain  Follow Up In Physical Therapy          General:  Reason for Referral: SI joint arthritis  Referred By: Dr. Mullins  Past Medical History Relevant to Rehab: Fall with break in pelvis, fall with fracture of T10 (per patient), Breast CA, remote left NESS            Subjective  For balance, she feels more steady, able to perform sit to stand much easier.  When she leaves therapy, she generally feels much better. She continues to have back pain, but can exercise and make it feel better.  Only uses Tylenol daily and uses a pain patch occasionally. Only  "has pain on the left side of lower back and today feels sore in left quadriceps.  Trigger point injections eliminated her right sided back pain. Wore her gym shoes with heel lift yesterday and they felt very comfortable.     Precautions:  Breast CA, monitoring radiographically, Falls, left NESS, hx of pelvic fracture, osteoporosis, Xray showing loss of height at T11, STEADi 9         Pain: back pain 4/10, up to 8/10 in back and left leg with muscle cramps.        Objective   Outcome Measures:  Modified Oswestry: 28/50, 56%  Treatments:  Therapeutic Activity: Reassessment measurements, VIDHI completion, final HEP  LE/Lumbopelvic Strength:  Hip Flex: R:4+ L:4-  Knee Ext: R:4+ L:4+  Ankle DF: R:5 L:4+  Bilateral Bridge: 100%  Transverse Abdominis/Sahrmann Level: good baseline contraction  Hip Abduction: R:4 L:3+/4-  Knee Flexion: R:   5            L:5    TU.04 seconds with RW  TU. 74 seconds without a.d., asymmetric step length.     Tandem stance:  Right in front, 1st: 5\", 2nd: 30\"  Left in front, 1st 30\"     OP EDUCATION: Final HEP updated and printed handout and therabands were provided.   Access Code: [email or web address]  URL: [email or web address]  Date: 05/10/2024  Prepared by: Michelle Kapoor    Exercises  - Seated Shoulder Row with Anchored Resistance  - 1 x daily - 7 x weekly - 2 sets - 10 reps  - Seated Shoulder Extension and Scapular Retraction with Resistance  - 1 x daily - 7 x weekly - 2 sets - 10 reps  - Seated Scapular Protraction and Retraction with Dowel  - 1 x daily - 7 x weekly - 2 sets - 10 reps       Goals:  Physical Therapy - Physical Therapy - 2023 Problems       Physical Therapy - Physical Therapy - 2023 Problems (Resolved)       Balance       Pt will reduce TUG score by at least 3 sec to decrease the risk of falls. (Met)       Start:  10/03/23    Expected End:  23    Resolved:  10/23/23            PT Problem       Pt will improve Romberg score by 2/6 score " "(Met)       Start:  10/03/23    Expected End:  11/13/23    Resolved:  10/23/23         Pt will improve gait mechanics evidenced by equal step length, increased WBing LLE, and LRAD in order to participate in ADL, IADL, work, and leisure skills   (Met)       Start:  10/03/23    Expected End:  11/13/23    Resolved:  10/23/23         Pt will increase strength of BLE musculature by at least 1 mm grade to participate in ADL, IADL, work, and leisure skills   (Not Progressing)       Start:  10/03/23    Expected End:  11/13/23    Resolved:  11/29/23         Pt will improve \"AMPAC\" score to at least 30 for increased independence and ease with ADL/IADL's, skills, and work/leisure activities.   (Not Progressing)       Start:  10/03/23    Expected End:  11/13/23    Resolved:  11/29/23      Goal Note       NOT MET               Pt will improve stair negotiation by use of a reciprocal pattern with single use of HR to be able to negotiate different levels of home   (Met)       Start:  10/03/23    Expected End:  11/13/23    Resolved:  10/23/23         Pt will ambulate short household sitances ~50' with use of straight cane without LOB, mod I, equal WBing through LE's, and proper aurea to improve independence of mobility within home (Not Progressing)       Start:  10/23/23    Expected End:  12/18/23    Resolved:  11/29/23                  Physical Therapy 1/24/24 Problems       Physical Therapy 1/24/24 Problems (Resolved)       Back Pain       Patient will be independent and adherent to HEP to enhance functional progress and long term management of condition.  (Met)       Start:  01/26/24    Expected End:  03/08/24    Resolved:  03/29/24         Patient will improved Modified Oswestry score by 13 points indicating meaningful clinical change in function due to reduction of back pain. (Met)       Start:  01/26/24    Expected End:  03/08/24    Resolved:  03/29/24         Patient will report reduction of back pain by 50% or greater to " "ease performance of functional mobility and IADLs (Adequate for Discharge)       Start:  01/26/24    Expected End:  04/26/24            Patient will increase LE and trunk strength by 1/3 MMT grade to facilitate muscle performance necessary for maintaining symmetrical postural alignement and functional mobility. (Adequate for Discharge)       Start:  01/26/24    Expected End:  04/26/24            Patient will ambulate household distances with cane with mod I with no evidence of LOB or instability to improve independence in her home.  (Adequate for Discharge)       Start:  01/26/24    Expected End:  04/26/24            Patient will demonstrate Good static and dynamic standing balance.  (Met)       Start:  01/26/24    Expected End:  04/26/24    Resolved:  05/10/24            Back Pain       Patient will have a Caruso versus TUG assessment and goal established. (Met)       Start:  03/29/24    Expected End:  04/26/24    Resolved:  05/10/24         Patient will be able to maintain tandem stance for 30\" indicating improved static balance to reduce fall risk.  (Met)       Start:  03/29/24    Expected End:  04/26/24    Resolved:  05/10/24              "

## 2024-05-21 PROBLEM — R00.1 BRADYCARDIA: Status: RESOLVED | Noted: 2023-10-05 | Resolved: 2024-05-21

## 2024-05-23 ENCOUNTER — OFFICE VISIT (OUTPATIENT)
Dept: PRIMARY CARE | Facility: CLINIC | Age: 89
End: 2024-05-23
Payer: MEDICARE

## 2024-05-23 VITALS
DIASTOLIC BLOOD PRESSURE: 52 MMHG | OXYGEN SATURATION: 97 % | WEIGHT: 106.6 LBS | TEMPERATURE: 98.2 F | HEIGHT: 61 IN | HEART RATE: 67 BPM | SYSTOLIC BLOOD PRESSURE: 140 MMHG | BODY MASS INDEX: 20.12 KG/M2

## 2024-05-23 DIAGNOSIS — I73.9 PVD (PERIPHERAL VASCULAR DISEASE) (CMS-HCC): ICD-10-CM

## 2024-05-23 DIAGNOSIS — R09.81 NASAL CONGESTION: Primary | ICD-10-CM

## 2024-05-23 DIAGNOSIS — I48.0 PAROXYSMAL ATRIAL FIBRILLATION (MULTI): ICD-10-CM

## 2024-05-23 PROCEDURE — 99214 OFFICE O/P EST MOD 30 MIN: CPT | Performed by: FAMILY MEDICINE

## 2024-05-23 PROCEDURE — 1125F AMNT PAIN NOTED PAIN PRSNT: CPT | Performed by: FAMILY MEDICINE

## 2024-05-23 PROCEDURE — 1159F MED LIST DOCD IN RCRD: CPT | Performed by: FAMILY MEDICINE

## 2024-05-23 PROCEDURE — 1158F ADVNC CARE PLAN TLK DOCD: CPT | Performed by: FAMILY MEDICINE

## 2024-05-23 PROCEDURE — 1036F TOBACCO NON-USER: CPT | Performed by: FAMILY MEDICINE

## 2024-05-23 PROCEDURE — 3077F SYST BP >= 140 MM HG: CPT | Performed by: FAMILY MEDICINE

## 2024-05-23 PROCEDURE — 1123F ACP DISCUSS/DSCN MKR DOCD: CPT | Performed by: FAMILY MEDICINE

## 2024-05-23 PROCEDURE — 1160F RVW MEDS BY RX/DR IN RCRD: CPT | Performed by: FAMILY MEDICINE

## 2024-05-23 PROCEDURE — 1157F ADVNC CARE PLAN IN RCRD: CPT | Performed by: FAMILY MEDICINE

## 2024-05-23 PROCEDURE — 3078F DIAST BP <80 MM HG: CPT | Performed by: FAMILY MEDICINE

## 2024-05-23 RX ORDER — FLUTICASONE PROPIONATE 50 MCG
1 SPRAY, SUSPENSION (ML) NASAL DAILY
Qty: 16 G | Refills: 5 | Status: SHIPPED | OUTPATIENT
Start: 2024-05-23 | End: 2025-05-23

## 2024-05-23 ASSESSMENT — PAIN SCALES - GENERAL: PAINLEVEL: 4

## 2024-05-23 ASSESSMENT — PATIENT HEALTH QUESTIONNAIRE - PHQ9
SUM OF ALL RESPONSES TO PHQ9 QUESTIONS 1 & 2: 0
1. LITTLE INTEREST OR PLEASURE IN DOING THINGS: NOT AT ALL
2. FEELING DOWN, DEPRESSED OR HOPELESS: NOT AT ALL

## 2024-05-23 ASSESSMENT — ENCOUNTER SYMPTOMS
OCCASIONAL FEELINGS OF UNSTEADINESS: 1
LOSS OF SENSATION IN FEET: 1
DEPRESSION: 0

## 2024-05-23 NOTE — PATIENT INSTRUCTIONS
Here for follow up    For nose - use flonase 2 sprays once a day.  If irritated stop salmon spray for 1- 2weeks, then restart.  When nasal congestion opens, stop flonase and use as needed    For leg pain - concern for vascular disease.  Recommend PVR testing.  If abnormal referral to vascular specialist.  If normal likely pain from back.     Follow up in 6 months for physical.

## 2024-05-23 NOTE — PROGRESS NOTES
"Subjective   Patient ID: Ramirez Dobbins is a 89 y.o. female who presents for 3 month follow up.    Here to establish.  Living with son and daughter in law.  Living in - in law suite and walker.      Rhinorrhea/Nasal congestion  -Pt was given salmon spray from pulmonology - helped initially.  Patient feels like she cannot breath through her nose.  Increased mucus.  Rhinorrhea at times.      Left ear  -Pt had polyp removed from Dr. Patel.     Left hand:  -Pt has strange sensation in the left hand.  Finger tips.  Present for a \"long time\".  Pt is dropping items frequently    Leg cramps  -Muscle cramps in bilateral legs - worse in the left leg. Thigh and calf.  Occurring every morning.  Walking around improves pain.  Muscle cramps are frequent.  Pt tried magnesium - no improvement.  Had injections - no benefit.     Atrial Fibrillation  -F/U:  Stable.  No recent issues. On metoprolol  -Anticoagulation: Eliquis - no bleeding   -Cardiologist:  Dr. Page.      Low back pain  -hx of compression fractures.  Fall last year.  Scoliosis.  Using lidocaine patches.  Robaxin at night.  Patient trying to avoid narcotic therapy.  Located in the low back.  Appt with PM&R.  Was on fosamax for 5 years.  No medication after.  Hx of multiple fractures.     Cancer History:  -Type: Breast Cancer  -Stage:  -Oncologist: Dr. Umaña  -F/U: Recently diagnosed last June - tolerating tamoxifen.  No hot flashes.  Repeat images.               Review of Systems    Objective   /52   Pulse 67   Temp 36.8 °C (98.2 °F)   Ht 1.549 m (5' 1\")   Wt 48.4 kg (106 lb 9.6 oz)   SpO2 97%   BMI 20.14 kg/m²     Physical Exam  Vitals reviewed.   Constitutional:       General: She is not in acute distress.  HENT:      Right Ear: Tympanic membrane and ear canal normal.      Left Ear: Tympanic membrane and ear canal normal. Tympanic membrane is not erythematous.      Nose: Rhinorrhea present.      Right Turbinates: Swollen.      Left Turbinates: Swollen. "   Cardiovascular:      Rate and Rhythm: Normal rate and regular rhythm.   Pulmonary:      Effort: Pulmonary effort is normal.      Breath sounds: No wheezing or rhonchi.   Musculoskeletal:      Right lower leg: No edema.      Left lower leg: No edema.      Comments: Using rollator   Lymphadenopathy:      Cervical: No cervical adenopathy.   Skin:     Capillary Refill: Left foot - cool foot, TP 0/4, DP 1/4, cap refill > 3 sec, no wounds  Neurological:      Mental Status: She is alert.   Psychiatric:         Mood and Affect: Mood normal.         Behavior: Behavior normal.         Assessment/Plan   Diagnoses and all orders for this visit:  Nasal congestion  -     fluticasone (Flonase) 50 mcg/actuation nasal spray; Administer 1 spray into each nostril once daily. Shake gently. Before first use, prime pump. After use, clean tip and replace cap.  PVD (peripheral vascular disease) (CMS-[facility])  -     Vascular US PVR without exercise; Future  Other orders  -     Follow Up In Primary Care - Medicare Annual; Future    Patient Instructions   Here for follow up    For nose - use flonase 2 sprays once a day.  If irritated stop salmon spray for 1- 2weeks, then restart.  When nasal congestion opens, stop flonase and use as needed    For leg pain - concern for vascular disease.  Recommend PVR testing.  If abnormal referral to vascular specialist.  If normal likely pain from back.     Follow up in 6 months for physical.

## 2024-05-24 DIAGNOSIS — K21.9 GASTROESOPHAGEAL REFLUX DISEASE WITHOUT ESOPHAGITIS: ICD-10-CM

## 2024-05-24 RX ORDER — AMLODIPINE BESYLATE 5 MG/1
5 TABLET ORAL DAILY
Qty: 90 TABLET | Refills: 1 | Status: SHIPPED | OUTPATIENT
Start: 2024-05-24

## 2024-05-28 ENCOUNTER — APPOINTMENT (OUTPATIENT)
Dept: PRIMARY CARE | Facility: CLINIC | Age: 89
End: 2024-05-28
Payer: MEDICARE

## 2024-05-30 ENCOUNTER — APPOINTMENT (OUTPATIENT)
Dept: PHYSICAL MEDICINE AND REHAB | Facility: CLINIC | Age: 89
End: 2024-05-30
Payer: MEDICARE

## 2024-06-03 ENCOUNTER — HOSPITAL ENCOUNTER (OUTPATIENT)
Dept: VASCULAR MEDICINE | Facility: HOSPITAL | Age: 89
Discharge: HOME | End: 2024-06-03
Payer: MEDICARE

## 2024-06-03 DIAGNOSIS — I73.9 PVD (PERIPHERAL VASCULAR DISEASE) (CMS-HCC): ICD-10-CM

## 2024-06-03 PROCEDURE — 93922 UPR/L XTREMITY ART 2 LEVELS: CPT

## 2024-06-03 PROCEDURE — 93922 UPR/L XTREMITY ART 2 LEVELS: CPT | Performed by: SURGERY

## 2024-06-04 ENCOUNTER — TELEPHONE (OUTPATIENT)
Dept: PRIMARY CARE | Facility: CLINIC | Age: 89
End: 2024-06-04
Payer: MEDICARE

## 2024-06-04 DIAGNOSIS — I73.9 PAD (PERIPHERAL ARTERY DISEASE) (CMS-HCC): ICD-10-CM

## 2024-06-04 NOTE — TELEPHONE ENCOUNTER
Left message informing patient of results and that referral was put in.  Advised patient that mychart message would be sent with results and referral contact information.

## 2024-06-10 LAB
ATRIAL RATE: 63 BPM
P AXIS: 59 DEGREES
P OFFSET: 181 MS
P ONSET: 143 MS
PR INTERVAL: 156 MS
Q ONSET: 221 MS
QRS COUNT: 10 BEATS
QRS DURATION: 80 MS
QT INTERVAL: 398 MS
QTC CALCULATION(BAZETT): 407 MS
QTC FREDERICIA: 404 MS
R AXIS: 65 DEGREES
T AXIS: 66 DEGREES
T OFFSET: 420 MS
VENTRICULAR RATE: 63 BPM

## 2024-06-27 ENCOUNTER — HOSPITAL ENCOUNTER (OUTPATIENT)
Dept: RADIOLOGY | Facility: HOSPITAL | Age: 89
Discharge: HOME | End: 2024-06-27
Payer: MEDICARE

## 2024-06-27 VITALS — HEIGHT: 61 IN | WEIGHT: 102 LBS | BODY MASS INDEX: 19.26 KG/M2

## 2024-06-27 DIAGNOSIS — Z17.0 MALIGNANT NEOPLASM OF UPPER-OUTER QUADRANT OF RIGHT BREAST IN FEMALE, ESTROGEN RECEPTOR POSITIVE (MULTI): ICD-10-CM

## 2024-06-27 DIAGNOSIS — C50.411 MALIGNANT NEOPLASM OF UPPER-OUTER QUADRANT OF RIGHT BREAST IN FEMALE, ESTROGEN RECEPTOR POSITIVE (MULTI): ICD-10-CM

## 2024-06-27 PROCEDURE — 76982 USE 1ST TARGET LESION: CPT | Mod: RT

## 2024-06-27 PROCEDURE — 77062 BREAST TOMOSYNTHESIS BI: CPT

## 2024-06-27 PROCEDURE — 76642 ULTRASOUND BREAST LIMITED: CPT | Mod: RT

## 2024-07-02 ENCOUNTER — OFFICE VISIT (OUTPATIENT)
Dept: HEMATOLOGY/ONCOLOGY | Facility: CLINIC | Age: 89
End: 2024-07-02
Payer: MEDICARE

## 2024-07-02 VITALS
WEIGHT: 105.6 LBS | SYSTOLIC BLOOD PRESSURE: 164 MMHG | HEART RATE: 60 BPM | BODY MASS INDEX: 19.95 KG/M2 | RESPIRATION RATE: 16 BRPM | DIASTOLIC BLOOD PRESSURE: 51 MMHG | TEMPERATURE: 96.8 F | OXYGEN SATURATION: 90 %

## 2024-07-02 DIAGNOSIS — C50.411 MALIGNANT NEOPLASM OF UPPER-OUTER QUADRANT OF RIGHT BREAST IN FEMALE, ESTROGEN RECEPTOR POSITIVE (MULTI): ICD-10-CM

## 2024-07-02 DIAGNOSIS — C50.911 MALIGNANT NEOPLASM OF RIGHT FEMALE BREAST, UNSPECIFIED ESTROGEN RECEPTOR STATUS, UNSPECIFIED SITE OF BREAST (MULTI): Primary | ICD-10-CM

## 2024-07-02 DIAGNOSIS — Z17.0 MALIGNANT NEOPLASM OF UPPER-OUTER QUADRANT OF RIGHT BREAST IN FEMALE, ESTROGEN RECEPTOR POSITIVE (MULTI): ICD-10-CM

## 2024-07-02 LAB
ALBUMIN SERPL BCP-MCNC: 4 G/DL (ref 3.4–5)
ALP SERPL-CCNC: 30 U/L (ref 33–136)
ALT SERPL W P-5'-P-CCNC: 5 U/L (ref 7–45)
ANION GAP SERPL CALC-SCNC: 11 MMOL/L (ref 10–20)
AST SERPL W P-5'-P-CCNC: 11 U/L (ref 9–39)
BASOPHILS # BLD AUTO: 0.02 X10*3/UL (ref 0–0.1)
BASOPHILS NFR BLD AUTO: 0.3 %
BILIRUB SERPL-MCNC: 0.3 MG/DL (ref 0–1.2)
BUN SERPL-MCNC: 12 MG/DL (ref 6–23)
CALCIUM SERPL-MCNC: 9.2 MG/DL (ref 8.6–10.3)
CHLORIDE SERPL-SCNC: 102 MMOL/L (ref 98–107)
CO2 SERPL-SCNC: 26 MMOL/L (ref 21–32)
CREAT SERPL-MCNC: 0.71 MG/DL (ref 0.5–1.05)
EGFRCR SERPLBLD CKD-EPI 2021: 80 ML/MIN/1.73M*2
EOSINOPHIL # BLD AUTO: 0.07 X10*3/UL (ref 0–0.4)
EOSINOPHIL NFR BLD AUTO: 1.1 %
ERYTHROCYTE [DISTWIDTH] IN BLOOD BY AUTOMATED COUNT: 12.8 % (ref 11.5–14.5)
GLUCOSE SERPL-MCNC: 129 MG/DL (ref 74–99)
HCT VFR BLD AUTO: 42.3 % (ref 36–46)
HGB BLD-MCNC: 13.9 G/DL (ref 12–16)
IMM GRANULOCYTES # BLD AUTO: 0.03 X10*3/UL (ref 0–0.5)
IMM GRANULOCYTES NFR BLD AUTO: 0.5 % (ref 0–0.9)
LYMPHOCYTES # BLD AUTO: 1.23 X10*3/UL (ref 0.8–3)
LYMPHOCYTES NFR BLD AUTO: 19.3 %
MCH RBC QN AUTO: 31.4 PG (ref 26–34)
MCHC RBC AUTO-ENTMCNC: 32.9 G/DL (ref 32–36)
MCV RBC AUTO: 96 FL (ref 80–100)
MONOCYTES # BLD AUTO: 1.88 X10*3/UL (ref 0.05–0.8)
MONOCYTES NFR BLD AUTO: 29.5 %
NEUTROPHILS # BLD AUTO: 3.15 X10*3/UL (ref 1.6–5.5)
NEUTROPHILS NFR BLD AUTO: 49.3 %
PLATELET # BLD AUTO: 195 X10*3/UL (ref 150–450)
POTASSIUM SERPL-SCNC: 4 MMOL/L (ref 3.5–5.3)
PROT SERPL-MCNC: 6.8 G/DL (ref 6.4–8.2)
RBC # BLD AUTO: 4.43 X10*6/UL (ref 4–5.2)
SODIUM SERPL-SCNC: 135 MMOL/L (ref 136–145)
WBC # BLD AUTO: 6.4 X10*3/UL (ref 4.4–11.3)

## 2024-07-02 PROCEDURE — 99214 OFFICE O/P EST MOD 30 MIN: CPT | Performed by: PHYSICIAN ASSISTANT

## 2024-07-02 PROCEDURE — 1159F MED LIST DOCD IN RCRD: CPT | Performed by: PHYSICIAN ASSISTANT

## 2024-07-02 PROCEDURE — 3077F SYST BP >= 140 MM HG: CPT | Performed by: PHYSICIAN ASSISTANT

## 2024-07-02 PROCEDURE — 1157F ADVNC CARE PLAN IN RCRD: CPT | Performed by: PHYSICIAN ASSISTANT

## 2024-07-02 PROCEDURE — 1126F AMNT PAIN NOTED NONE PRSNT: CPT | Performed by: PHYSICIAN ASSISTANT

## 2024-07-02 PROCEDURE — 3078F DIAST BP <80 MM HG: CPT | Performed by: PHYSICIAN ASSISTANT

## 2024-07-02 PROCEDURE — 36415 COLL VENOUS BLD VENIPUNCTURE: CPT | Performed by: PHYSICIAN ASSISTANT

## 2024-07-02 ASSESSMENT — PAIN SCALES - GENERAL: PAINLEVEL: 0-NO PAIN

## 2024-07-02 NOTE — PROGRESS NOTES
Patient ID: Ramirez Dobbins is a 89 y.o. female.  Referring Physician: Luis Umaña MD  [id number]  [place] [id number]  Primary Care Provider: Jose Schafer DO      Subjective    HPI    Chief Complaint: evaluation for breast cancer   Interval History:    Referred by Dr. Rosas      Reason for referral: breast cancer      HPI     89 year old woman with hx of A. fib on Eliquis, COPD, GERD, osteoporosis, hypertension, vit D deficiency, left bipolar hip hemiarthroplasty  who presented to the hospital in June 2022 after mechanical ground-level fall with left hip/groin pain.    CT scans of spine and C/A/P on Jun 6th, 2022 showed Acute comminuted left pelvic fracture extending to the left acetabulum,  anterior column and left iliac fossa. However it showed a somewhat speculated mass in the right breast measuring 1.2 x 2.1 x 1.8 cm. she had PT/OT and no immediate ortho interventions.   She then had work up for the breast mass in May 2023      -5/18/23 US and diagnostic MMG of the b/l breast: right breast mass at 12 o’clock position measuring 2.6 x 2.0 x 1.7 cm in size, no pathologically enlarged LN   -bx on 5/18/23 showed IDC grade 2, ER positive (>95%), GA positive (>95%) and Her2 negative (1+ IHC)   -seen with Dr. Rosas   -CT scans of C/A/P 6/7/23: no metastatic disease, mass measuring 2.6 x 1,6 cm   -CT scans in ER on 8/21/23 of C/T/L spine and C/A/P following another fall: mild compression deformity of T10 spine, right breast mass       she does not want to have surgery   she has pain in the back from her fall and   she is using tylenol and lidocaine patches   it is improved with movement   able to ambulate with her walker   she is on calcium-vit D   she tried alendronate in the past   she knew she had a breast   she can feel the breast lump which is enlarging per patient and she thinks she has some pain in the left breast   she used heating pad to improve, it is intermittent and  worsened , she does not feel the lump   not eating well since the fall   prior to that she was pretty active   no nausea or vomiting     she has started Tamoxifen 8/30/2023 with good tolerance.    PAST MEDICAL HISTORY:   A. fib on Eliquis, COPD, GERD, osteoporosis, hypertension, vit D deficiency, left bipolar hip hemiarthroplasty who presented to the hospital  in June 2022 after mechanical ground-level fall with left hip/groin pain.  hysterectomy   cholecystostomy      SOCIAL HISTORY:   former smoker, quit 20 yrs ago   no alcohol   she owned ice cream selling business   she has 3 children      FAMILY HISTORY:    sister had breast cancer, she was not at a young age   No other specific history of bleeding, clotting or malignant disorder in the family.    Interval History:  Patient presents for follow up accompanied by her FM. On assessment, no new complaints or issues. She continues on Tamoxifen, tolerating well. No new falls. He had injection to her SI joint which seemed to help. Eating and drinking well. Breast mass similar. Walks with walker. Manny hot flashes, No LE swelling or pain, n/v/d/abdpain, bleeding from any source or any other complaints at this time.     REVIEW OF SYSTEMS:  Pertinent finding as per the history above.  There are no additional specific symptoms pertaining to eyes, ENT, hematologic, lymphatic, neurological, psychiatric, cardiac,  pulmonary, GI, , endocrine, rheumatic, dermatological, or musculoskeletal systems.  All other systems have been reviewed and generally negative and noncontributory.     PHYSICAL EXAM:  sister had breast cancer, she was not at a young age   No other specific history of bleeding, clotting or malignant disorder in the family.  GENERAL:  Age-appropriate, in no acute discomfort.    VITAL SIGNS:  Reviewed in the EMR  HEENT:  Normocephalic and atraumatic.  Mucous membranes are moist. No oral lesions.    NECK:  Supple without lymphadenopathy.  No thyromegaly or bruits.   "  CHEST:  Clear to auscultation bilaterally.    HEART:  irregular,  No gallop, rub, or murmur.    ABDOMEN:  Soft, nontender, and nondistended. No hepatosplenomegaly.    EXTREMITIES:  No cyanosis, clubbing, or edema.  NEUROLOGICAL:  Alert, awake, and oriented.  No gross focal deficit.  LYMPHATICS: No significant lymphadenopathy.  BREAST: right breast mass in upper outer quadrant around with dimpling of skin and retraction of nipple, seemed softer on exam today.         4/12/2024    11:05 AM 4/15/2024    12:02 PM 4/16/2024     2:23 PM 5/9/2024    10:00 AM 5/23/2024    12:55 PM 6/27/2024     2:00 PM 7/2/2024    12:53 PM   Vitals   Systolic 150 116  174 140  164   Diastolic 64 62  68 52  51   Heart Rate  70  66 67  60   Temp 36 °C (96.8 °F)    36.8 °C (98.2 °F)  36 °C (96.8 °F)   Resp       16   Height (in) 1.569 m (5' 1.77\") 1.549 m (5' 1\") 1.549 m (5' 1\")  1.549 m (5' 1\") 1.549 m (5' 1\")    Weight (lb) 109 109 105 104.06 106.6 102 105.6   BMI 20.09 kg/m2 20.6 kg/m2 19.84 kg/m2 19.66 kg/m2 20.14 kg/m2 19.27 kg/m2 19.95 kg/m2   BSA (m2) 1.47 m2 1.46 m2 1.43 m2 1.43 m2 1.44 m2 1.41 m2 1.44 m2   Visit Report Report Report Report Report Report  Report     Performance Status:  Symptomatic; in bed <50% of the day    BI mammo bilateral diagnostic tomosynthesis    Result Date: 6/27/2024  Interpreted By:  Tonya Cortes, STUDY: BI MAMMO BILATERAL DIAGNOSTIC TOMOSYNTHESIS; BI US BREAST LIMITED RIGHT;  6/27/2024 2:12 pm; 6/27/2024 2:38 pm   ACCESSION NUMBER(S): [id number]; [id number]   ORDERING CLINICIAN: CHARLOTTE STEWART   INDICATION: Signs/Symptoms:follow up known right breast cancer; Signs/Symptoms:measurement of known right breast mass, assess response.   COMPARISON: Mammogram from 12/19/2023 and ultrasound from 05/18/2023.   FINDINGS: MAMMOGRAPHY: 2D and tomosynthesis images were reviewed at 1 mm slice thickness. Spot compression magnification views of the left breast in the area of microcalcifications.   Density:  The " breast tissue is heterogeneously dense, which may obscure small masses.   Spiculated mass similar to previous exam adjacent to biopsy marker clip. Associated postoperative changes including skin thickening and skin retraction adjacent to the nipple similar to prior. There is a small group of heterogeneous microcalcifications within the lateral aspect of the left breast at posterior depth. No branching or linearity. No layering identified on mediolateral projection. No additional suspicious masses or calcifications are identified bilaterally.   ULTRASOUND:  Targeted ultrasound of the right breast was performed and compared to previous exam. At 12 o'clock position, 3 cm from the nipple there is a hypoechoic mass with extensive posterior shadowing and associated desmoplastic reaction measuring approximately 14 x 14 x 17 mm in size, unchanged since prior. No additional cystic or solid lesions.       1. Known biopsy-proven malignancy in the right breast similar to prior, stable.   2. 5 x 6 mm group of microcalcifications in the left breast, low but suspicious for malignancy. Stereotactic guided biopsy recommended.   BI-RADS CATEGORY:   BI-RADS CATEGORY:  6 Known Biopsy-Proven Malignancy. Recommendation:  Immediate Follow-up. Recommended Date:  Immediate. Laterality:  Bilateral.   For any future breast imaging appointments, please call [phone]).     MACRO: Critical Finding:  See findings. Notification was initiated on 6/27/2024 at 3:46 pm by  Tonya Cortes.  (**-YCF-**) Instructions:   Signed by: Tonya Cortes 6/27/2024 3:46 PM Dictation workstation:   [id number]    BI US breast limited right    Result Date: 6/27/2024  Interpreted By:  Tonya Cortes, STUDY: BI MAMMO BILATERAL DIAGNOSTIC TOMOSYNTHESIS; BI US BREAST LIMITED RIGHT;  6/27/2024 2:12 pm; 6/27/2024 2:38 pm   ACCESSION NUMBER(S): [id number]; [id number]   ORDERING CLINICIAN: CHARLOTTE STEWART   INDICATION: Signs/Symptoms:follow up known right breast  cancer; Signs/Symptoms:measurement of known right breast mass, assess response.   COMPARISON: Mammogram from 12/19/2023 and ultrasound from 05/18/2023.   FINDINGS: MAMMOGRAPHY: 2D and tomosynthesis images were reviewed at 1 mm slice thickness. Spot compression magnification views of the left breast in the area of microcalcifications.   Density:  The breast tissue is heterogeneously dense, which may obscure small masses.   Spiculated mass similar to previous exam adjacent to biopsy marker clip. Associated postoperative changes including skin thickening and skin retraction adjacent to the nipple similar to prior. There is a small group of heterogeneous microcalcifications within the lateral aspect of the left breast at posterior depth. No branching or linearity. No layering identified on mediolateral projection. No additional suspicious masses or calcifications are identified bilaterally.   ULTRASOUND:  Targeted ultrasound of the right breast was performed and compared to previous exam. At 12 o'clock position, 3 cm from the nipple there is a hypoechoic mass with extensive posterior shadowing and associated desmoplastic reaction measuring approximately 14 x 14 x 17 mm in size, unchanged since prior. No additional cystic or solid lesions.       1. Known biopsy-proven malignancy in the right breast similar to prior, stable.   2. 5 x 6 mm group of microcalcifications in the left breast, low but suspicious for malignancy. Stereotactic guided biopsy recommended.   BI-RADS CATEGORY:   BI-RADS CATEGORY:  6 Known Biopsy-Proven Malignancy. Recommendation:  Immediate Follow-up. Recommended Date:  Immediate. Laterality:  Bilateral.   For any future breast imaging appointments, please call [phone]).     MACRO: Critical Finding:  See findings. Notification was initiated on 6/27/2024 at 3:46 pm by  Tonya Cortes.  (**-YCF-**) Instructions:   Signed by: Tonya Cortes 6/27/2024 3:46 PM Dictation workstation:    [id number]    Vascular US ankle brachial index (MANAV) without exercise    Result Date: 6/3/2024          [facility] [id number] [address]  Tel [phone] and Fax [phone]  Vascular Lab Report [facility] US ANKLE BRACHIAL INDEX (MANAV) WITHOUT EXERCISE  Patient Name:     NINO MAYORGA       Reading           [id number] [address]                                       Physician:        MD Study Date:       6/3/2024            Ordering          [id number] [address]                                       Physician: MRN/PID:          [id number]            Technologist:     Mala Das T Accession#:       [id number]        Technologist 2:   Margret Maher Date of           6/10/1933 / 89      Encounter#:       [id number] Birth/Age:        years Gender:           F Admission Status: Outpatient          Location          [facility]                                       Performed:  Diagnosis/ICD: Peripheral vascular disease, unspecified-I73.9 CPT Codes:     01170 Peripheral artery MANAV Only  CONCLUSIONS: Right Lower PVR: No evidence of arterial occlusive disease in the right lower extremity at rest. Decreased digital perfusion noted. Multiphasic flow is noted in the right common femoral artery, right dorsalis pedis artery and right posterior tibial artery. Left Lower PVR: Evidence of mild arterial occlusive disease in the left lower extremity at rest. Decreased digital perfusion noted. Multiphasic flow is noted in the left common femoral artery, left posterior tibial artery and left dorsalis pedis artery.  Imaging & Doppler Findings:  RIGHT Lower PVR                Pressures Ratios Right Posterior Tibial (Ankle) 145 mmHg  0.90 Right Dorsalis Pedis (Ankle)   147 mmHg  0.91 Right Digit (Great Toe)        69 mmHg   0.43   LEFT Lower PVR                Pressures Ratios Left Posterior Tibial (Ankle) 139 mmHg  0.86 Left Dorsalis Pedis (Ankle)   142 mmHg  0.88 Left Digit (Great Toe)         61 mmHg   0.38                     Right     Left Brachial Pressure 154 mmHg 162 mmHg   [id number] Matteo Peters MD Electronically signed by [id number] Matteo Peters MD on 6/3/2024 at 10:27:17 AM  ** Final **        Assessment/Plan      1. IDC of right breast      ER positive, DE positive, Her-2 negative   [id number]   patient does not want to proceed with surgery   we discussed endocrine therapy today, discussed that this is not for curative purposes, she understands that   options for Tamoxifen and anastrozole discussed, pros and cons discussed   since she has already osteoporosis and hx of falls including recent one with compression fx , will give Tamoxifen   start Esparza 20 mg a day  she has hx of hysterectomy and she is already on Eliquis      9/27- continue Tamoxifen tolerated at this time   we will restage with US in 3 months, has not noticed any clinical changes thus far     12/27- the mass is stable in size on repeat imaging   Will continue to monitor radiographically and clinically   Continue tamoxifen for now , tolerates well     3/26- tolerates Tamoxifen 20 mg a day, mass similar in size in general   No emerging side effects   Continue with same, repeat imaging in 3-4 m    7/2/2024-  -Right side breast mass, stable  -5 x 6 mm group of microcalcifications in the left breast, low but suspicious for malignancy. Patient declines biopsy/surgery would prefer to continue to monitor.   -tolerates Tamoxifen 20 mg a day, mass similar in size in general. No emerging side effects.  -Repeat mammo in 2 months      2. Osteoporosis   will obtain dexa scan   on Ca-vit D   did not tolerate Fosamax per daughter      9/27- dexa scheduled next month   12/27- dexa done Oct 2023 showed mild osteoporosis, but a compression fx, finished previously 5 yrs of fosamax, c/w Ca-vit D   Pain in back improving progressively   3/26- s/p injection to SI joint with improvement      RTC 2 m with US/MMG    Patient verbalized understanding, and  all her questions were answered to her satisfaction    30 min spent with patient greater than 50 % of which was spent in consultation and coordination of care.

## 2024-07-03 LAB — CANCER AG27-29 SERPL-ACNC: 31 U/ML (ref 0–38.6)

## 2024-07-16 DIAGNOSIS — C50.911 MALIGNANT NEOPLASM OF RIGHT FEMALE BREAST, UNSPECIFIED ESTROGEN RECEPTOR STATUS, UNSPECIFIED SITE OF BREAST (MULTI): ICD-10-CM

## 2024-07-17 ENCOUNTER — TELEPHONE (OUTPATIENT)
Dept: HEMATOLOGY/ONCOLOGY | Facility: CLINIC | Age: 89
End: 2024-07-17
Payer: MEDICARE

## 2024-07-17 NOTE — TELEPHONE ENCOUNTER
Called and spoke to Kristy, who did have pt's PIN number. Pt's daughter states pt does not want to do breast biopsy, but is ok taking Tamoxifen.  She does not want any extreme treatment.  Kristy wanted all to know this as pt was upset thinking she was to have invasive biopsy etc.  Told we can follow pt's wishes and will let providers know she would like to go this route.

## 2024-07-18 NOTE — TELEPHONE ENCOUNTER
Called and spoke to Kristy.  Per MADI Ibrahim PAC, she is in agreement as well as Dr. Howell, that pt does not need to have a biopsy done and they understand and support the plan of care that the pt desires.  Kristy verbalized appreciation and understanding and states she has been in touch with her mom and had good conversation about the plan.

## 2024-08-12 ENCOUNTER — OFFICE VISIT (OUTPATIENT)
Dept: VASCULAR SURGERY | Facility: HOSPITAL | Age: 89
End: 2024-08-12
Payer: MEDICARE

## 2024-08-12 VITALS
DIASTOLIC BLOOD PRESSURE: 61 MMHG | SYSTOLIC BLOOD PRESSURE: 176 MMHG | BODY MASS INDEX: 19.95 KG/M2 | WEIGHT: 105.6 LBS | HEART RATE: 68 BPM

## 2024-08-12 DIAGNOSIS — I73.9 PAD (PERIPHERAL ARTERY DISEASE) (CMS-HCC): ICD-10-CM

## 2024-08-12 PROCEDURE — 1159F MED LIST DOCD IN RCRD: CPT | Performed by: SURGERY

## 2024-08-12 PROCEDURE — 3077F SYST BP >= 140 MM HG: CPT | Performed by: SURGERY

## 2024-08-12 PROCEDURE — 3078F DIAST BP <80 MM HG: CPT | Performed by: SURGERY

## 2024-08-12 PROCEDURE — 1036F TOBACCO NON-USER: CPT | Performed by: SURGERY

## 2024-08-12 PROCEDURE — 1157F ADVNC CARE PLAN IN RCRD: CPT | Performed by: SURGERY

## 2024-08-12 PROCEDURE — 99214 OFFICE O/P EST MOD 30 MIN: CPT | Performed by: SURGERY

## 2024-08-12 PROCEDURE — 99204 OFFICE O/P NEW MOD 45 MIN: CPT | Performed by: SURGERY

## 2024-08-12 ASSESSMENT — ENCOUNTER SYMPTOMS
DIFFICULTY URINATING: 0
TROUBLE SWALLOWING: 0
SPEECH DIFFICULTY: 0
FEVER: 0
PALPITATIONS: 0
VOMITING: 0
WOUND: 0
NECK PAIN: 0
ABDOMINAL PAIN: 0
SORE THROAT: 0
CHILLS: 0
WHEEZING: 0
CONSTIPATION: 0
HEADACHES: 0
COUGH: 0
FATIGUE: 0
SEIZURES: 0
LIGHT-HEADEDNESS: 0
JOINT SWELLING: 0
DIARRHEA: 0
NAUSEA: 0
COLOR CHANGE: 0
CONFUSION: 0
DIZZINESS: 0
SLEEP DISTURBANCE: 0
ADENOPATHY: 0
FACIAL ASYMMETRY: 0
SHORTNESS OF BREATH: 0
ARTHRALGIAS: 0
WEAKNESS: 0
NUMBNESS: 0

## 2024-08-12 NOTE — PROGRESS NOTES
Subjective   Patient ID: Ramirez Dobbins is a 89 y.o. female who presents for No chief complaint on file..  HPI  89 year old female with past medical history of Afib on eliquis, low back pain, breast ca who presents as a new patient for evaluation of peripheral arterial disease. Referred by her PCP when he noticed on examination her feet were cool. Recent PVR/MANAV testing with no evidence of arterial disease of the RLE and mild arterial disease of the LLE. Patient is ambulatory at baseline with walker, walks 1 mile daily. No claudication or rest pain. No wounds or ulcerations. Does endorse intermittent neuropathy of the feet but not extremely bothersome. Some left ankle swelling, worse at the end of the day. History of VV bilaterally. No history of DVT.     CONCLUSIONS:  Right Lower PVR: No evidence of arterial occlusive disease in the right lower extremity at rest. Decreased digital perfusion noted. Multiphasic flow is noted in the right common femoral artery, right dorsalis pedis artery and right posterior tibial artery.  Left Lower PVR: Evidence of mild arterial occlusive disease in the left lower extremity at rest. Decreased digital perfusion noted. Multiphasic flow is noted in the left common femoral artery, left posterior tibial artery and left dorsalis pedis artery.     Imaging & Doppler Findings:     RIGHT Lower PVR                Pressures Ratios  Right Posterior Tibial (Ankle) 145 mmHg  0.90  Right Dorsalis Pedis (Ankle)   147 mmHg  0.91  Right Digit (Great Toe)        69 mmHg   0.43           LEFT Lower PVR                Pressures Ratios  Left Posterior Tibial (Ankle) 139 mmHg  0.86  Left Dorsalis Pedis (Ankle)   142 mmHg  0.88  Left Digit (Great Toe)        61 mmHg   0.38                           Right     Left  Brachial Pressure 154 mmHg 162 mmHg     Review of Systems   Constitutional:  Negative for chills, fatigue and fever.   HENT:  Negative for congestion, sore throat and trouble swallowing.    Eyes:   Negative for visual disturbance.   Respiratory:  Negative for cough, shortness of breath and wheezing.    Cardiovascular:  Negative for chest pain, palpitations and leg swelling.   Gastrointestinal:  Negative for abdominal pain, constipation, diarrhea, nausea and vomiting.   Endocrine: Negative for cold intolerance and heat intolerance.   Genitourinary:  Negative for difficulty urinating.   Musculoskeletal:  Negative for arthralgias, joint swelling and neck pain.   Skin:  Negative for color change and wound.   Neurological:  Negative for dizziness, seizures, syncope, facial asymmetry, speech difficulty, weakness, light-headedness, numbness and headaches.   Hematological:  Negative for adenopathy.   Psychiatric/Behavioral:  Negative for behavioral problems, confusion and sleep disturbance.        Objective   Physical Exam  Constitutional:       Appearance: Normal appearance.   HENT:      Head: Normocephalic.      Right Ear: External ear normal.      Left Ear: External ear normal.      Nose: Nose normal.      Mouth/Throat:      Mouth: Mucous membranes are moist.   Eyes:      Extraocular Movements: Extraocular movements intact.   Neck:      Vascular: No carotid bruit.   Cardiovascular:      Rate and Rhythm: Normal rate and regular rhythm.      Pulses: Normal pulses.      Comments: Bilateral lower extremities motor and sensory intact. Cap refill intact. No wounds or ulcerations. VV bilaterally. Trace LLE ankle edema   Pulmonary:      Effort: Pulmonary effort is normal. No respiratory distress.      Breath sounds: Normal breath sounds.   Abdominal:      Palpations: Abdomen is soft.      Tenderness: There is no abdominal tenderness.   Musculoskeletal:         General: No swelling or tenderness.      Cervical back: Normal range of motion and neck supple.      Right lower leg: No edema.      Left lower leg: No edema.   Skin:     General: Skin is warm and dry.      Capillary Refill: Capillary refill takes less than 2  seconds.      Findings: No lesion.   Neurological:      General: No focal deficit present.      Mental Status: She is alert and oriented to person, place, and time. Mental status is at baseline.   Psychiatric:         Mood and Affect: Mood normal.         Behavior: Behavior normal.         Thought Content: Thought content normal.         Judgment: Judgment normal.         Assessment/Plan   Problem List Items Addressed This Visit    None  Visit Diagnoses         Codes    PAD (peripheral artery disease) (CMS-[facility])     I73.9        89 year old female with past medical history of Afib on eliquis, low back pain, breast ca who presents as a new patient for evaluation of peripheral arterial disease. Recent PVR/MANAV testing with no evidence of arterial disease of the RLE and mild arterial disease of the LLE. Patient is ambulatory at baseline with walker, walks 1 mile daily. No claudication or rest pain. No wounds or ulcerations. No indication for further vascular testing or intervention.  -Reviewed and discussed recent PVR/MANAV   -Continue daily exercise and ambulation as tolerated  -Follow up 1 year  -Call with new or worsening symptoms  -Encourage podiatry follow up for routine podiatric care  -Dr Christina present          Marcia Garcia PA-C 08/12/24 4:26 PM

## 2024-09-05 ENCOUNTER — APPOINTMENT (OUTPATIENT)
Dept: RADIOLOGY | Facility: HOSPITAL | Age: 89
End: 2024-09-05
Payer: MEDICARE

## 2024-09-24 ENCOUNTER — APPOINTMENT (OUTPATIENT)
Dept: HEMATOLOGY/ONCOLOGY | Facility: CLINIC | Age: 89
End: 2024-09-24
Payer: MEDICARE

## 2024-10-08 ENCOUNTER — HOSPITAL ENCOUNTER (OUTPATIENT)
Dept: RADIOLOGY | Facility: HOSPITAL | Age: 89
Discharge: HOME | End: 2024-10-08
Payer: MEDICARE

## 2024-10-08 VITALS — HEIGHT: 61 IN | BODY MASS INDEX: 19.26 KG/M2 | WEIGHT: 102 LBS

## 2024-10-08 DIAGNOSIS — Z17.0 MALIGNANT NEOPLASM OF UPPER-OUTER QUADRANT OF RIGHT BREAST IN FEMALE, ESTROGEN RECEPTOR POSITIVE: ICD-10-CM

## 2024-10-08 DIAGNOSIS — C50.911 MALIGNANT NEOPLASM OF RIGHT FEMALE BREAST, UNSPECIFIED ESTROGEN RECEPTOR STATUS, UNSPECIFIED SITE OF BREAST: ICD-10-CM

## 2024-10-08 DIAGNOSIS — C50.411 MALIGNANT NEOPLASM OF UPPER-OUTER QUADRANT OF RIGHT BREAST IN FEMALE, ESTROGEN RECEPTOR POSITIVE: ICD-10-CM

## 2024-10-08 PROCEDURE — 76642 ULTRASOUND BREAST LIMITED: CPT | Performed by: RADIOLOGY

## 2024-10-08 PROCEDURE — 77066 DX MAMMO INCL CAD BI: CPT | Performed by: RADIOLOGY

## 2024-10-08 PROCEDURE — 77066 DX MAMMO INCL CAD BI: CPT

## 2024-10-08 PROCEDURE — G0279 TOMOSYNTHESIS, MAMMO: HCPCS | Performed by: RADIOLOGY

## 2024-10-08 PROCEDURE — 76642 ULTRASOUND BREAST LIMITED: CPT | Mod: LT

## 2024-10-08 PROCEDURE — 76982 USE 1ST TARGET LESION: CPT | Mod: LT

## 2024-10-15 ENCOUNTER — OFFICE VISIT (OUTPATIENT)
Dept: HEMATOLOGY/ONCOLOGY | Facility: CLINIC | Age: 89
End: 2024-10-15
Payer: MEDICARE

## 2024-10-15 VITALS
TEMPERATURE: 97.9 F | RESPIRATION RATE: 17 BRPM | WEIGHT: 103.95 LBS | HEART RATE: 59 BPM | OXYGEN SATURATION: 93 % | BODY MASS INDEX: 19.64 KG/M2 | SYSTOLIC BLOOD PRESSURE: 174 MMHG | DIASTOLIC BLOOD PRESSURE: 67 MMHG

## 2024-10-15 DIAGNOSIS — C50.411 MALIGNANT NEOPLASM OF UPPER-OUTER QUADRANT OF RIGHT BREAST IN FEMALE, ESTROGEN RECEPTOR POSITIVE: ICD-10-CM

## 2024-10-15 DIAGNOSIS — E55.9 VITAMIN D DEFICIENCY: Primary | ICD-10-CM

## 2024-10-15 DIAGNOSIS — Z17.0 MALIGNANT NEOPLASM OF UPPER-OUTER QUADRANT OF RIGHT BREAST IN FEMALE, ESTROGEN RECEPTOR POSITIVE: ICD-10-CM

## 2024-10-15 DIAGNOSIS — C50.911 MALIGNANT NEOPLASM OF RIGHT FEMALE BREAST, UNSPECIFIED ESTROGEN RECEPTOR STATUS, UNSPECIFIED SITE OF BREAST: ICD-10-CM

## 2024-10-15 DIAGNOSIS — I10 PRIMARY HYPERTENSION: ICD-10-CM

## 2024-10-15 LAB
ALBUMIN SERPL BCP-MCNC: 3.9 G/DL (ref 3.4–5)
ALP SERPL-CCNC: 29 U/L (ref 33–136)
ALT SERPL W P-5'-P-CCNC: 7 U/L (ref 7–45)
ANION GAP SERPL CALC-SCNC: 12 MMOL/L (ref 10–20)
AST SERPL W P-5'-P-CCNC: 11 U/L (ref 9–39)
BASOPHILS # BLD AUTO: 0.02 X10*3/UL (ref 0–0.1)
BASOPHILS NFR BLD AUTO: 0.3 %
BILIRUB SERPL-MCNC: 0.5 MG/DL (ref 0–1.2)
BUN SERPL-MCNC: 11 MG/DL (ref 6–23)
CALCIUM SERPL-MCNC: 9.4 MG/DL (ref 8.6–10.3)
CHLORIDE SERPL-SCNC: 101 MMOL/L (ref 98–107)
CO2 SERPL-SCNC: 25 MMOL/L (ref 21–32)
CREAT SERPL-MCNC: 0.77 MG/DL (ref 0.5–1.05)
EGFRCR SERPLBLD CKD-EPI 2021: 73 ML/MIN/1.73M*2
EOSINOPHIL # BLD AUTO: 0.11 X10*3/UL (ref 0–0.4)
EOSINOPHIL NFR BLD AUTO: 1.5 %
ERYTHROCYTE [DISTWIDTH] IN BLOOD BY AUTOMATED COUNT: 13.1 % (ref 11.5–14.5)
GLUCOSE SERPL-MCNC: 113 MG/DL (ref 74–99)
HCT VFR BLD AUTO: 42.7 % (ref 36–46)
HGB BLD-MCNC: 13.8 G/DL (ref 12–16)
IMM GRANULOCYTES # BLD AUTO: 0.02 X10*3/UL (ref 0–0.5)
IMM GRANULOCYTES NFR BLD AUTO: 0.3 % (ref 0–0.9)
LYMPHOCYTES # BLD AUTO: 1.33 X10*3/UL (ref 0.8–3)
LYMPHOCYTES NFR BLD AUTO: 17.6 %
MCH RBC QN AUTO: 30.9 PG (ref 26–34)
MCHC RBC AUTO-ENTMCNC: 32.3 G/DL (ref 32–36)
MCV RBC AUTO: 96 FL (ref 80–100)
MONOCYTES # BLD AUTO: 2.09 X10*3/UL (ref 0.05–0.8)
MONOCYTES NFR BLD AUTO: 27.6 %
NEUTROPHILS # BLD AUTO: 4 X10*3/UL (ref 1.6–5.5)
NEUTROPHILS NFR BLD AUTO: 52.7 %
PLATELET # BLD AUTO: 166 X10*3/UL (ref 150–450)
POTASSIUM SERPL-SCNC: 3.9 MMOL/L (ref 3.5–5.3)
PROT SERPL-MCNC: 6.6 G/DL (ref 6.4–8.2)
RBC # BLD AUTO: 4.46 X10*6/UL (ref 4–5.2)
SODIUM SERPL-SCNC: 134 MMOL/L (ref 136–145)
WBC # BLD AUTO: 7.6 X10*3/UL (ref 4.4–11.3)

## 2024-10-15 PROCEDURE — 36415 COLL VENOUS BLD VENIPUNCTURE: CPT | Performed by: PHYSICIAN ASSISTANT

## 2024-10-15 PROCEDURE — 3077F SYST BP >= 140 MM HG: CPT | Performed by: PHYSICIAN ASSISTANT

## 2024-10-15 PROCEDURE — 84075 ASSAY ALKALINE PHOSPHATASE: CPT | Performed by: PHYSICIAN ASSISTANT

## 2024-10-15 PROCEDURE — 1159F MED LIST DOCD IN RCRD: CPT | Performed by: PHYSICIAN ASSISTANT

## 2024-10-15 PROCEDURE — 1126F AMNT PAIN NOTED NONE PRSNT: CPT | Performed by: PHYSICIAN ASSISTANT

## 2024-10-15 PROCEDURE — 3078F DIAST BP <80 MM HG: CPT | Performed by: PHYSICIAN ASSISTANT

## 2024-10-15 PROCEDURE — 85025 COMPLETE CBC W/AUTO DIFF WBC: CPT | Performed by: PHYSICIAN ASSISTANT

## 2024-10-15 PROCEDURE — 86300 IMMUNOASSAY TUMOR CA 15-3: CPT | Performed by: PHYSICIAN ASSISTANT

## 2024-10-15 PROCEDURE — 1157F ADVNC CARE PLAN IN RCRD: CPT | Performed by: PHYSICIAN ASSISTANT

## 2024-10-15 PROCEDURE — 99214 OFFICE O/P EST MOD 30 MIN: CPT | Performed by: PHYSICIAN ASSISTANT

## 2024-10-15 RX ORDER — METOPROLOL SUCCINATE 200 MG/1
200 TABLET, EXTENDED RELEASE ORAL DAILY
Qty: 90 TABLET | Refills: 1 | Status: SHIPPED | OUTPATIENT
Start: 2024-10-15

## 2024-10-15 ASSESSMENT — PAIN SCALES - GENERAL: PAINLEVEL: 0-NO PAIN

## 2024-10-15 NOTE — PROGRESS NOTES
Patient ID: Ramirez Dobbins is a 89 y.o. female.  Referring Physician: Maria Elena Ibrahim PA-C  [id number] [place] [address]  Primary Care Provider: Jose Scahfer DO      Subjective    HPI    Chief Complaint: evaluation for breast cancer   Interval History:    Referred by Dr. Rosas      Reason for referral: breast cancer      HPI     89 year old woman with hx of A. fib on Eliquis, COPD, GERD, osteoporosis, hypertension, vit D deficiency, left bipolar hip hemiarthroplasty  who presented to the hospital in June 2022 after mechanical ground-level fall with left hip/groin pain.    CT scans of spine and C/A/P on Jun 6th, 2022 showed Acute comminuted left pelvic fracture extending to the left acetabulum,  anterior column and left iliac fossa. However it showed a somewhat speculated mass in the right breast measuring 1.2 x 2.1 x 1.8 cm. she had PT/OT and no immediate ortho interventions.   She then had work up for the breast mass in May 2023      -5/18/23 US and diagnostic MMG of the b/l breast: right breast mass at 12 o’clock position measuring 2.6 x 2.0 x 1.7 cm in size, no pathologically enlarged LN   -bx on 5/18/23 showed IDC grade 2, ER positive (>95%), MT positive (>95%) and Her2 negative (1+ IHC)   -seen with Dr. Rosas   -CT scans of C/A/P 6/7/23: no metastatic disease, mass measuring 2.6 x 1,6 cm   -CT scans in ER on 8/21/23 of C/T/L spine and C/A/P following another fall: mild compression deformity of T10 spine, right breast mass       she does not want to have surgery   she has pain in the back from her fall and   she is using tylenol and lidocaine patches   it is improved with movement   able to ambulate with her walker   she is on calcium-vit D   she tried alendronate in the past   she knew she had a breast   she can feel the breast lump which is enlarging per patient and she thinks she has some pain in the left breast   she used heating pad to improve, it is intermittent and worsened , she does not feel  the lump   not eating well since the fall   prior to that she was pretty active   no nausea or vomiting     she has started Tamoxifen 8/30/2023 with good tolerance.    PAST MEDICAL HISTORY:   A. fib on Eliquis, COPD, GERD, osteoporosis, hypertension, vit D deficiency, left bipolar hip hemiarthroplasty who presented to the hospital  in June 2022 after mechanical ground-level fall with left hip/groin pain.  hysterectomy   cholecystostomy      SOCIAL HISTORY:   sister had breast cancer, she was not at a young age   former smoker, quit 20 yrs ago   no alcohol   she owned ice cream selling business   she has 3 children      FAMILY HISTORY:    sister had breast cancer, she was not at a young age   No other specific history of bleeding, clotting or malignant disorder in the family.    Interval History:  Patient presents for follow up accompanied by her DIL. On assessment, no new complaints or issues. She continues on Tamoxifen, tolerating well. No new falls. Eating and drinking well. Breast mass similar. Walks with walker. Manny hot flashes, No LE swelling or pain, n/v/d/abdpain, bleeding from any source or any other complaints at this time.     REVIEW OF SYSTEMS:  Pertinent finding as per the history above.  There are no additional specific symptoms pertaining to eyes, ENT, hematologic, lymphatic, neurological, psychiatric, cardiac,  pulmonary, GI, , endocrine, rheumatic, dermatological, or musculoskeletal systems.  All other systems have been reviewed and generally negative and noncontributory.     PHYSICAL EXAM:  Constitutional: alert, awake and oriented, not in acute distress   HEENT: moist mucous membranes, normal nose   Neck: supple, no lymphadenopathy   EYES: PERRL, EOM intact, conjunctiva normal  Skin: no jaundice, rash or erythema  Neurological: AAOx3, no gross focal deficit   Psychiatric: normal mood and behavior     Vitals:       5/9/2024    10:00 AM 5/23/2024    12:55 PM 6/27/2024     2:00 PM 7/2/2024     "12:53 PM 8/12/2024     1:56 PM 10/8/2024     9:26 AM 10/15/2024    11:47 AM   Vitals   Systolic 174 140  164 176  174   Diastolic 68 52  51 61  67   Heart Rate 66 67  60 68  59   Temp  36.8 °C (98.2 °F)  36 °C (96.8 °F)   36.6 °C (97.9 °F)   Resp    16   17   Height (in)  1.549 m (5' 1\") 1.549 m (5' 1\")   1.549 m (5' 1\")    Weight (lb) 104.06 106.6 102 105.6 105.6 102 103.95   BMI 19.66 kg/m2 20.14 kg/m2 19.27 kg/m2 19.95 kg/m2 19.95 kg/m2 19.27 kg/m2 19.64 kg/m2   BSA (m2) 1.43 m2 1.44 m2 1.41 m2 1.44 m2 1.44 m2 1.41 m2 1.43 m2   Visit Report Report Report  Report Report  Report     Imaging:  BI mammo bilateral diagnostic tomosynthesis    Result Date: 10/8/2024  Interpreted By:  Tonya Cortes, STUDY: BI MAMMO BILATERAL DIAGNOSTIC TOMOSYNTHESIS; BI US BREAST LIMITED LEFT;  10/8/2024 9:09 am; 10/8/2024 9:57 am   ACCESSION NUMBER(S): [id number]; [id number]   ORDERING CLINICIAN: FERNANDO SAMAYOA   INDICATION:   ,C50.911 Malignant neoplasm of unspecified site of right female breast,C50.411 Malignant neoplasm of upper-outer quadrant of right female breast,Z17.0 Estrogen receptor positive status (ER+)   COMPARISON: 06/27/2024   FINDINGS: MAMMOGRAPHY: 2D and tomosynthesis images were reviewed at 1 mm slice thickness.   Density:  There are scattered areas of fibroglandular density.   Spiculated mass within central aspect of the right breast just lateral to the nipple line similar to prior, overall stable.   Small group of heterogeneous microcalcifications within the left breast within the lateral aspect at posterior depth have increased in number since previous exam. There is an associated low-density mass asymmetry with microcalcifications. No additional suspicious masses or microcalcifications.   ULTRASOUND:   Targeted ultrasound of the left breast was performed. In the area of microcalcifications associated with mass asymmetry there is a small hypoechoic mass with sharp borders and increased through transmission but no " definite associated calcifications are seen sonographically. This is soft on elastography and shows no evidence for posterior shadowing or vascular flow. It measures 8 x 2 x 7 mm in size. No additional cystic or solid masses are seen.       1. Stable spiculated mass in the right breast, biopsy proven malignancy. 2. Suspicious microcalcifications in the left breast, increased in number since prior. Stereotactic guided biopsy recommended.   BI-RADS CATEGORY: BI-RADS CATEGORY:  6 Known Biopsy-Proven Malignancy. Recommendation:  Immediate Follow-up. Recommended Date:  Immediate. Laterality:  Bilateral.   For any future breast imaging appointments, please call [phone] ([phone]).   SUPPLEMENTAL INFORMATION: Notifi message was left for FERNANDO DANITA regarding this exam by Dr. Cortes on 10/8/2024 at approximately 17:34 hours.   MACRO: Critical Finding:  See findings. Notification was initiated on 10/8/2024 at 5:34 pm by  Tonya Cortes.  (**-YCF-**) Instructions:   Signed by: Tonya Cortes 10/8/2024 5:37 PM Dictation workstation:   [id number]    BI US breast limited left    Result Date: 10/8/2024  Interpreted By:  Tonya Cortes, STUDY: BI MAMMO BILATERAL DIAGNOSTIC TOMOSYNTHESIS; BI US BREAST LIMITED LEFT;  10/8/2024 9:09 am; 10/8/2024 9:57 am   ACCESSION NUMBER(S): [id number]; [id number]   ORDERING CLINICIAN: FERNANDO SAMAYOA   INDICATION:   ,C50.911 Malignant neoplasm of unspecified site of right female breast,C50.411 Malignant neoplasm of upper-outer quadrant of right female breast,Z17.0 Estrogen receptor positive status (ER+)   COMPARISON: 06/27/2024   FINDINGS: MAMMOGRAPHY: 2D and tomosynthesis images were reviewed at 1 mm slice thickness.   Density:  There are scattered areas of fibroglandular density.   Spiculated mass within central aspect of the right breast just lateral to the nipple line similar to prior, overall stable.   Small group of heterogeneous microcalcifications within the left breast within the lateral  aspect at posterior depth have increased in number since previous exam. There is an associated low-density mass asymmetry with microcalcifications. No additional suspicious masses or microcalcifications.   ULTRASOUND:   Targeted ultrasound of the left breast was performed. In the area of microcalcifications associated with mass asymmetry there is a small hypoechoic mass with sharp borders and increased through transmission but no definite associated calcifications are seen sonographically. This is soft on elastography and shows no evidence for posterior shadowing or vascular flow. It measures 8 x 2 x 7 mm in size. No additional cystic or solid masses are seen.       1. Stable spiculated mass in the right breast, biopsy proven malignancy. 2. Suspicious microcalcifications in the left breast, increased in number since prior. Stereotactic guided biopsy recommended.   BI-RADS CATEGORY: BI-RADS CATEGORY:  6 Known Biopsy-Proven Malignancy. Recommendation:  Immediate Follow-up. Recommended Date:  Immediate. Laterality:  Bilateral.   For any future breast imaging appointments, please call [phone] ([phone]).   SUPPLEMENTAL INFORMATION: Notifi message was left for FERNANDO BONDSOCTAVIANO regarding this exam by Dr. Cortes on 10/8/2024 at approximately 17:34 hours.   MACRO: Critical Finding:  See findings. Notification was initiated on 10/8/2024 at 5:34 pm by  Tonya Cortes.  (**-YCF-**) Instructions:   Signed by: Tonya Cortes 10/8/2024 5:37 PM Dictation workstation:   [id number]     Assessment/Plan      1. IDC of right breast      ER positive, SC positive, Her-2 negative   [id number]   patient does not want to proceed with surgery   we discussed endocrine therapy today, discussed that this is not for curative purposes, she understands that   options for Tamoxifen and anastrozole discussed, pros and cons discussed   since she has already osteoporosis and hx of falls including recent one with compression fx , will give Tamoxifen   start Esparza  20 mg a day  she has hx of hysterectomy and she is already on Eliquis      9/27- continue Tamoxifen tolerated at this time   we will restage with US in 3 months, has not noticed any clinical changes thus far     12/27- the mass is stable in size on repeat imaging   Will continue to monitor radiographically and clinically   Continue tamoxifen for now , tolerates well     3/26- tolerates Tamoxifen 20 mg a day, mass similar in size in general   No emerging side effects   Continue with same, repeat imaging in 3-4 m    7/2/2024-  -Right side breast mass, stable  -5 x 6 mm group of microcalcifications in the left breast, low but suspicious for malignancy. Patient declines biopsy/surgery would prefer to continue to monitor.   -tolerates Tamoxifen 20 mg a day, mass similar in size in general. No emerging side effects.  -Repeat mammo in 2 months     10/15/2024-  -Right side breast mass, stable  -Suspicious microcalcifications in the left breast, increased in number since prior.  Again discussed biopsy but patient adamant that she doesn't want surgery (including biopsy)or radiation or ay other forms of treatment when/if she had disease progression. She prefers to continue on Tamoxifen without further imaging. She is tolerating Tamoxifen well with no side effects.     2. Osteoporosis   will obtain dexa scan   on Ca-vit D   did not tolerate Fosamax per daughter      9/27- dexa scheduled next month   12/27- dexa done Oct 2023 showed mild osteoporosis, but a compression fx, finished previously 5 yrs of fosamax, c/w Ca-vit D   Pain in back improving progressively   3/26- s/p injection to SI joint with improvement      RTC in 6 months     Patient verbalized understanding, and all her questions were answered to her satisfaction    30 min spent with patient greater than 50 % of which was spent in consultation and coordination of care.

## 2024-10-16 LAB — CANCER AG27-29 SERPL-ACNC: 40 U/ML (ref 0–38.6)

## 2024-11-19 ENCOUNTER — TELEPHONE (OUTPATIENT)
Dept: PRIMARY CARE | Facility: CLINIC | Age: 89
End: 2024-11-19
Payer: MEDICARE

## 2024-11-19 NOTE — TELEPHONE ENCOUNTER
She is having trouble swallowing, has lost 10 lbs. She has appt on 11/27 but they were hoping you could see her this week.

## 2024-11-27 ENCOUNTER — OFFICE VISIT (OUTPATIENT)
Dept: PRIMARY CARE | Facility: CLINIC | Age: 89
End: 2024-11-27
Payer: MEDICARE

## 2024-11-27 VITALS
BODY MASS INDEX: 19.23 KG/M2 | WEIGHT: 101.8 LBS | TEMPERATURE: 97.4 F | RESPIRATION RATE: 18 BRPM | HEART RATE: 75 BPM | DIASTOLIC BLOOD PRESSURE: 70 MMHG | OXYGEN SATURATION: 97 % | SYSTOLIC BLOOD PRESSURE: 160 MMHG

## 2024-11-27 DIAGNOSIS — I48.0 PAROXYSMAL ATRIAL FIBRILLATION (MULTI): ICD-10-CM

## 2024-11-27 DIAGNOSIS — R09.81 NASAL CONGESTION: ICD-10-CM

## 2024-11-27 DIAGNOSIS — M81.0 AGE-RELATED OSTEOPOROSIS WITHOUT CURRENT PATHOLOGICAL FRACTURE: ICD-10-CM

## 2024-11-27 DIAGNOSIS — R25.2 LEG CRAMPS: ICD-10-CM

## 2024-11-27 DIAGNOSIS — R13.19 ESOPHAGEAL DYSPHAGIA: ICD-10-CM

## 2024-11-27 DIAGNOSIS — Z00.00 ROUTINE GENERAL MEDICAL EXAMINATION AT HEALTH CARE FACILITY: Primary | ICD-10-CM

## 2024-11-27 PROCEDURE — 1123F ACP DISCUSS/DSCN MKR DOCD: CPT | Performed by: FAMILY MEDICINE

## 2024-11-27 PROCEDURE — 1158F ADVNC CARE PLAN TLK DOCD: CPT | Performed by: FAMILY MEDICINE

## 2024-11-27 PROCEDURE — 1160F RVW MEDS BY RX/DR IN RCRD: CPT | Performed by: FAMILY MEDICINE

## 2024-11-27 PROCEDURE — 99214 OFFICE O/P EST MOD 30 MIN: CPT | Performed by: FAMILY MEDICINE

## 2024-11-27 PROCEDURE — 1170F FXNL STATUS ASSESSED: CPT | Performed by: FAMILY MEDICINE

## 2024-11-27 PROCEDURE — 1157F ADVNC CARE PLAN IN RCRD: CPT | Performed by: FAMILY MEDICINE

## 2024-11-27 PROCEDURE — 3078F DIAST BP <80 MM HG: CPT | Performed by: FAMILY MEDICINE

## 2024-11-27 PROCEDURE — 1126F AMNT PAIN NOTED NONE PRSNT: CPT | Performed by: FAMILY MEDICINE

## 2024-11-27 PROCEDURE — 99215 OFFICE O/P EST HI 40 MIN: CPT | Performed by: FAMILY MEDICINE

## 2024-11-27 PROCEDURE — G0439 PPPS, SUBSEQ VISIT: HCPCS | Performed by: FAMILY MEDICINE

## 2024-11-27 PROCEDURE — 1159F MED LIST DOCD IN RCRD: CPT | Performed by: FAMILY MEDICINE

## 2024-11-27 PROCEDURE — 3077F SYST BP >= 140 MM HG: CPT | Performed by: FAMILY MEDICINE

## 2024-11-27 PROCEDURE — 1036F TOBACCO NON-USER: CPT | Performed by: FAMILY MEDICINE

## 2024-11-27 ASSESSMENT — ENCOUNTER SYMPTOMS
OCCASIONAL FEELINGS OF UNSTEADINESS: 1
DEPRESSION: 0
LOSS OF SENSATION IN FEET: 1

## 2024-11-27 ASSESSMENT — LIFESTYLE VARIABLES
SKIP TO QUESTIONS 9-10: 1
AUDIT-C TOTAL SCORE: 0
HOW MANY STANDARD DRINKS CONTAINING ALCOHOL DO YOU HAVE ON A TYPICAL DAY: PATIENT DOES NOT DRINK
HOW OFTEN DO YOU HAVE A DRINK CONTAINING ALCOHOL: NEVER
HOW OFTEN DO YOU HAVE SIX OR MORE DRINKS ON ONE OCCASION: NEVER

## 2024-11-27 ASSESSMENT — PAIN SCALES - GENERAL: PAINLEVEL_OUTOF10: 0-NO PAIN

## 2024-11-27 ASSESSMENT — ACTIVITIES OF DAILY LIVING (ADL)
DRESSING: INDEPENDENT
DOING_HOUSEWORK: TOTAL CARE
MANAGING_FINANCES: NEEDS ASSISTANCE
BATHING: INDEPENDENT
GROCERY_SHOPPING: NEEDS ASSISTANCE
TAKING_MEDICATION: INDEPENDENT

## 2024-11-27 NOTE — PATIENT INSTRUCTIONS
Here for medicare physical.  Up to date on blood work.  Flu shot next week.      Pt is having significant dysphagia.  Losing weight, more weak.  Eating soft foods.      For breast cancer - reoccurrence of breast cancer - on tamoxifen.  Seeing oncology.     For atrial fibrillation - stable on medications.  Continue metoprolol and eliquis.     For nasal congestion - flonase is helping.      For leg cramps - trial of pickle juice vs full tab of muscle relaxant.      Call if unable to get into gastroenterologist.     Follow up in 6 months.

## 2024-11-27 NOTE — PROGRESS NOTES
Subjective   Reason for Visit: Ramirez Dobbins is an 89 y.o. female here for a Medicare Wellness visit.     Past Medical, Surgical, and Family History reviewed and updated in chart.    Reviewed all medications by prescribing practitioner or clinical pharmacist (such as prescriptions, OTCs, herbal therapies and supplements) and documented in the medical record.    Here for medicare physical.  Living with son and daughter in law.  Living in - in law suite and walker.      Dysphagia  -Pt is having worsening issues swallowing.  Still has mucus frequently.  Losing her voice.  Patient is only able to eat soft foot.  Patient had episode of toast getting stuck in throat.  Patinet is only able to drink small amount of liquid.  Pt is taking boost.  No vomiting, pt is getting reflux symptoms.  Pt has lost 10 pounds.  Just when eating.  If the am - still having feelings of food getting stuck.    -Flonase is helping with mucus.      Atrial Fibrillation  -F/U:  Stable.  No recent issues. On metoprolol  -Anticoagulation: Eliquis - no bleeding   -Cardiologist:  Dr. Page.      Low back pain  -hx of compression fractures.  Fall last year.  Scoliosis.  Using lidocaine patches.  Robaxin at night.  Patient trying to avoid narcotic therapy.  Located in the low back.  Appt with PM&R.  Was on fosamax for 5 years.  No medication after.  Hx of multiple fractures.     Cancer History:  -Type: Breast Cancer  -Stage:  -Oncologist: Dr. Umaña  -F/U: Recently diagnosed last June - tolerating tamoxifen.  No hot flashes.  Repeat images.  Pt has reoccurrance of breast cancer.  Pt is on tamoxifen    Leg cramps.   -Worse in am.  No vascular disease.          Dysphagia        Patient Care Team:  Jose Schafer DO as PCP - General (Family Medicine)  Luis Umaña MD as Consulting Physician (Hematology and Oncology)  Blayne Page MD as Consulting Physician (Cardiology)     Review of Systems    Objective   Vitals:  Temp 36.3 °C (97.4 °F)  (Temporal)   Wt 46.2 kg (101 lb 12.8 oz)   BMI 19.23 kg/m²       Physical Exam  Vitals reviewed.   Constitutional:       General: She is not in acute distress.  HENT:      Right Ear: Tympanic membrane and ear canal normal.      Left Ear: Tympanic membrane and ear canal normal. Tympanic membrane is not erythematous.      Nose: Rhinorrhea present.      Right Turbinates: Swollen.      Left Turbinates: Swollen.   Cardiovascular:      Rate and Rhythm: Normal rate and regular rhythm.   Pulmonary:      Effort: Pulmonary effort is normal.      Breath sounds: No wheezing or rhonchi.   Musculoskeletal:      Right lower leg: No edema.      Left lower leg: No edema.      Comments: Using rollator   Lymphadenopathy:      Cervical: No cervical adenopathy.   Skin:     Capillary Refill: Left foot - cool foot, TP 0/4, DP 1/4, cap refill > 3 sec, no wounds  Neurological:      Mental Status: She is alert.   Psychiatric:         Mood and Affect: Mood normal.         Behavior: Behavior normal.         Assessment & Plan  Routine general medical examination at health care facility         Esophageal dysphagia    Orders:    Referral to Gastroenterology; Future    Leg cramps         Paroxysmal atrial fibrillation (Multi)         Nasal congestion         Age-related osteoporosis without current pathological fracture            Patient Instructions   Here for medicare physical.  Up to date on blood work.  Flu shot next week.      Pt is having significant dysphagia.  Losing weight, more weak.  Eating soft foods.      For breast cancer - reoccurrence of breast cancer - on tamoxifen.  Seeing oncology.     For atrial fibrillation - stable on medications.  Continue metoprolol and eliquis.     For nasal congestion - flonase is helping.      For leg cramps - trial of pickle juice vs full tab of muscle relaxant.      Call if unable to get into gastroenterologist.     Follow up in 6 months.

## 2024-11-29 NOTE — TELEPHONE ENCOUNTER
Was told to call if unable to get appointment with gastroenterologist in a timely manner. The gastroenterologist  isn't able to see Ramirez until March.  Please call & advise.  Thank you

## 2024-12-06 ENCOUNTER — PATIENT MESSAGE (OUTPATIENT)
Dept: PRIMARY CARE | Facility: CLINIC | Age: 89
End: 2024-12-06
Payer: MEDICARE

## 2024-12-06 DIAGNOSIS — R13.19 ESOPHAGEAL DYSPHAGIA: Primary | ICD-10-CM

## 2024-12-06 DIAGNOSIS — C50.911 MALIGNANT NEOPLASM OF RIGHT FEMALE BREAST, UNSPECIFIED ESTROGEN RECEPTOR STATUS, UNSPECIFIED SITE OF BREAST: Primary | ICD-10-CM

## 2024-12-09 ENCOUNTER — PATIENT MESSAGE (OUTPATIENT)
Dept: PRIMARY CARE | Facility: CLINIC | Age: 89
End: 2024-12-09
Payer: MEDICARE

## 2024-12-09 DIAGNOSIS — I48.0 PAROXYSMAL ATRIAL FIBRILLATION (MULTI): ICD-10-CM

## 2024-12-09 DIAGNOSIS — J44.9 CHRONIC OBSTRUCTIVE PULMONARY DISEASE, UNSPECIFIED COPD TYPE (MULTI): Primary | ICD-10-CM

## 2024-12-09 RX ORDER — LEVALBUTEROL TARTRATE 45 UG/1
1-2 AEROSOL, METERED ORAL EVERY 4 HOURS PRN
Qty: 15 G | Refills: 2 | Status: SHIPPED | OUTPATIENT
Start: 2024-12-09

## 2024-12-09 RX ORDER — APIXABAN 2.5 MG/1
2.5 TABLET, FILM COATED ORAL EVERY 12 HOURS
Qty: 180 TABLET | Refills: 3 | Status: SHIPPED | OUTPATIENT
Start: 2024-12-09

## 2024-12-17 PROCEDURE — G0452 MOLECULAR PATHOLOGY INTERPR: HCPCS | Performed by: INTERNAL MEDICINE

## 2024-12-17 PROCEDURE — 88342 IMHCHEM/IMCYTCHM 1ST ANTB: CPT

## 2024-12-17 PROCEDURE — 88341 IMHCHEM/IMCYTCHM EA ADD ANTB: CPT

## 2024-12-17 PROCEDURE — 81288 MLH1 GENE: CPT

## 2024-12-17 PROCEDURE — 88363 XM ARCHIVE TISSUE MOLEC ANAL: CPT | Performed by: INTERNAL MEDICINE

## 2024-12-17 PROCEDURE — 88305 TISSUE EXAM BY PATHOLOGIST: CPT

## 2024-12-17 PROCEDURE — 88305 TISSUE EXAM BY PATHOLOGIST: CPT | Performed by: STUDENT IN AN ORGANIZED HEALTH CARE EDUCATION/TRAINING PROGRAM

## 2024-12-17 PROCEDURE — 88342 IMHCHEM/IMCYTCHM 1ST ANTB: CPT | Performed by: STUDENT IN AN ORGANIZED HEALTH CARE EDUCATION/TRAINING PROGRAM

## 2024-12-17 PROCEDURE — 88341 IMHCHEM/IMCYTCHM EA ADD ANTB: CPT | Performed by: STUDENT IN AN ORGANIZED HEALTH CARE EDUCATION/TRAINING PROGRAM

## 2024-12-18 ENCOUNTER — LAB REQUISITION (OUTPATIENT)
Dept: LAB | Facility: HOSPITAL | Age: 89
End: 2024-12-18
Payer: MEDICARE

## 2024-12-18 DIAGNOSIS — D49.0 NEOPLASM OF UNSPECIFIED BEHAVIOR OF DIGESTIVE SYSTEM: Primary | ICD-10-CM

## 2024-12-18 DIAGNOSIS — D49.0 NEOPLASM OF UNSPECIFIED BEHAVIOR OF DIGESTIVE SYSTEM: ICD-10-CM

## 2024-12-19 ENCOUNTER — LAB (OUTPATIENT)
Dept: LAB | Facility: LAB | Age: 89
End: 2024-12-19
Payer: MEDICARE

## 2024-12-19 ENCOUNTER — APPOINTMENT (OUTPATIENT)
Dept: LAB | Facility: LAB | Age: 89
End: 2024-12-19
Payer: MEDICARE

## 2024-12-19 DIAGNOSIS — D49.0 NEOPLASM OF UNSPECIFIED BEHAVIOR OF DIGESTIVE SYSTEM: ICD-10-CM

## 2024-12-19 LAB
BUN SERPL-MCNC: 15 MG/DL (ref 6–23)
CREAT SERPL-MCNC: 0.73 MG/DL (ref 0.5–1.05)
EGFRCR SERPLBLD CKD-EPI 2021: 78 ML/MIN/1.73M*2

## 2024-12-19 PROCEDURE — 82565 ASSAY OF CREATININE: CPT

## 2024-12-19 PROCEDURE — 84520 ASSAY OF UREA NITROGEN: CPT

## 2024-12-19 PROCEDURE — 36415 COLL VENOUS BLD VENIPUNCTURE: CPT

## 2024-12-23 ENCOUNTER — HOSPITAL ENCOUNTER (OUTPATIENT)
Dept: RADIOLOGY | Facility: HOSPITAL | Age: 89
Discharge: HOME | End: 2024-12-23
Payer: MEDICARE

## 2024-12-23 DIAGNOSIS — D49.0 NEOPLASM OF UNSPECIFIED BEHAVIOR OF DIGESTIVE SYSTEM: ICD-10-CM

## 2024-12-23 PROCEDURE — A9698 NON-RAD CONTRAST MATERIALNOC: HCPCS | Performed by: FAMILY MEDICINE

## 2024-12-23 PROCEDURE — 74177 CT ABD & PELVIS W/CONTRAST: CPT

## 2024-12-23 PROCEDURE — 74177 CT ABD & PELVIS W/CONTRAST: CPT | Performed by: STUDENT IN AN ORGANIZED HEALTH CARE EDUCATION/TRAINING PROGRAM

## 2024-12-23 PROCEDURE — 2550000001 HC RX 255 CONTRASTS: Performed by: FAMILY MEDICINE

## 2024-12-24 DIAGNOSIS — K21.9 GASTROESOPHAGEAL REFLUX DISEASE WITHOUT ESOPHAGITIS: ICD-10-CM

## 2024-12-24 RX ORDER — FAMOTIDINE 20 MG/1
20 TABLET, FILM COATED ORAL 2 TIMES DAILY
Qty: 180 TABLET | Refills: 1 | Status: SHIPPED | OUTPATIENT
Start: 2024-12-24

## 2024-12-26 LAB
LAB AP ASR DISCLAIMER: NORMAL
LABORATORY COMMENT REPORT: NORMAL
PATH REPORT.COMMENTS IMP SPEC: NORMAL
PATH REPORT.FINAL DX SPEC: NORMAL
PATH REPORT.GROSS SPEC: NORMAL
PATH REPORT.TOTAL CANCER: NORMAL

## 2025-01-07 LAB
LAB AP ASR DISCLAIMER: NORMAL
LABORATORY COMMENT REPORT: NORMAL
PATH REPORT.ADDENDUM SPEC: NORMAL
PATH REPORT.ADDENDUM SPEC: NORMAL
PATH REPORT.COMMENTS IMP SPEC: NORMAL
PATH REPORT.FINAL DX SPEC: NORMAL
PATH REPORT.GROSS SPEC: NORMAL
PATH REPORT.TOTAL CANCER: NORMAL

## 2025-01-08 ENCOUNTER — APPOINTMENT (OUTPATIENT)
Dept: RADIOLOGY | Facility: HOSPITAL | Age: OVER 89
End: 2025-01-08
Payer: MEDICARE

## 2025-01-09 ENCOUNTER — HOSPITAL ENCOUNTER (OUTPATIENT)
Dept: RADIOLOGY | Facility: HOSPITAL | Age: OVER 89
Discharge: HOME | End: 2025-01-09
Payer: MEDICARE

## 2025-01-09 DIAGNOSIS — D49.0 NEOPLASM OF UNSPECIFIED BEHAVIOR OF DIGESTIVE SYSTEM: ICD-10-CM

## 2025-01-09 PROCEDURE — 71260 CT THORAX DX C+: CPT

## 2025-01-09 PROCEDURE — 2550000001 HC RX 255 CONTRASTS: Performed by: INTERNAL MEDICINE

## 2025-01-09 PROCEDURE — 71260 CT THORAX DX C+: CPT | Performed by: RADIOLOGY

## 2025-01-09 RX ADMIN — IOHEXOL 50 ML: 350 INJECTION, SOLUTION INTRAVENOUS at 12:12

## 2025-01-13 ENCOUNTER — OFFICE VISIT (OUTPATIENT)
Dept: HEMATOLOGY/ONCOLOGY | Facility: CLINIC | Age: OVER 89
End: 2025-01-13
Payer: MEDICARE

## 2025-01-13 ENCOUNTER — NUTRITION (OUTPATIENT)
Dept: HEMATOLOGY/ONCOLOGY | Facility: CLINIC | Age: OVER 89
End: 2025-01-13

## 2025-01-13 VITALS
RESPIRATION RATE: 18 BRPM | DIASTOLIC BLOOD PRESSURE: 57 MMHG | HEART RATE: 67 BPM | TEMPERATURE: 96.4 F | BODY MASS INDEX: 18.37 KG/M2 | OXYGEN SATURATION: 93 % | SYSTOLIC BLOOD PRESSURE: 152 MMHG | WEIGHT: 97.22 LBS

## 2025-01-13 DIAGNOSIS — C80.1 ADENOCARCINOMA (MULTI): Primary | ICD-10-CM

## 2025-01-13 PROCEDURE — 99215 OFFICE O/P EST HI 40 MIN: CPT | Performed by: INTERNAL MEDICINE

## 2025-01-13 PROCEDURE — 3077F SYST BP >= 140 MM HG: CPT | Performed by: INTERNAL MEDICINE

## 2025-01-13 PROCEDURE — 1125F AMNT PAIN NOTED PAIN PRSNT: CPT | Performed by: INTERNAL MEDICINE

## 2025-01-13 PROCEDURE — 3078F DIAST BP <80 MM HG: CPT | Performed by: INTERNAL MEDICINE

## 2025-01-13 PROCEDURE — 1159F MED LIST DOCD IN RCRD: CPT | Performed by: INTERNAL MEDICINE

## 2025-01-13 PROCEDURE — 1157F ADVNC CARE PLAN IN RCRD: CPT | Performed by: INTERNAL MEDICINE

## 2025-01-13 RX ORDER — OMEPRAZOLE 20 MG/1
1 CAPSULE, DELAYED RELEASE ORAL
COMMUNITY
Start: 2024-12-17

## 2025-01-13 RX ORDER — SENNOSIDES 8.6 MG/1
1 TABLET ORAL DAILY
COMMUNITY

## 2025-01-13 RX ORDER — HEPARIN SODIUM,PORCINE/PF 10 UNIT/ML
50 SYRINGE (ML) INTRAVENOUS AS NEEDED
OUTPATIENT
Start: 2025-01-13

## 2025-01-13 RX ORDER — SUCRALFATE 1 G/10ML
SUSPENSION ORAL
COMMUNITY
Start: 2024-12-20

## 2025-01-13 RX ORDER — HEPARIN 100 UNIT/ML
500 SYRINGE INTRAVENOUS AS NEEDED
OUTPATIENT
Start: 2025-01-13

## 2025-01-13 ASSESSMENT — PAIN SCALES - GENERAL: PAINLEVEL_OUTOF10: 4

## 2025-01-13 NOTE — PROGRESS NOTES
NUTRITION Assessment NOTE    Nutrition Assessment     Reason for Visit:  Ramirez Dobbins is a 89 y.o. female who presents for GE junction adenocarcinoma. R upper lobe lung mass - primary lung vs mets from GE junction.  Pt not interested in radical treatment/surgery/chemotherapy, not willing to proceed with another biopsy.   Pending plan Pembrolizumab, palliative radiation  Spoke with daughter Kristy over the phone  At time of EGD daughter states pt had a dilation which has helped slightly  Someone is with pt most of the time    Visit Type: Clinical Nutrition, Oncology, Telephone Visit:  A telephone visit (audio only) between the patient (at the distant site) and the provider (at the originating site) was utilized to provide this telehealth service.    Verbal Consent for Encounter: Verbal consent was requested and obtained from patient on this date for a telehealth visit.      Lab Results   Component Value Date/Time    GLUCOSE 113 (H) 10/15/2024 1142     (L) 10/15/2024 1142    K 3.9 10/15/2024 1142     10/15/2024 1142    CO2 25 10/15/2024 1142    ANIONGAP 12 10/15/2024 1142    BUN 15 12/19/2024 1205    CREATININE 0.73 12/19/2024 1205    EGFR 78 12/19/2024 1205    CALCIUM 9.4 10/15/2024 1142    ALBUMIN 3.9 10/15/2024 1142    ALKPHOS 29 (L) 10/15/2024 1142    PROT 6.6 10/15/2024 1142    AST 11 10/15/2024 1142    BILITOT 0.5 10/15/2024 1142    ALT 7 10/15/2024 1142    MG 2.02 06/12/2022 0636    PHOS 2.8 06/12/2022 0636     Lab Results   Component Value Date/Time    VITD25 60 12/27/2023 1447       Anthropometrics:  Anthropometrics  IBW/kg (Dietitian Calculated): 47.7 kg  Percent of IBW: 93 %  Weight Change  Weight History / % Weight Change: ~ 10# weight loss over 6 months, 7%  Significant Weight Loss: No        Wt Readings from Last 10 Encounters:   01/13/25 (!) 44.1 kg (97 lb 3.6 oz)   11/27/24 46.2 kg (101 lb 12.8 oz)   10/15/24 47.1 kg (103 lb 15.2 oz)   10/08/24 46.3 kg (102 lb)   08/12/24 47.9 kg (105 lb  9.6 oz)   07/02/24 47.9 kg (105 lb 9.6 oz)   06/27/24 46.3 kg (102 lb)   05/23/24 48.4 kg (106 lb 9.6 oz)   05/09/24 47.2 kg (104 lb 0.9 oz)   04/16/24 47.6 kg (105 lb)        Food And Nutrient Intake:  Food and Nutrient History  GI Symptoms: early satiety, reflux  Oral Problems: dysphagia     Food Intake  Food Variety:  (does not tolerate spicy or acidic foods, dry harsh foods. Fills up quickly. Can get some reflux/reguritation/pain if she eats too fast, too much. Daughter started grinding up food, meat. Consumes some frozen dinners)    Food Preparation  Cooking: Family  Grocery Shopping: Family                                       Food Supplement Intake  Oral Nutrition Supplements:  ([employer] Core Protein ~ 1/day. Fills her up, takes a small amount in between meals)           Nutrition Focused Physical Exam Findings: deferred phone visit                          Energy Needs  Estimated Energy Needs  Total Energy Estimated Needs (kCal): 1325 kCal  Total Estimated Energy Need per Day (kCal/kg): 30 kCal/kg  Estimated Fluid Needs  Total Fluid Estimated Needs (mL): 1325 mL  Method for Estimating Needs: 1 ml/kcal  Estimated Protein Needs  Total Protein Estimated Needs (g): 57 g  Total Protein Estimated Needs (g/kg): 1.3 g/kg        Nutrition Diagnosis   Malnutrition Diagnosis  Patient has Malnutrition Diagnosis: Yes  Diagnosis Status: New  Malnutrition Diagnosis: Moderate malnutrition related to chronic disease or condition  As Evidenced by: presumed muscle wasting and depletion of fat stores, weight loss, poor oral intake    Nutrition Diagnosis  Patient has Nutrition Diagnosis: Yes  Diagnosis Status (1): New  Nutrition Diagnosis 1: Swallowing difficulity  Related to (1): GE junction adenocarcinoma  As Evidenced by (1): dysphagia    Nutrition Interventions/Recommendations   Nutrition Prescription  Individualized Nutrition Prescription Provided for : soft/puree/FL diet, high calorie high protein    Food and Nutrition  Delivery  Food and Nutrition Delivery  Meals & Snacks: Texture-Modified Diet, Protein-modified diet, Energy-modified diet  Goals: modify texture of foods, soft, puree or FL. Thin and moisten with gravy, sauces, broth, milk. Include high calorie foods or additions to foods and high protein foods. Avoid tough meats, dry harsh foods, acidic and spicy foods. Small portions, eat every 1 1/2 -2 hours. Reviewed protein sources. She doesn't like yogurt, consider blending with fruit/smoothie.  Medical Food Supplement: Commercial beverage  Goals: advised an ONS with higher caloric content. Suggested ENU would be a good fit at 8 oz, 400 eileen and 20 gm protein. Provided ordering info. Additionally a plus supplement or Ensure Complete. Continue to drink a small amount between meals with goal of consuming 1/day  Vitamin Supplement Therapy:  (recommend liquid MV)    Nutrition Education  Nutrition Education  Nutrition Education Content: Content related nutrition education  Goals: [facility] Your Eating Guide for People with Esophageal Cancer, andonc soft and moist protein foods, Foods that are easy to chew and swallow. Emailed to daughter Kristy    Coordination of Care         Nutrition Monitoring and Evaluation   Food/Nutrient Related History Monitoring  Monitoring and Evaluation Plan: Energy intake, Meal/snack pattern, Protein intake, Fluid intake, Amount of food  Energy Intake: Estimated energy intake  Criteria: Meet >75% estimated energy needs- food recall  Fluid Intake: Estimated fluid intake  Criteria: maintain hydration  Amount of Food: Medical food intake  Criteria: ONS as recommeded  Meal/Snack Pattern: Estimated meal and snack pattern  Criteria: 4-6 meals/snacks daily  Estimated protein intake: Estimated protein intake  Criteria: meet >75% estimated protein needs - food recall  Body Composition/Growth/Weight History  Monitoring and Evaluation Plan: Weight  Weight: Measured weight  Criteria: maintain weight  Nutrition Focused  Physical Findings  Monitoring and Evaluation Plan: Digestive System  Digestive System: Other (Comment) (reflux, regurgitation)  Criteria: manage symptoms, adhere to regime          Time Spent  Prep time on day of patient encounter: 5 minutes  Time spent directly with patient, family or caregiver: 20 minutes  Additional Time Spent on Patient Care Activities: 5 minutes  Documentation Time: 30 minutes  Other Time Spent: 0 minutes  Total: 60 minutes      Contact information provided  Following as needed

## 2025-01-13 NOTE — PROGRESS NOTES
Patient ID: Ramirez Dobbins is a 89 y.o. female.    Diagnoses:   1. GE junction adenocarcinoma, deficient MMR, with lung metastasis(rt. Perihilar)- stage IV.  2. Right sided breast cancer on tamoxifen (no surgery performed)  3. Atrial fibrillation, HTN    Genomic profile: dMMR by IHC    Assessment and Plan:  This is a 89-year-old very pleasant woman with a new diagnosis of GE junction adenocarcinoma.  On CT chest, there is a new perihilar right upper lobe mass.  It is unclear if the perihilar mass is a primary lung cancer versus a metastatic lesion from the GE junction adenocarcinoma. PET scan is pending.  She is accompanied by her daughter.  She has clearly expressed that she is not interested in radical treatment like surgery and chemotherapy which can likely make her quite sick.  Given her advanced age and comorbidities, I concur.  She has significant swallowing difficulties.  Her overall ECOG performance status is 2.    I discussed that the right perihilar mass could be a primary lung cancer.  To differentiate between metastatic esophageal cancer versus primary lung cancer,  a biopsy of the mass would be necessary, possibly a bronchoscopic biopsy.  She, however, is not willing to proceed with another biopsy.  In light of that, I proposed an alternative plan which is treating her with immunotherapy, and if the perihilar mass does not respond to that, we can further investigate the mass or radiate the mass.       I recommended the following at this time-  1.  I ordered a PET CT scan.  2.  I referred her to radiation oncology.  I think at least palliative radiation is a consideration.  3.  I made a referral to dietitian.  4.  I will order single agent pembrolizumab for her and we will try to get it covered by the insurance.  5.  Follow-up in 3 weeks to finalize the plan.    Follow up plan-in 1 week for a phone appointment to finalize the plan.    I have placed all orders as outlined above. I advised the patient to  schedule the tests and follow-up appointment as discussed by contacting the  on the way out or calling by phone.    Providers:  Surgeon:   Branden:  Alyce:    Chief complaint: GE junction adenocarcinoma.    HPI:  ONCOLOGIC HISTORY-    June/July 2024: Developed progressive dysphagia.    12/23/2025: CT abdomen/pelvis showed-  IMPRESSION:  1.  Interval worsening multilevel compression deformities, most prominent T10 and L1 vertebral bodies, since 2023.  2. Large hiatal hernia.  3. Other stable findings as described above.      January 9, 2025: EGD confirmed a partially obstructing mass at the GE junction.  Biopsy confirmed adenocarcinoma.    January 11, 2025: CT chest showed the following-  IMPRESSION:  New perihilar right upper lobe mass measures 41 x 38 x 30 mm, suspect  for primary malignancy. No postobstructive pneumonia associated with  It. There are not multiple pulmonary nodules to suggest metastatic disease.    No thoracic adenopathy, pleural or pericardial effusion.    Interval history:   Her main concern is dysphagia.  However, she can swallow soft food like cream of wheat reasonably okay.  She complains of losing about 10 to 15 pounds in the last 6 months.    Past Medical History:   Past Medical History:  10/05/2023: Acetabulum fracture, left  05/24/2022: Body mass index (BMI) 21.0-21.9, adult      Comment:  BMI 21.0-21.9, adult  No date: Breast cancer (Multi)      Comment:  right  04/05/2016: Cough, unspecified      Comment:  Cough  No date: Encounter for screening mammogram for malignant neoplasm of   breast      Comment:  Visit for screening mammogram  07/01/2020: Iliotibial band syndrome, left leg      Comment:  Iliotibial band syndrome of left side  07/08/2021: Other forms of dyspnea      Comment:  Dyspnea on exertion  01/02/2015: Pain in unspecified hip      Comment:  Joint pain, hip  04/29/2015: Personal history of other diseases of the respiratory   system      Comment:  History of acute  sinusitis  04/20/2016: Personal history of other diseases of the respiratory   system      Comment:  History of acute bronchitis  06/23/2015: Personal history of other specified conditions      Comment:  History of vertigo  09/01/2016: Personal history of urinary (tract) infections      Comment:  History of urinary tract infection  11/15/2013: Personal history of urinary (tract) infections      Comment:  History of urinary tract infection  06/03/2016: Sprain of unspecified ligament of left ankle, initial   encounter      Comment:  Left ankle sprain  06/27/2013: Sprain of unspecified part of unspecified wrist and hand,   initial encounter      Comment:  Hand sprain   Surgical History:    Past Surgical History:   Procedure Laterality Date    CHOLECYSTECTOMY  05/05/2016    Cholecystectomy    JOINT REPLACEMENT Left 2014    total hip surgery    OTHER SURGICAL HISTORY Left 04/01/2024    cut polyps out of ear      Family History:    Family History   Problem Relation Name Age of Onset    Diabetes Father      Breast cancer Sister       Family Oncology History:    Cancer-related family history includes Breast cancer in her sister.  Social History:    Social History     Tobacco Use    Smoking status: Former     Types: Cigarettes     Passive exposure: Past    Smokeless tobacco: Never   Vaping Use    Vaping status: Never Used   Substance Use Topics    Alcohol use: Not Currently    Drug use: Never        Allergies  Allergies   Allergen Reactions    Codeine Hallucinations    Nitrofurantoin Dizziness and Hallucinations    Penicillins Hives    Albuterol Unknown    Sulfamethoxazole-Trimethoprim Unknown        Medications  Current Outpatient Medications   Medication Instructions    amLODIPine (NORVASC) 5 mg, oral, Daily    cholecalciferol (Vitamin D-3) 50 MCG (2000 UT) tablet 1 tablet, Daily    Eliquis 2.5 mg, oral, Every 12 hours    famotidine (PEPCID) 20 mg, oral, 2 times daily    fluticasone (Flonase) 50 mcg/actuation nasal spray 1  spray, Each Nostril, Daily, Shake gently. Before first use, prime pump. After use, clean tip and replace cap.    fluticasone-umeclidin-vilanter (Trelegy Ellipta) 200-62.5-25 mcg blister with device 1 puff, inhalation, Daily    levalbuterol (Xopenex) 45 mcg/actuation inhaler 1-2 puffs, inhalation, Every 4 hours PRN    melatonin 1 mg tablet,chewable Chew.    methocarbamol (ROBAXIN) 250 mg, oral, 3 times daily PRN    metoprolol succinate XL (TOPROL-XL) 200 mg, oral, Daily    omeprazole (PriLOSEC) 20 mg DR capsule 1 capsule, Daily (0630)    sennosides (Senokot) 8.6 mg tablet 1 tablet, Daily    sennosides-docusate sodium (Rosa Maria-Colace) 8.6-50 mg tablet 1 tablet, Daily    sucralfate (Carafate) 100 mg/mL suspension PLEASE SEE ATTACHED FOR DETAILED DIRECTIONS    tamoxifen (NOLVADEX) 20 mg, oral, Daily          Objective   VS: /57 (BP Location: Right arm, Patient Position: Sitting, BP Cuff Size: Adult long)   Pulse 67   Temp 35.8 °C (96.4 °F) (Temporal)   Resp 18   Wt (!) 44.1 kg (97 lb 3.6 oz)   SpO2 93%   BMI 18.37 kg/m²   Weight:   Vitals:    01/13/25 1059   Weight: (!) 44.1 kg (97 lb 3.6 oz)        PHYSICAL EXAMINATION  ECOG performance status-2  VS:  /57 (BP Location: Right arm, Patient Position: Sitting, BP Cuff Size: Adult long)   Pulse 67   Temp 35.8 °C (96.4 °F) (Temporal)   Resp 18   Wt (!) 44.1 kg (97 lb 3.6 oz)   SpO2 93%   BMI 18.37 kg/m²   Weight:   Vitals:    01/13/25 1059   Weight: (!) 44.1 kg (97 lb 3.6 oz)       BSA: 1.38 meters squared   Pain Scale: 1-2    Constitutional: Awake/alert/oriented x3, cooperative and answers questions appropriately.     Eyes: No pallor, conjunctival injection, clear sclera.    Mouth: No ulceration. No thrush.     Head/Neck: Neck supple, no apparent injury, thyroid without mass or tenderness, No JVD, trachea midline, no bruits.     Respiratory/Thorax: Normal breath sounds with bilaterally symmetrical chest expansion. No dullness.      Cardiovascular: No  audible murmurs, normal heart sounds. No pericardial rub.     Gastrointestinal: Nondistended, soft, non-tender, no rebound tenderness or guarding, no masses palpable, no organomegaly, +BS, no bruits. No ascites.     Musculoskeletal: No joint swelling, redness.      Extremities: normal extremities, no cyanosis edema, contusions or wounds, no clubbing.     Lymphatic: No significant lymphadenopathy.     Skin: Warm and dry, no lesions, no rashes.     Labs                         Image       This note was created using a voice recognition system ( the Dragon dictation system). Inaccuracies and misspellings are unintentional.     Chris Renner MD.

## 2025-01-13 NOTE — PROGRESS NOTES
Patient ambulated into clinic with a wheeled walker for new patient visit with Dr. De Leon accompanied by her daughter Kristy. Medications and allergies reviewed.     Stated she said she went to eat toast and it got stuck going down and she had abdominal pain in her stomach which then she had a scan and EGD completed. Noticed difficulty swallowing last year, had a swallow study completed in 2024.   Stated losing weight about 6 months. She has lost 10-15 lbs.   Reports she is eating soft food, small amounts.     Referral to Radiation Oncology.     PET/CT ordered.     Follow up in 3 weeks.

## 2025-01-15 ENCOUNTER — HOSPITAL ENCOUNTER (OUTPATIENT)
Dept: RADIOLOGY | Facility: CLINIC | Age: OVER 89
Discharge: HOME | End: 2025-01-15
Payer: MEDICARE

## 2025-01-15 DIAGNOSIS — C80.1 ADENOCARCINOMA (MULTI): ICD-10-CM

## 2025-01-15 DIAGNOSIS — M46.1 SI JOINT ARTHRITIS: ICD-10-CM

## 2025-01-15 DIAGNOSIS — M16.51 POST-TRAUMATIC OSTEOARTHRITIS OF RIGHT HIP: ICD-10-CM

## 2025-01-15 PROCEDURE — 78815 PET IMAGE W/CT SKULL-THIGH: CPT | Mod: PS

## 2025-01-15 PROCEDURE — 78815 PET IMAGE W/CT SKULL-THIGH: CPT | Performed by: STUDENT IN AN ORGANIZED HEALTH CARE EDUCATION/TRAINING PROGRAM

## 2025-01-15 PROCEDURE — 3430000001 HC RX 343 DIAGNOSTIC RADIOPHARMACEUTICALS: Performed by: INTERNAL MEDICINE

## 2025-01-15 PROCEDURE — A9552 F18 FDG: HCPCS | Performed by: INTERNAL MEDICINE

## 2025-01-15 RX ORDER — FLUDEOXYGLUCOSE F 18 200 MCI/ML
11.4 INJECTION, SOLUTION INTRAVENOUS
Status: COMPLETED | OUTPATIENT
Start: 2025-01-15 | End: 2025-01-15

## 2025-01-15 RX ORDER — METHOCARBAMOL 500 MG/1
250 TABLET, FILM COATED ORAL 3 TIMES DAILY PRN
Qty: 40 TABLET | Refills: 2 | Status: SHIPPED | OUTPATIENT
Start: 2025-01-15 | End: 2025-03-16

## 2025-01-15 RX ADMIN — FLUDEOXYGLUCOSE F 18 11.4 MILLICURIE: 200 INJECTION, SOLUTION INTRAVENOUS at 11:00

## 2025-01-17 LAB
ELECTRONICALLY SIGNED BY: NORMAL
MLH1 METHYLATION RESULT: NORMAL

## 2025-01-20 ENCOUNTER — TELEMEDICINE (OUTPATIENT)
Dept: HEMATOLOGY/ONCOLOGY | Facility: CLINIC | Age: OVER 89
End: 2025-01-20
Payer: MEDICARE

## 2025-01-20 DIAGNOSIS — C80.1 ADENOCARCINOMA (MULTI): Primary | ICD-10-CM

## 2025-01-20 LAB
LAB AP ASR DISCLAIMER: NORMAL
LABORATORY COMMENT REPORT: NORMAL
PATH REPORT.ADDENDUM SPEC: NORMAL
PATH REPORT.COMMENTS IMP SPEC: NORMAL
PATH REPORT.FINAL DX SPEC: NORMAL
PATH REPORT.GROSS SPEC: NORMAL
PATH REPORT.TOTAL CANCER: NORMAL

## 2025-01-20 PROCEDURE — 1157F ADVNC CARE PLAN IN RCRD: CPT | Performed by: INTERNAL MEDICINE

## 2025-01-20 PROCEDURE — 1126F AMNT PAIN NOTED NONE PRSNT: CPT | Performed by: INTERNAL MEDICINE

## 2025-01-20 PROCEDURE — 99213 OFFICE O/P EST LOW 20 MIN: CPT | Performed by: INTERNAL MEDICINE

## 2025-01-20 ASSESSMENT — PAIN SCALES - GENERAL: PAINLEVEL_OUTOF10: 0-NO PAIN

## 2025-01-20 NOTE — PROGRESS NOTES
Patient ID: Ramirez Dobbins is a 89 y.o. female.  This was a phone appointment.  Diagnoses:   1. GE junction adenocarcinoma, deficient MMR, with lung metastasis(rt. Perihilar)- stage IV.  2. Right sided breast cancer on tamoxifen (no surgery performed)  3. Atrial fibrillation, HTN    Genomic profile: dMMR by IHC    Assessment and Plan:  This is a 89-year-old very pleasant woman with a new diagnosis of GE junction adenocarcinoma.  On CT chest, there is a new perihilar right upper lobe mass.  It is unclear if the perihilar mass is a primary lung cancer versus a metastatic lesion from the GE junction adenocarcinoma. She has clearly expressed that she is not interested in radical treatment like surgery and chemotherapy which can likely make her quite sick.  Given her advanced age and comorbidities, I concur.  She has significant swallowing difficulties.  Her overall ECOG performance status is 2.    I discussed that the right perihilar mass could be a primary lung cancer.  To differentiate between metastatic esophageal cancer versus primary lung cancer,  a biopsy of the mass would be necessary, possibly a bronchoscopic biopsy.  She, however, is not willing to proceed with another biopsy.  In light of that, I proposed an alternative plan which is treating her with immunotherapy, and if the perihilar mass does not respond to that, we can further investigate the mass or radiate the mass.  I recommended immunotherapy as preferred option.  If she does not respond to immunotherapy, radiation should be considered.  She has not appointment with radiation oncology later this week.    I have ordered pembrolizumab for her which has not been cleared by her insurance yet.  I plan to start pembrolizumab as soon as it is cleared by her insurance.    Follow up plan-in 1 -2 weeks to start pembrolizumab.    I have placed all orders as outlined above. I advised the patient to schedule the tests and follow-up appointment as discussed by  contacting the  on the way out or calling by phone.    Providers:  Surgeon:   Branden:  Alyce:    Chief complaint: GE junction adenocarcinoma.    HPI:  ONCOLOGIC HISTORY-    June/July 2024: Developed progressive dysphagia.    12/23/2025: CT abdomen/pelvis showed-  IMPRESSION:  1.  Interval worsening multilevel compression deformities, most prominent T10 and L1 vertebral bodies, since 2023.  2. Large hiatal hernia.  3. Other stable findings as described above.      January 9, 2025: EGD confirmed a partially obstructing mass at the GE junction.  Biopsy confirmed adenocarcinoma.    January 11, 2025: CT chest showed the following-  IMPRESSION:  New perihilar right upper lobe mass measures 41 x 38 x 30 mm, suspect  for primary malignancy. No postobstructive pneumonia associated with  It. There are not multiple pulmonary nodules to suggest metastatic disease.    No thoracic adenopathy, pleural or pericardial effusion.      January 15, 2025: PET CT scan showed the following-    IMPRESSION:  1. FDG avid circumferential wall thickening involving distal  esophagus and gastroesophageal junction, consistent with known  adenocarcinoma.  2. FDG avid perigastric node, likely representing harshal metastasis.  3. FDG avid right upper lobe perihilar mass, primary versus  metastasis.  4. FDG avid right breast soft tissue lesion, consistent with known  breast cancer.    Interval history:   Her main concern is dysphagia.  However, she can swallow soft food like cream of wheat reasonably okay.  She complains of losing about 10 to 15 pounds in the last 6 months.    Past Medical History:   Past Medical History:  10/05/2023: Acetabulum fracture, left  05/24/2022: Body mass index (BMI) 21.0-21.9, adult      Comment:  BMI 21.0-21.9, adult  No date: Breast cancer (Multi)      Comment:  right  04/05/2016: Cough, unspecified      Comment:  Cough  No date: Encounter for screening mammogram for malignant neoplasm of   breast      Comment:   Visit for screening mammogram  07/01/2020: Iliotibial band syndrome, left leg      Comment:  Iliotibial band syndrome of left side  07/08/2021: Other forms of dyspnea      Comment:  Dyspnea on exertion  01/02/2015: Pain in unspecified hip      Comment:  Joint pain, hip  04/29/2015: Personal history of other diseases of the respiratory   system      Comment:  History of acute sinusitis  04/20/2016: Personal history of other diseases of the respiratory   system      Comment:  History of acute bronchitis  06/23/2015: Personal history of other specified conditions      Comment:  History of vertigo  09/01/2016: Personal history of urinary (tract) infections      Comment:  History of urinary tract infection  11/15/2013: Personal history of urinary (tract) infections      Comment:  History of urinary tract infection  06/03/2016: Sprain of unspecified ligament of left ankle, initial   encounter      Comment:  Left ankle sprain  06/27/2013: Sprain of unspecified part of unspecified wrist and hand,   initial encounter      Comment:  Hand sprain   Surgical History:    Past Surgical History:   Procedure Laterality Date    CHOLECYSTECTOMY  05/05/2016    Cholecystectomy    JOINT REPLACEMENT Left 2014    total hip surgery    OTHER SURGICAL HISTORY Left 04/01/2024    cut polyps out of ear      Family History:    Family History   Problem Relation Name Age of Onset    Diabetes Father      Breast cancer Sister       Family Oncology History:    Cancer-related family history includes Breast cancer in her sister.  Social History:    Social History     Tobacco Use    Smoking status: Former     Types: Cigarettes     Passive exposure: Past    Smokeless tobacco: Never   Vaping Use    Vaping status: Never Used   Substance Use Topics    Alcohol use: Not Currently    Drug use: Never        Allergies  Allergies   Allergen Reactions    Codeine Hallucinations    Nitrofurantoin Dizziness and Hallucinations    Penicillins Hives    Albuterol Unknown     Sulfamethoxazole-Trimethoprim Unknown        Medications  Current Outpatient Medications   Medication Instructions    amLODIPine (NORVASC) 5 mg, oral, Daily    cholecalciferol (Vitamin D-3) 50 MCG (2000 UT) tablet 1 tablet, Daily    Eliquis 2.5 mg, oral, Every 12 hours    famotidine (PEPCID) 20 mg, oral, 2 times daily    fluticasone (Flonase) 50 mcg/actuation nasal spray 1 spray, Each Nostril, Daily, Shake gently. Before first use, prime pump. After use, clean tip and replace cap.    fluticasone-umeclidin-vilanter (Trelegy Ellipta) 200-62.5-25 mcg blister with device 1 puff, inhalation, Daily    levalbuterol (Xopenex) 45 mcg/actuation inhaler 1-2 puffs, inhalation, Every 4 hours PRN    melatonin 1 mg tablet,chewable Chew.    methocarbamol (ROBAXIN) 250 mg, oral, 3 times daily PRN    metoprolol succinate XL (TOPROL-XL) 200 mg, oral, Daily    omeprazole (PriLOSEC) 20 mg DR capsule 1 capsule, Daily (0630)    sennosides (Senokot) 8.6 mg tablet 1 tablet, Daily    sennosides-docusate sodium (Rosa Maria-Colace) 8.6-50 mg tablet 1 tablet, Daily    sucralfate (Carafate) 100 mg/mL suspension PLEASE SEE ATTACHED FOR DETAILED DIRECTIONS    tamoxifen (NOLVADEX) 20 mg, oral, Daily          Objective   VS: There were no vitals taken for this visit.  Weight:   There were no vitals filed for this visit.       PHYSICAL EXAMINATION      Labs                         Image       This note was created using a voice recognition system ( the Dragon dictation system). Inaccuracies and misspellings are unintentional.   Total time spent 20 minutes.  Chris Renner MD.

## 2025-01-21 ENCOUNTER — PATIENT MESSAGE (OUTPATIENT)
Dept: PRIMARY CARE | Facility: CLINIC | Age: OVER 89
End: 2025-01-21
Payer: MEDICARE

## 2025-01-21 DIAGNOSIS — K21.9 GASTRIC REFLUX: Primary | ICD-10-CM

## 2025-01-22 ENCOUNTER — TELEPHONE (OUTPATIENT)
Dept: PRIMARY CARE | Facility: CLINIC | Age: OVER 89
End: 2025-01-22
Payer: MEDICARE

## 2025-01-22 DIAGNOSIS — C80.1 ADENOCARCINOMA (MULTI): Primary | ICD-10-CM

## 2025-01-22 RX ORDER — SUCRALFATE 1 G/10ML
1 SUSPENSION ORAL
Qty: 1200 ML | Refills: 3 | Status: SHIPPED | OUTPATIENT
Start: 2025-01-22 | End: 2025-02-21

## 2025-01-22 NOTE — TELEPHONE ENCOUNTER
[facility] called this pt family is now requesting for hospice, she has been diagnosed with stage 4 cancer. Their fax number is [phone]

## 2025-01-22 NOTE — TELEPHONE ENCOUNTER
Marilee [phone] F; [phone] is calling about the referral sent over, she says they need her demographic, med list, progress notes and any other pertinent info

## 2025-01-23 ENCOUNTER — HOSPITAL ENCOUNTER (OUTPATIENT)
Dept: RADIATION ONCOLOGY | Facility: CLINIC | Age: OVER 89
Setting detail: RADIATION/ONCOLOGY SERIES
Discharge: HOME | End: 2025-01-23
Payer: MEDICARE

## 2025-01-23 VITALS
WEIGHT: 99.65 LBS | TEMPERATURE: 97.9 F | HEART RATE: 74 BPM | OXYGEN SATURATION: 92 % | DIASTOLIC BLOOD PRESSURE: 80 MMHG | RESPIRATION RATE: 18 BRPM | BODY MASS INDEX: 18.83 KG/M2 | SYSTOLIC BLOOD PRESSURE: 154 MMHG

## 2025-01-23 DIAGNOSIS — C80.1 ADENOCARCINOMA (MULTI): ICD-10-CM

## 2025-01-23 DIAGNOSIS — C15.5 MALIGNANT NEOPLASM OF LOWER THIRD OF ESOPHAGUS (MULTI): Primary | ICD-10-CM

## 2025-01-23 PROCEDURE — 99205 OFFICE O/P NEW HI 60 MIN: CPT | Performed by: STUDENT IN AN ORGANIZED HEALTH CARE EDUCATION/TRAINING PROGRAM

## 2025-01-23 PROCEDURE — 99215 OFFICE O/P EST HI 40 MIN: CPT | Performed by: STUDENT IN AN ORGANIZED HEALTH CARE EDUCATION/TRAINING PROGRAM

## 2025-01-23 RX ORDER — ONDANSETRON 8 MG/1
8 TABLET, ORALLY DISINTEGRATING ORAL EVERY 8 HOURS PRN
Qty: 20 TABLET | Refills: 0 | Status: SHIPPED | OUTPATIENT
Start: 2025-01-23 | End: 2025-01-30

## 2025-01-23 SDOH — ECONOMIC STABILITY: FOOD INSECURITY: WITHIN THE PAST 12 MONTHS, YOU WORRIED THAT YOUR FOOD WOULD RUN OUT BEFORE YOU GOT MONEY TO BUY MORE.: NEVER TRUE

## 2025-01-23 SDOH — ECONOMIC STABILITY: INCOME INSECURITY: IN THE LAST 12 MONTHS, WAS THERE A TIME WHEN YOU WERE NOT ABLE TO PAY THE MORTGAGE OR RENT ON TIME?: NO

## 2025-01-23 SDOH — ECONOMIC STABILITY: TRANSPORTATION INSECURITY
IN THE PAST 12 MONTHS, HAS LACK OF TRANSPORTATION KEPT YOU FROM MEETINGS, WORK, OR FROM GETTING THINGS NEEDED FOR DAILY LIVING?: NO

## 2025-01-23 SDOH — ECONOMIC STABILITY: FOOD INSECURITY: WITHIN THE PAST 12 MONTHS, THE FOOD YOU BOUGHT JUST DIDN'T LAST AND YOU DIDN'T HAVE MONEY TO GET MORE.: NEVER TRUE

## 2025-01-23 SDOH — ECONOMIC STABILITY: TRANSPORTATION INSECURITY
IN THE PAST 12 MONTHS, HAS THE LACK OF TRANSPORTATION KEPT YOU FROM MEDICAL APPOINTMENTS OR FROM GETTING MEDICATIONS?: NO

## 2025-01-23 ASSESSMENT — ENCOUNTER SYMPTOMS
LOSS OF SENSATION IN FEET: 0
HEMATOLOGIC/LYMPHATIC NEGATIVE: 1
SHORTNESS OF BREATH: 1
PSYCHIATRIC NEGATIVE: 1
GASTROINTESTINAL NEGATIVE: 1
ENDOCRINE NEGATIVE: 1
CARDIOVASCULAR NEGATIVE: 1
COUGH: 1
OCCASIONAL FEELINGS OF UNSTEADINESS: 1
EYES NEGATIVE: 1
FATIGUE: 1

## 2025-01-23 ASSESSMENT — COLUMBIA-SUICIDE SEVERITY RATING SCALE - C-SSRS
6. HAVE YOU EVER DONE ANYTHING, STARTED TO DO ANYTHING, OR PREPARED TO DO ANYTHING TO END YOUR LIFE?: NO
1. IN THE PAST MONTH, HAVE YOU WISHED YOU WERE DEAD OR WISHED YOU COULD GO TO SLEEP AND NOT WAKE UP?: NO
2. HAVE YOU ACTUALLY HAD ANY THOUGHTS OF KILLING YOURSELF?: NO

## 2025-01-23 ASSESSMENT — PAIN SCALES - GENERAL: PAINLEVEL_OUTOF10: 0-NO PAIN

## 2025-01-23 NOTE — PROGRESS NOTES
Radiation Oncology Outpatient Consult    Patient Name:  Ramirez Dobbins  MRN:  [id number]  :  6/10/1933    Referring Provider: Chris Renner MD  Primary Care Provider: Jose Schafer DO  Care Team: Patient Care Team:  Jose Schafer DO as PCP - General (Family Medicine)  Luis Umaña MD as Consulting Physician (Hematology and Oncology)  Blayne Page MD as Consulting Physician (Cardiology)  Chris Renner MD as Consulting Physician (Hematology and Oncology)    Date of Service: 2025     SUBJECTIVE  History of Present Illness:  Ramirez Dobbins is a 89 y.o. female who was referred by Chris Renner MD, for a consultation to the [facility] Department of Radiation Oncology.  She is presenting for evaluation and management of esophagus adenocarcinoma, MMR deficient.     #) cTx cN1 cM0, Poorly differentiated adenocarcinoma, MMR deficient  #) Right upper lobe perihilar mass, concerning for malignancy, uncertain etiology primary versus metastatic  #) Right invasive ductal carcinoma the right breast, hormone positive, HER2 negative, cT2 cN0 cM0 on palliative endocrine blocking therapy    Ms. Dobbins is a postmenopausal female with a remote history of hormone positive, HER2 negative right breast cancer treated with endocrine blocking therapy alone.  The patient was concern for weight loss and underwent EGD on 2024 which revealed large hiatal hernia with Z-line at 30 cm from the incisors GEJ noted at 30 cm, medium size, ulcerated mass with no bleeding or stigmata of recent bleeding at the esophageal gastric junction 30 cm from the incisors the mass was not obstructing and circumferential biopsies were obtained.  Moderate inflammation in the gastric body, fundus and  antrum.  Biopsy of the stomach revealed chronic inflammation without evidence of H. pylori.  The esophogastric junction mass revealed poorly differentiated adenocarcinoma with intestinal metaplasia.   The patient was found to have MMR deficient with presence of MLH1 promoter methylation.    The patient underwent staging PET/CT on 1/15/2025 which showed hypermetabolic right upper lobe perihilar mass concerning for metastatic disease versus primary malignancy, thickening and hypermetabolic activity of the gastroesophageal junction with avid subcentimeter perigastric lymph node and additional right breast mass consistent with her breast primary.    The patient has met with medical oncology and is planned for palliative immunotherapy.  The patient presents today for discussion of radiation therapy options for her esophagus cancer.    The patient declines palliative immunotherapy, currently.    Prior Radiotherapy:  No radiation treatments to show. (Treatments may have been administered in another system.)       Past Medical History:    Past Medical History:   Diagnosis Date    Acetabulum fracture, left 10/05/2023    Body mass index (BMI) 21.0-21.9, adult 05/24/2022    BMI 21.0-21.9, adult    Breast cancer (Multi)     right    Cough, unspecified 04/05/2016    Cough    Encounter for screening mammogram for malignant neoplasm of breast     Visit for screening mammogram    Iliotibial band syndrome, left leg 07/01/2020    Iliotibial band syndrome of left side    Other forms of dyspnea 07/08/2021    Dyspnea on exertion    Pain in unspecified hip 01/02/2015    Joint pain, hip    Personal history of other diseases of the respiratory system 04/29/2015    History of acute sinusitis    Personal history of other diseases of the respiratory system 04/20/2016    History of acute bronchitis    Personal history of other specified conditions 06/23/2015    History of vertigo    Personal history of urinary (tract) infections 09/01/2016    History of urinary tract infection    Personal history of urinary (tract) infections 11/15/2013    History of urinary tract infection    Sprain of unspecified ligament of left ankle, initial encounter  06/03/2016    Left ankle sprain    Sprain of unspecified part of unspecified wrist and hand, initial encounter 06/27/2013    Hand sprain        Past Surgical History:    Past Surgical History:   Procedure Laterality Date    CHOLECYSTECTOMY  05/05/2016    Cholecystectomy    JOINT REPLACEMENT Left 2014    total hip surgery    OTHER SURGICAL HISTORY Left 04/01/2024    cut polyps out of ear        Family History:  Cancer-related family history includes Breast cancer in her sister.    Social History:    Social History     Tobacco Use    Smoking status: Former     Types: Cigarettes     Passive exposure: Past    Smokeless tobacco: Never   Vaping Use    Vaping status: Never Used   Substance Use Topics    Alcohol use: Not Currently    Drug use: Never       Allergies:    Allergies   Allergen Reactions    Codeine Hallucinations    Nitrofurantoin Dizziness and Hallucinations    Penicillins Hives    Albuterol Unknown    Sulfamethoxazole-Trimethoprim Unknown        Medications:    Current Outpatient Medications:     amLODIPine (Norvasc) 5 mg tablet, TAKE 1 TABLET BY MOUTH EVERY DAY, Disp: 90 tablet, Rfl: 1    cholecalciferol (Vitamin D-3) 50 MCG (2000 UT) tablet, Take 1 tablet (2,000 Units) by mouth once daily., Disp: , Rfl:     Eliquis 2.5 mg tablet, TAKE 1 TABLET BY MOUTH EVERY 12 HOURS, Disp: 180 tablet, Rfl: 3    famotidine (Pepcid) 20 mg tablet, TAKE 1 TABLET BY MOUTH TWICE A DAY, Disp: 180 tablet, Rfl: 1    fluticasone (Flonase) 50 mcg/actuation nasal spray, Administer 1 spray into each nostril once daily. Shake gently. Before first use, prime pump. After use, clean tip and replace cap. (Patient taking differently: Administer 1 spray into each nostril if needed. Shake gently. Before first use, prime pump. After use, clean tip and replace cap.), Disp: 16 g, Rfl: 5    fluticasone-umeclidin-vilanter (Trelegy Ellipta) 200-62.5-25 mcg blister with device, Inhale 1 puff once daily., Disp: 60 each, Rfl: 11    levalbuterol  (Xopenex) 45 mcg/actuation inhaler, Inhale 1-2 puffs every 4 hours if needed for wheezing or shortness of breath., Disp: 15 g, Rfl: 2    melatonin 1 mg tablet,chewable, Chew., Disp: , Rfl:     methocarbamol (Robaxin) 500 mg tablet, TAKE 0.5 TABLETS (250 MG) BY MOUTH 3 TIMES A DAY AS NEEDED FOR MUSCLE SPASMS., Disp: 40 tablet, Rfl: 2    metoprolol succinate XL (Toprol-XL) 200 mg 24 hr tablet, TAKE 1 TABLET BY MOUTH EVERY DAY, Disp: 90 tablet, Rfl: 1    omeprazole (PriLOSEC) 20 mg DR capsule, Take 1 capsule (20 mg) by mouth early in the morning.., Disp: , Rfl:     sennosides (Senokot) 8.6 mg tablet, Take 1 tablet (8.6 mg) by mouth once daily., Disp: , Rfl:     sennosides-docusate sodium (Rosa Maria-Colace) 8.6-50 mg tablet, Take 1 tablet by mouth once daily. (Patient not taking: Reported on 1/13/2025), Disp: , Rfl:     sucralfate (Carafate) 100 mg/mL suspension, Take 10 mL (1 g) by mouth 4 times a day with meals., Disp: 1200 mL, Rfl: 3    tamoxifen (Nolvadex) 20 mg tablet, Take 1 tablet (20 mg total) by mouth once daily., Disp: 90 tablet, Rfl: 2      Review of Systems:  Review of Systems   Constitutional:  Positive for fatigue.   HENT:   Positive for tinnitus.    Eyes: Negative.    Respiratory:  Positive for cough and shortness of breath.    Cardiovascular: Negative.    Gastrointestinal: Negative.    Endocrine: Negative.    Genitourinary:  Positive for bladder incontinence.    Musculoskeletal:  Positive for gait problem.   Skin: Negative.    Neurological:  Positive for gait problem.   Hematological: Negative.    Psychiatric/Behavioral: Negative.         Performance Status:  The Karnofsky performance scale today is 70, Cares for self; unable to carry on normal activity or to do active work (ECOG equivalent 1).        OBJECTIVE  There were no vitals taken for this visit.   Physical Exam  Vitals and nursing note reviewed.   Constitutional:       Comments: Thin-appearing   HENT:      Head: Normocephalic.   Eyes:       Extraocular Movements: Extraocular movements intact.   Pulmonary:      Effort: Pulmonary effort is normal.   Skin:     General: Skin is warm.   Neurological:      General: No focal deficit present.      Mental Status: She is alert.          Laboratory Review:  There are no laboratory contraindications to radiation therapy.    The pertinent lab results were reviewed and discussed with the patient.  Notably,     2/19/2024-creatinine: 0.73    Pathology Review:  The pertinent pathology results were reviewed and discussed with the patient.  Notably,     Esophagus  12/17/2024-A. STOMACH, BODY, BIOPSY: - Oxyntic mucosa with mild chronic inactive gastritis.- Negative for Helicobacter.     B. ESOPHAGOGASTRIC JUNCTION, MASS, BIOPSY: - Invasive poorly-differentiated adenocarcinoma involving fragments of cardia-type mucosa with focal intestinal metaplasia Comment: The patient's history of breast ductal carcinoma ([id number]) is noted. Immunohistochemical stains demonstrate that the neoplastic cells are positive for CDX2 and negative for estrogen receptor and GATA3. TRPS1 shows nonspecific weak expression. Although the immunoprofile is not entirely specific, it favors a gastroesophageal origin.       Breast  A.  RIGHT BREAST, CORE NEEDLE BIOPSY: -- INVASIVE DUCTAL CARCINOMA, GRADE 2, Note:  In this limited sample, the invasive carcinoma measures up to 1.2 cm in   greatest dimension.       Disease Associated Genomics:  Esophagus  MMR deficient  MLH-1:          Expression Absent                                PMS-2:         Expression Absent                        MSH-2:         Expression Present                                    MSH-6:         Expression Heterogeneous     RESULT: Positive for MLH1 Promoter Methylation     Breast  ER positive greater than 95%, LA positive greater than 95%, HER2 negative (1+)    Disease Tumor Markers:    Breast  10/15/2024-CA 27.29: 40.0 (H) (7/2024-31.0)     Imaging:  The pertinent imaging  results were reviewed and discussed with the patient.  Notably,     1/15/2025-PET/CT: No evidence of hypermetabolic activity in the cervical lymph nodes.  Avid right upper lobe perihilar mass with max SUV 9.8.  FDG uptake in circumferential thickening of the distal esophagus in gastroesophageal junction with max SUV 11.3.  Avid subcentimeter perigastric lymph node with max SUV 3.5.  Avid soft tissue density within the right breast with max SUV 2.0.  No evidence of additional abdominal pelvic lymphadenopathy.  Status post cholecystectomy.  No evidence of osseous metastatic disease.       ASSESSMENT:   Ramirez Dobbins is a 89 y.o. female with Malignant neoplasm of female breast, Clinical: Stage IB (cT2, cN0, cM0, G2, ER+, OH+, HER2-)    Malignant neoplasm of lower third of esophagus (Multi), Clinical: Stage Unknown (cTX, cN1, cM0, G3).      Ms. Dobbins is a postmenopausal female with a remote history of hormone positive, HER2 negative right breast cancer treated with endocrine blocking therapy alone.  The patient has complicated oncological history of new diagnosis of adenocarcinoma of the lower third of the esophagus and a right upper lobe perihilar mass concerning for lung cancer versus metastatic carcinoma from breast or esophagus.    The patient has met with medical oncology who recommended EBUS and bronchoscopy to biopsy the lung finding; however, the patient declines biopsy at this time.    The patient declines immunotherapy treatment currently; therefore, I would recommend treatment of both the diagnosed esophagus primary for improement in local control and ability to intake orally. I spoke to the patient extensively regarding the need for biopsy to prove malignancy; however, given the central nature of the lesion that treatment of radiation would be appropriate to decrease the risk for airway compromise in the future.    I discussed the expected acute and long term toxicity of radiation therapy to the esophagus /  lung. All questions and concerns were addressed.    PLAN:     #) cTx cN1 cM0, Poorly differentiated adenocarcinoma, MMR deficient  -Plan for palliative radiation therapy 3000 cGy in 10 fractions  -Following with medical oncology, declining palliative immunotherapy    #) Right upper lobe perihilar mass, concerning for malignancy, uncertain etiology primary versus metastatic  -Declining biopsy at this time  -Plan for palliative radiation therapy 3000 cGy in 10 fractions    #) Right invasive ductal carcinoma the right breast, hormone positive, HER2 negative, cT2 cN0 cM0 on palliative endocrine blocking therapy  -Following with medical oncology, on tamoxifen    #) Nutrient deficient malnutrition 2/2 tumor obstruction  -Dietary referral    NCCN Guidelines were applicable to guide this patients treatment plan.     A total of 45 minutes were spent face-to-face with the patient, the majority of time spent detailing treatment options with an additional 15 minutes spent reviewing records including imaging, pathology and physician notes.    Justyn Kellogg MD  Formerly [facility], later [facility] [place]  [facility]  - Department of Radiation Oncology  Phone: [phone]  Fax: [phone]  [facility] secure chat preferred / Pager [phone]    Note: This was transcribed using Dragon voice recognition software. Attempts were made to correct any errors; however, errors or omissions may be present.

## 2025-01-23 NOTE — PROGRESS NOTES
Radiation Oncology Nursing Note    Prior Radiotherapy:  No  No radiation treatments to show. (Treatments may have been administered in another system.)     Current Systemic Treatment:  No     Presence of Pacemaker or ICD:  No    History of Autoimmune or Connective Tissue Disorders:  No    Pain: The patient's current pain level was assessed.  They report currently having a pain of 0 out of 10.  They feel their pain is under control without the use of pain medications.    Review of Systems:  Review of Systems   Constitutional:  Positive for fatigue.   HENT:   Positive for tinnitus.    Eyes: Negative.    Respiratory:  Positive for cough and shortness of breath.    Cardiovascular: Negative.    Gastrointestinal: Negative.    Endocrine: Negative.    Genitourinary:  Positive for bladder incontinence.    Musculoskeletal:  Positive for gait problem.   Skin: Negative.    Neurological:  Positive for gait problem.   Hematological: Negative.    Psychiatric/Behavioral: Negative.         Education Documentation  Radiation Therapy (External Beam) : What Is Radiation Therapy, taught by Jaycee Edmondson RN at 1/23/2025  2:57 PM.  Learner: Family, Patient  Readiness: Acceptance  Method: Explanation  Response: Verbalizes Understanding    Radiation Therapy (External Beam) : Side Effects, taught by Jaycee Edmondson RN at 1/23/2025  2:57 PM.  Learner: Family, Patient  Readiness: Acceptance  Method: Explanation  Response: Verbalizes Understanding    Radiation Therapy (External Beam) : Review, taught by Jaycee Edmondson RN at 1/23/2025  2:57 PM.  Learner: Family, Patient  Readiness: Acceptance  Method: Explanation  Response: Verbalizes Understanding    Radiation Therapy (External Beam) : Life During Treatment, taught by Jaycee Edmondson RN at 1/23/2025  2:57 PM.  Learner: Family, Patient  Readiness: Acceptance  Method: Explanation  Response: Verbalizes Understanding    Radiation Therapy (External Beam) : Getting Radiation, taught by  Jaycee Edmondson RN at 1/23/2025  2:57 PM.  Learner: Family, Patient  Readiness: Acceptance  Method: Explanation  Response: Verbalizes Understanding    Fatigue, taught by Jaycee Edmondson RN at 1/23/2025  2:57 PM.  Learner: Family, Patient  Readiness: Acceptance  Method: Explanation  Response: Verbalizes Understanding    Radiation Therapy, taught by Jaycee Edmondson RN at 1/23/2025  2:57 PM.  Learner: Family, Patient  Readiness: Acceptance  Method: Explanation  Response: Verbalizes Understanding    When and How to Contact Clinic, taught by Jaycee Edmondson RN at 1/23/2025  2:57 PM.  Learner: Family, Patient  Readiness: Acceptance  Method: Explanation  Response: Verbalizes Understanding    Education Comments  01/23/25 1457 Jaycee Edmondson RN  CT sim. Pt was educated on what to expect for CT sim, what to expect when xRT starts, OTV schedule, side effects,  All questions answered. Verbalized understanding using teach back. No further concerns at this time.

## 2025-01-23 NOTE — ADDENDUM NOTE
Encounter addended by: Jaycee Edmondson RN on: 1/23/2025 2:58 PM   Actions taken: Patient Education assessment filed, Patient Education title added, Patient Education documented on, Education note filed, Clinical Note Signed

## 2025-01-24 ENCOUNTER — SOCIAL WORK (OUTPATIENT)
Dept: CASE MANAGEMENT | Facility: HOSPITAL | Age: OVER 89
End: 2025-01-24
Payer: MEDICARE

## 2025-01-24 NOTE — PROGRESS NOTES
SW met with patient on 1/23/25. Her dtr, Kristy, from [place] was with her. Pt lives with her son and dtr-in-law in [place]. Pt has now seen both med/onc and rad/onc this week. Pt scored high on the distress screen but SW is unsure why. It is unknown why the score was high but SW did not observe any signs of distress and Pt voiced to concerns. She is actually quite independent and seems content.   Pt reports that she found a lump in her breast right as COVID began and she did not want to seek any treatment at that time due to exposure. It was a year later that this was addressed. Pt reports that she had no treatment but is on an aromatase inhibitor. She now has cancer of he GE junction and has a spot on her lung. She reports that it suspected that all of these are primary cancers. She is choosing not to biopsy the lung. She reports that she is going to have radiation tx but has chosen not to have any chemo or immunotherapy. She is content with her life and wants to be able to enjoy the time she has left.  [facility] did discuss possible hospice involvement for the future. Pt and dtr report that they have already been in contact with them and have a meeting set with hospice  of the [facility] to discuss different services. [facility] will remain available for what ever is needed and did provide a business card to Pt and family.

## 2025-01-27 ENCOUNTER — APPOINTMENT (OUTPATIENT)
Dept: HEMATOLOGY/ONCOLOGY | Facility: CLINIC | Age: OVER 89
End: 2025-01-27
Payer: MEDICARE

## 2025-01-28 ENCOUNTER — APPOINTMENT (OUTPATIENT)
Dept: HEMATOLOGY/ONCOLOGY | Facility: CLINIC | Age: OVER 89
End: 2025-01-28
Payer: MEDICARE

## 2025-01-28 ENCOUNTER — PATIENT MESSAGE (OUTPATIENT)
Dept: PRIMARY CARE | Facility: CLINIC | Age: OVER 89
End: 2025-01-28
Payer: MEDICARE

## 2025-01-28 DIAGNOSIS — K21.9 GASTROESOPHAGEAL REFLUX DISEASE WITHOUT ESOPHAGITIS: Primary | ICD-10-CM

## 2025-01-28 RX ORDER — OMEPRAZOLE 20 MG/1
20 CAPSULE, DELAYED RELEASE ORAL
Qty: 90 CAPSULE | Refills: 1 | Status: SHIPPED | OUTPATIENT
Start: 2025-01-28

## 2025-01-29 ENCOUNTER — TELEPHONE (OUTPATIENT)
Dept: RADIATION ONCOLOGY | Facility: CLINIC | Age: OVER 89
End: 2025-01-29
Payer: MEDICARE

## 2025-01-29 NOTE — TELEPHONE ENCOUNTER
Telephone call completed by this RN for reminder call prior to CT/Sim. Provided education on arriving 30 minutes early. Left FELICIANO.

## 2025-01-30 ENCOUNTER — HOSPITAL ENCOUNTER (OUTPATIENT)
Dept: RADIATION ONCOLOGY | Facility: CLINIC | Age: OVER 89
Setting detail: RADIATION/ONCOLOGY SERIES
Discharge: HOME | End: 2025-01-30
Payer: MEDICARE

## 2025-01-30 ENCOUNTER — HOSPITAL ENCOUNTER (OUTPATIENT)
Dept: RADIOLOGY | Facility: EXTERNAL LOCATION | Age: OVER 89
Discharge: HOME | End: 2025-01-30

## 2025-01-30 VITALS
DIASTOLIC BLOOD PRESSURE: 65 MMHG | WEIGHT: 98.44 LBS | BODY MASS INDEX: 18.6 KG/M2 | RESPIRATION RATE: 18 BRPM | TEMPERATURE: 96.8 F | HEART RATE: 75 BPM | OXYGEN SATURATION: 96 % | SYSTOLIC BLOOD PRESSURE: 134 MMHG

## 2025-01-30 DIAGNOSIS — C15.5 MALIGNANT NEOPLASM OF ABDOMINAL ESOPHAGUS (MULTI): ICD-10-CM

## 2025-01-30 PROCEDURE — 77290 THER RAD SIMULAJ FIELD CPLX: CPT | Performed by: STUDENT IN AN ORGANIZED HEALTH CARE EDUCATION/TRAINING PROGRAM

## 2025-01-30 ASSESSMENT — PAIN SCALES - GENERAL: PAINLEVEL_OUTOF10: 0-NO PAIN

## 2025-01-30 NOTE — PROGRESS NOTES
Patient seen for CT/Simulation appointment, vitals taken, allergies and screenings reviewed. Plan for today and treatment going forward reviewed, questions answered. Patient taken to changing area, RT notified.

## 2025-02-03 ENCOUNTER — TELEMEDICINE (OUTPATIENT)
Dept: HEMATOLOGY/ONCOLOGY | Facility: CLINIC | Age: OVER 89
End: 2025-02-03
Payer: MEDICARE

## 2025-02-03 DIAGNOSIS — C80.1 ADENOCARCINOMA (MULTI): Primary | ICD-10-CM

## 2025-02-03 PROCEDURE — 99213 OFFICE O/P EST LOW 20 MIN: CPT | Performed by: INTERNAL MEDICINE

## 2025-02-03 PROCEDURE — 1157F ADVNC CARE PLAN IN RCRD: CPT | Performed by: INTERNAL MEDICINE

## 2025-02-03 PROCEDURE — 1125F AMNT PAIN NOTED PAIN PRSNT: CPT | Performed by: INTERNAL MEDICINE

## 2025-02-03 ASSESSMENT — PAIN SCALES - GENERAL: PAINLEVEL_OUTOF10: 3

## 2025-02-03 NOTE — PROGRESS NOTES
Patient ID: Ramirez Dobbins is a 89 y.o. female.  This was a phone appointment.  Diagnoses:   1. GE junction adenocarcinoma, deficient MMR, with lung metastasis(rt. Perihilar)- stage IV.  2. Right sided breast cancer on tamoxifen (no surgery performed)  3. Atrial fibrillation, HTN    Genomic profile: dMMR by IHC    Assessment and Plan:  This is a 89-year-old very pleasant woman with a new diagnosis of GE junction adenocarcinoma.  On CT chest, there is a new perihilar right upper lobe mass.  It is unclear if the perihilar mass is a primary lung cancer versus a metastatic lesion from the GE junction adenocarcinoma. She has clearly expressed that she is not interested in radical treatment like surgery and chemotherapy which can likely make her quite sick.  Given her advanced age and comorbidities, I concur.  She has significant swallowing difficulties.  Her overall ECOG performance status is 2.    I discussed that the right perihilar mass could be a primary lung cancer.  To differentiate between metastatic esophageal cancer versus primary lung cancer,  a biopsy of the mass would be necessary, possibly a bronchoscopic biopsy.  She, however, is not willing to proceed with another biopsy.  In light of that, I proposed an alternative plan which is treating her with immunotherapy, and if the perihilar mass does not respond to that, we can further investigate the mass or radiate the mass.  I recommended immunotherapy as preferred option given its excellent efficacy in MSI high tumors and excellent tolerability.  She has been seen by radiation oncology (Dr. Kellogg) as well.  At this time she has chosen to pursue radiation treatment only.  She will likely start radiation in a week or so.      Plan: I have requested a follow-up appointment with me with labs on March 3, 2025.  I asked her to call us back if she experiences any difficulties between now and then.    I have placed all orders as outlined above. I advised the patient to  schedule the tests and follow-up appointment as discussed by contacting the  on the way out or calling by phone.    Providers:  Surgeon:   Branden:  Alyce:    Chief complaint: GE junction adenocarcinoma.    HPI:  ONCOLOGIC HISTORY-    June/July 2024: Developed progressive dysphagia.    12/23/2025: CT abdomen/pelvis showed-  IMPRESSION:  1.  Interval worsening multilevel compression deformities, most prominent T10 and L1 vertebral bodies, since 2023.  2. Large hiatal hernia.  3. Other stable findings as described above.      January 9, 2025: EGD confirmed a partially obstructing mass at the GE junction.  Biopsy confirmed adenocarcinoma.    January 11, 2025: CT chest showed the following-  IMPRESSION:  New perihilar right upper lobe mass measures 41 x 38 x 30 mm, suspect  for primary malignancy. No postobstructive pneumonia associated with  It. There are not multiple pulmonary nodules to suggest metastatic disease.    No thoracic adenopathy, pleural or pericardial effusion.      January 15, 2025: PET CT scan showed the following-    IMPRESSION:  1. FDG avid circumferential wall thickening involving distal  esophagus and gastroesophageal junction, consistent with known  adenocarcinoma.  2. FDG avid perigastric node, likely representing harshal metastasis.  3. FDG avid right upper lobe perihilar mass, primary versus  metastasis.  4. FDG avid right breast soft tissue lesion, consistent with known  breast cancer.    Interval history:   She feels weak and continues to have swallowing difficulties.    Past Medical History:   Past Medical History:  10/05/2023: Acetabulum fracture, left  05/24/2022: Body mass index (BMI) 21.0-21.9, adult      Comment:  BMI 21.0-21.9, adult  No date: Breast cancer (Multi)      Comment:  right  04/05/2016: Cough, unspecified      Comment:  Cough  No date: Encounter for screening mammogram for malignant neoplasm of   breast      Comment:  Visit for screening mammogram  07/01/2020:  Iliotibial band syndrome, left leg      Comment:  Iliotibial band syndrome of left side  07/08/2021: Other forms of dyspnea      Comment:  Dyspnea on exertion  01/02/2015: Pain in unspecified hip      Comment:  Joint pain, hip  04/29/2015: Personal history of other diseases of the respiratory   system      Comment:  History of acute sinusitis  04/20/2016: Personal history of other diseases of the respiratory   system      Comment:  History of acute bronchitis  06/23/2015: Personal history of other specified conditions      Comment:  History of vertigo  09/01/2016: Personal history of urinary (tract) infections      Comment:  History of urinary tract infection  11/15/2013: Personal history of urinary (tract) infections      Comment:  History of urinary tract infection  06/03/2016: Sprain of unspecified ligament of left ankle, initial   encounter      Comment:  Left ankle sprain  06/27/2013: Sprain of unspecified part of unspecified wrist and hand,   initial encounter      Comment:  Hand sprain   Surgical History:    Past Surgical History:   Procedure Laterality Date    CHOLECYSTECTOMY  05/05/2016    Cholecystectomy    JOINT REPLACEMENT Left 2014    total hip surgery    OTHER SURGICAL HISTORY Left 04/01/2024    cut polyps out of ear      Family History:    Family History   Problem Relation Name Age of Onset    Diabetes Father      Breast cancer Sister       Family Oncology History:    Cancer-related family history includes Breast cancer in her sister.  Social History:    Social History     Tobacco Use    Smoking status: Former     Types: Cigarettes     Passive exposure: Past    Smokeless tobacco: Never   Vaping Use    Vaping status: Never Used   Substance Use Topics    Alcohol use: Not Currently    Drug use: Never        Allergies  Allergies   Allergen Reactions    Codeine Hallucinations    Nitrofurantoin Dizziness and Hallucinations    Penicillins Hives    Albuterol Unknown    Sulfamethoxazole-Trimethoprim Unknown         Medications  Current Outpatient Medications   Medication Instructions    amLODIPine (NORVASC) 5 mg, oral, Daily    cholecalciferol (Vitamin D-3) 50 MCG (2000 UT) tablet 1 tablet, Daily    Eliquis 2.5 mg, oral, Every 12 hours    famotidine (PEPCID) 20 mg, oral, 2 times daily    fluticasone (Flonase) 50 mcg/actuation nasal spray 1 spray, Each Nostril, Daily, Shake gently. Before first use, prime pump. After use, clean tip and replace cap.    fluticasone-umeclidin-vilanter (Trelegy Ellipta) 200-62.5-25 mcg blister with device 1 puff, inhalation, Daily    levalbuterol (Xopenex) 45 mcg/actuation inhaler 1-2 puffs, inhalation, Every 4 hours PRN    melatonin 1 mg tablet,chewable Chew.    methocarbamol (ROBAXIN) 250 mg, oral, 3 times daily PRN    metoprolol succinate XL (TOPROL-XL) 200 mg, oral, Daily    omeprazole (PRILOSEC) 20 mg, oral, Daily (0630)    sennosides (Senokot) 8.6 mg tablet 1 tablet, Daily    sennosides-docusate sodium (Rosa Maria-Colace) 8.6-50 mg tablet 1 tablet, Daily    sucralfate (CARAFATE) 1 g, oral, 4 times daily with meals and nightly    tamoxifen (NOLVADEX) 20 mg, oral, Daily          Objective   VS: There were no vitals taken for this visit.  Weight:   There were no vitals filed for this visit.       PHYSICAL EXAMINATION      Labs                         Image       This note was created using a voice recognition system ( the Dragon dictation system). Inaccuracies and misspellings are unintentional.   Total time spent 20 minutes.  Chris Renner MD.

## 2025-02-04 ENCOUNTER — HOSPITAL ENCOUNTER (OUTPATIENT)
Dept: RADIATION ONCOLOGY | Facility: CLINIC | Age: OVER 89
Setting detail: RADIATION/ONCOLOGY SERIES
Discharge: HOME | End: 2025-02-04
Payer: MEDICARE

## 2025-02-04 PROCEDURE — 77301 RADIOTHERAPY DOSE PLAN IMRT: CPT | Performed by: STUDENT IN AN ORGANIZED HEALTH CARE EDUCATION/TRAINING PROGRAM

## 2025-02-04 PROCEDURE — 77338 DESIGN MLC DEVICE FOR IMRT: CPT | Performed by: STUDENT IN AN ORGANIZED HEALTH CARE EDUCATION/TRAINING PROGRAM

## 2025-02-04 PROCEDURE — 77300 RADIATION THERAPY DOSE PLAN: CPT | Performed by: STUDENT IN AN ORGANIZED HEALTH CARE EDUCATION/TRAINING PROGRAM

## 2025-02-06 ENCOUNTER — NUTRITION (OUTPATIENT)
Dept: HEMATOLOGY/ONCOLOGY | Facility: CLINIC | Age: OVER 89
End: 2025-02-06
Payer: MEDICARE

## 2025-02-06 NOTE — PROGRESS NOTES
NUTRITION Assessment NOTE    Nutrition Assessment     Reason for Visit:  Ramirez Dobbins is a 89 y.o. female who presents for GE junction adenocarcinoma. R upper lobe lung mass - primary lung vs mets from GE junction.  Pt not interested in radical treatment/surgery/chemotherapy, not willing to proceed with another biopsy.     Pt declined immunotherapy. Planned RT, no appointments scheduled at this time. Per SW note family contacted hospice for information.  Will remain available     Pt declined  Lab Results   Component Value Date/Time    GLUCOSE 113 (H) 10/15/2024 1142     (L) 10/15/2024 1142    K 3.9 10/15/2024 1142     10/15/2024 1142    CO2 25 10/15/2024 1142    ANIONGAP 12 10/15/2024 1142    BUN 15 12/19/2024 1205    CREATININE 0.73 12/19/2024 1205    EGFR 78 12/19/2024 1205    CALCIUM 9.4 10/15/2024 1142    ALBUMIN 3.9 10/15/2024 1142    ALKPHOS 29 (L) 10/15/2024 1142    PROT 6.6 10/15/2024 1142    AST 11 10/15/2024 1142    BILITOT 0.5 10/15/2024 1142    ALT 7 10/15/2024 1142    MG 2.02 06/12/2022 0636    PHOS 2.8 06/12/2022 0636     Lab Results   Component Value Date/Time    VITD25 60 12/27/2023 1447       Anthropometrics:           Wt Readings from Last 10 Encounters:   01/30/25 (!) 44.7 kg (98 lb 7 oz)   01/23/25 45.2 kg (99 lb 10.4 oz)   01/13/25 (!) 44.1 kg (97 lb 3.6 oz)   11/27/24 46.2 kg (101 lb 12.8 oz)   10/15/24 47.1 kg (103 lb 15.2 oz)   10/08/24 46.3 kg (102 lb)   08/12/24 47.9 kg (105 lb 9.6 oz)   07/02/24 47.9 kg (105 lb 9.6 oz)   06/27/24 46.3 kg (102 lb)   05/23/24 48.4 kg (106 lb 9.6 oz)        Food And Nutrient Intake:                                                                 Nutrition Focused Physical Exam Findings: deferred phone visit                          Energy Needs           Nutrition Diagnosis             Nutrition Interventions/Recommendations   Nutrition Prescription       Food and Nutrition Delivery       Nutrition Education       Coordination of Care          Nutrition Monitoring and Evaluation                   Contact information provided  Following as needed

## 2025-02-10 ENCOUNTER — HOSPITAL ENCOUNTER (OUTPATIENT)
Dept: RADIATION ONCOLOGY | Facility: CLINIC | Age: OVER 89
Setting detail: RADIATION/ONCOLOGY SERIES
Discharge: HOME | End: 2025-02-10
Payer: MEDICARE

## 2025-02-10 DIAGNOSIS — C16.0 MALIGNANT NEOPLASM OF CARDIA (MULTI): ICD-10-CM

## 2025-02-10 DIAGNOSIS — Z51.0 ENCOUNTER FOR ANTINEOPLASTIC RADIATION THERAPY: ICD-10-CM

## 2025-02-10 LAB
RAD ONC MSQ ACTUAL FRACTIONS DELIVERED: 1
RAD ONC MSQ ACTUAL SESSION DELIVERED DOSE: 300 CGRAY
RAD ONC MSQ ACTUAL TOTAL DOSE: 300 CGRAY
RAD ONC MSQ ELAPSED DAYS: 0
RAD ONC MSQ LAST DATE: NORMAL
RAD ONC MSQ PRESCRIBED FRACTIONAL DOSE: 300 CGRAY
RAD ONC MSQ PRESCRIBED NUMBER OF FRACTIONS: 10
RAD ONC MSQ PRESCRIBED TECHNIQUE: NORMAL
RAD ONC MSQ PRESCRIBED TOTAL DOSE: 3000 CGRAY
RAD ONC MSQ PRESCRIPTION PATTERN COMMENT: NORMAL
RAD ONC MSQ START DATE: NORMAL
RAD ONC MSQ TREATMENT COURSE NUMBER: 1
RAD ONC MSQ TREATMENT SITE: NORMAL

## 2025-02-10 PROCEDURE — 77386 HC INTENSITY-MODULATED RADIATION THERAPY (IMRT), COMPLEX: CPT | Performed by: STUDENT IN AN ORGANIZED HEALTH CARE EDUCATION/TRAINING PROGRAM

## 2025-02-11 ENCOUNTER — HOSPITAL ENCOUNTER (OUTPATIENT)
Dept: RADIATION ONCOLOGY | Facility: CLINIC | Age: OVER 89
Setting detail: RADIATION/ONCOLOGY SERIES
Discharge: HOME | End: 2025-02-11
Payer: MEDICARE

## 2025-02-11 DIAGNOSIS — C16.0 MALIGNANT NEOPLASM OF CARDIA (MULTI): ICD-10-CM

## 2025-02-11 DIAGNOSIS — Z51.0 ENCOUNTER FOR ANTINEOPLASTIC RADIATION THERAPY: ICD-10-CM

## 2025-02-11 LAB
RAD ONC MSQ ACTUAL FRACTIONS DELIVERED: 2
RAD ONC MSQ ACTUAL SESSION DELIVERED DOSE: 300 CGRAY
RAD ONC MSQ ACTUAL TOTAL DOSE: 600 CGRAY
RAD ONC MSQ ELAPSED DAYS: 1
RAD ONC MSQ LAST DATE: NORMAL
RAD ONC MSQ PRESCRIBED FRACTIONAL DOSE: 300 CGRAY
RAD ONC MSQ PRESCRIBED NUMBER OF FRACTIONS: 10
RAD ONC MSQ PRESCRIBED TECHNIQUE: NORMAL
RAD ONC MSQ PRESCRIBED TOTAL DOSE: 3000 CGRAY
RAD ONC MSQ PRESCRIPTION PATTERN COMMENT: NORMAL
RAD ONC MSQ START DATE: NORMAL
RAD ONC MSQ TREATMENT COURSE NUMBER: 1
RAD ONC MSQ TREATMENT SITE: NORMAL

## 2025-02-11 PROCEDURE — 77336 RADIATION PHYSICS CONSULT: CPT | Performed by: STUDENT IN AN ORGANIZED HEALTH CARE EDUCATION/TRAINING PROGRAM

## 2025-02-11 PROCEDURE — 77386 HC INTENSITY-MODULATED RADIATION THERAPY (IMRT), COMPLEX: CPT | Performed by: STUDENT IN AN ORGANIZED HEALTH CARE EDUCATION/TRAINING PROGRAM

## 2025-02-12 ENCOUNTER — HOSPITAL ENCOUNTER (OUTPATIENT)
Dept: RADIATION ONCOLOGY | Facility: CLINIC | Age: OVER 89
Setting detail: RADIATION/ONCOLOGY SERIES
Discharge: HOME | End: 2025-02-12
Payer: MEDICARE

## 2025-02-12 VITALS
WEIGHT: 96.34 LBS | TEMPERATURE: 96.1 F | RESPIRATION RATE: 18 BRPM | DIASTOLIC BLOOD PRESSURE: 57 MMHG | BODY MASS INDEX: 18.2 KG/M2 | OXYGEN SATURATION: 96 % | HEART RATE: 65 BPM | SYSTOLIC BLOOD PRESSURE: 123 MMHG

## 2025-02-12 VITALS
RESPIRATION RATE: 18 BRPM | OXYGEN SATURATION: 96 % | TEMPERATURE: 96.1 F | DIASTOLIC BLOOD PRESSURE: 57 MMHG | HEART RATE: 65 BPM | SYSTOLIC BLOOD PRESSURE: 123 MMHG

## 2025-02-12 DIAGNOSIS — Z51.0 ENCOUNTER FOR ANTINEOPLASTIC RADIATION THERAPY: ICD-10-CM

## 2025-02-12 DIAGNOSIS — C16.0 MALIGNANT NEOPLASM OF CARDIA (MULTI): ICD-10-CM

## 2025-02-12 LAB
RAD ONC MSQ ACTUAL FRACTIONS DELIVERED: 3
RAD ONC MSQ ACTUAL SESSION DELIVERED DOSE: 300 CGRAY
RAD ONC MSQ ACTUAL TOTAL DOSE: 900 CGRAY
RAD ONC MSQ ELAPSED DAYS: 2
RAD ONC MSQ LAST DATE: NORMAL
RAD ONC MSQ PRESCRIBED FRACTIONAL DOSE: 300 CGRAY
RAD ONC MSQ PRESCRIBED NUMBER OF FRACTIONS: 10
RAD ONC MSQ PRESCRIBED TECHNIQUE: NORMAL
RAD ONC MSQ PRESCRIBED TOTAL DOSE: 3000 CGRAY
RAD ONC MSQ PRESCRIPTION PATTERN COMMENT: NORMAL
RAD ONC MSQ START DATE: NORMAL
RAD ONC MSQ TREATMENT COURSE NUMBER: 1
RAD ONC MSQ TREATMENT SITE: NORMAL

## 2025-02-12 PROCEDURE — 77386 HC INTENSITY-MODULATED RADIATION THERAPY (IMRT), COMPLEX: CPT | Performed by: STUDENT IN AN ORGANIZED HEALTH CARE EDUCATION/TRAINING PROGRAM

## 2025-02-12 ASSESSMENT — COLUMBIA-SUICIDE SEVERITY RATING SCALE - C-SSRS
2. HAVE YOU ACTUALLY HAD ANY THOUGHTS OF KILLING YOURSELF?: NO
1. IN THE PAST MONTH, HAVE YOU WISHED YOU WERE DEAD OR WISHED YOU COULD GO TO SLEEP AND NOT WAKE UP?: NO
6. HAVE YOU EVER DONE ANYTHING, STARTED TO DO ANYTHING, OR PREPARED TO DO ANYTHING TO END YOUR LIFE?: NO

## 2025-02-12 ASSESSMENT — PAIN SCALES - GENERAL
PAINLEVEL_OUTOF10: 0-NO PAIN
PAINLEVEL_OUTOF10: 0-NO PAIN

## 2025-02-12 ASSESSMENT — ENCOUNTER SYMPTOMS
DEPRESSION: 0
OCCASIONAL FEELINGS OF UNSTEADINESS: 0
LOSS OF SENSATION IN FEET: 0

## 2025-02-12 NOTE — PROGRESS NOTES
Radiation Oncology On Treatment Visit    Patient Name:  Ramirez Dobbins  MRN:  [id number]  :  6/10/1933    Referring Provider: Chris Renner MD  Primary Care Provider: Jose Schafer DO  Care Team: Patient Care Team:  Jose Schafer DO as PCP - General (Family Medicine)  Luis Umaña MD as Consulting Physician (Hematology and Oncology)  Blayne Page MD as Consulting Physician (Cardiology)  Chris Renner MD as Consulting Physician (Hematology and Oncology)    Date of Service: 2025     Diagnosis:   Specialty Problems          Radiation Oncology Problems    Malignant neoplasm of female breast        Adenocarcinoma (Multi)        Malignant neoplasm of lower third of esophagus (Multi)         Treatment Summary:  3D CRT: Not Applicable Lower esophagus, Right Hilum of lung    Treatment Period Technique Fraction Dose Fractions Total Dose   Course 1 2/10/2025-2025  (days elapsed: 2)         Esph_RLung 2/10/2025-2025 VMAT 300 / 300 cGy 3 / 10 900 / 3,000 cGy     SUBJECTIVE: Continue difficulty gaining weight, difficulty swallowing.  No nausea or vomiting.    OBJECTIVE:   Vital Signs:  /57 (Patient Position: Sitting)   Pulse 65   Temp 35.6 °C (96.1 °F)   Resp 18   SpO2 96%     Other Pertinent Findings:     Toxicity Assessment          2025    09:24   Toxicity Assessment   Treatment Site Thoracic   Anorexia Grade 1   Anxiety Grade 1   Dehydration Grade 0   Depression Grade 0   Dermatitis Radiation Grade 0   Diarrhea Grade 0   Fatigue Grade 1   Fibrosis Deep Connective Tissue Grade 0   Fracture Grade 0   Nausea Grade 0   Pain Grade 0   Treatment Related Secondary Malignancy Grade 0   Tumor Pain Grade 0   Vomiting Grade 0   Constipation Grade 0   Dyspepsia Grade 0   Dysphagia Grade 0   Esophagitis Grade 0   Mucositis Oral Grade 0   Upper Gastrointestinal Hemorrhage Grade 0   Peripheral Sensory Neuropathy Grade 0   Brachial Plexopathy Grade 0   Pneumonitis Grade 0   Aspiration  Grade 0   Hoarseness Grade 0   Laryngeal Edema Grade 0   Myocardial Infarction Grade 0       small amount of pain in chest area   Pericardial Effusion Grade 0   Pericarditis Grade 0   Esophageal Fistula Grade 0   Esophageal Obstruction Grade 1       feels as though food gets stopped up   Esophageal Pain Grade 0   Esophageal Stenosis Grade 0   Esophageal Ulcer Grade 0   Bronchial Obstruction Grade 0   Cough Grade 1   Dyspnea Grade 1   Epistaxis Grade 0   Hiccups Grade 0   Hypoxia Grade 0   Pulmonary Fibrosis Grade 0   Lymphedema Grade 0   Thromboembolic Event Grade 0   Hot Flashes Grade 0        Concurrent systemic therapy: None      Labs:  None    Exam: Resting in chair.     Assessment / Plan:  The patient is tolerating radiation therapy as anticipated.  Continue per current treatment plan.       Therapies: Dietary following for nutrition deficit. Will monitor swallowing symptoms.      Side effects reviewed with patient. Images, chart and labs reviewed.    Justyn Kellogg MD

## 2025-02-13 ENCOUNTER — HOSPITAL ENCOUNTER (OUTPATIENT)
Dept: RADIATION ONCOLOGY | Facility: CLINIC | Age: OVER 89
Setting detail: RADIATION/ONCOLOGY SERIES
Discharge: HOME | End: 2025-02-13
Payer: MEDICARE

## 2025-02-13 DIAGNOSIS — Z51.0 ENCOUNTER FOR ANTINEOPLASTIC RADIATION THERAPY: ICD-10-CM

## 2025-02-13 DIAGNOSIS — C16.0 MALIGNANT NEOPLASM OF CARDIA (MULTI): ICD-10-CM

## 2025-02-13 LAB
RAD ONC MSQ ACTUAL FRACTIONS DELIVERED: 4
RAD ONC MSQ ACTUAL SESSION DELIVERED DOSE: 300 CGRAY
RAD ONC MSQ ACTUAL TOTAL DOSE: 1200 CGRAY
RAD ONC MSQ ELAPSED DAYS: 3
RAD ONC MSQ LAST DATE: NORMAL
RAD ONC MSQ PRESCRIBED FRACTIONAL DOSE: 300 CGRAY
RAD ONC MSQ PRESCRIBED NUMBER OF FRACTIONS: 10
RAD ONC MSQ PRESCRIBED TECHNIQUE: NORMAL
RAD ONC MSQ PRESCRIBED TOTAL DOSE: 3000 CGRAY
RAD ONC MSQ PRESCRIPTION PATTERN COMMENT: NORMAL
RAD ONC MSQ START DATE: NORMAL
RAD ONC MSQ TREATMENT COURSE NUMBER: 1
RAD ONC MSQ TREATMENT SITE: NORMAL

## 2025-02-13 PROCEDURE — 77386 HC INTENSITY-MODULATED RADIATION THERAPY (IMRT), COMPLEX: CPT | Performed by: STUDENT IN AN ORGANIZED HEALTH CARE EDUCATION/TRAINING PROGRAM

## 2025-02-14 ENCOUNTER — HOSPITAL ENCOUNTER (OUTPATIENT)
Dept: RADIATION ONCOLOGY | Facility: CLINIC | Age: OVER 89
Setting detail: RADIATION/ONCOLOGY SERIES
Discharge: HOME | End: 2025-02-14
Payer: MEDICARE

## 2025-02-14 DIAGNOSIS — Z51.0 ENCOUNTER FOR ANTINEOPLASTIC RADIATION THERAPY: ICD-10-CM

## 2025-02-14 DIAGNOSIS — C16.0 MALIGNANT NEOPLASM OF CARDIA (MULTI): ICD-10-CM

## 2025-02-14 LAB
RAD ONC MSQ ACTUAL FRACTIONS DELIVERED: 5
RAD ONC MSQ ACTUAL SESSION DELIVERED DOSE: 300 CGRAY
RAD ONC MSQ ACTUAL TOTAL DOSE: 1500 CGRAY
RAD ONC MSQ ELAPSED DAYS: 4
RAD ONC MSQ LAST DATE: NORMAL
RAD ONC MSQ PRESCRIBED FRACTIONAL DOSE: 300 CGRAY
RAD ONC MSQ PRESCRIBED NUMBER OF FRACTIONS: 10
RAD ONC MSQ PRESCRIBED TECHNIQUE: NORMAL
RAD ONC MSQ PRESCRIBED TOTAL DOSE: 3000 CGRAY
RAD ONC MSQ PRESCRIPTION PATTERN COMMENT: NORMAL
RAD ONC MSQ START DATE: NORMAL
RAD ONC MSQ TREATMENT COURSE NUMBER: 1
RAD ONC MSQ TREATMENT SITE: NORMAL

## 2025-02-14 PROCEDURE — 77386 HC INTENSITY-MODULATED RADIATION THERAPY (IMRT), COMPLEX: CPT | Performed by: STUDENT IN AN ORGANIZED HEALTH CARE EDUCATION/TRAINING PROGRAM

## 2025-02-17 ENCOUNTER — APPOINTMENT (OUTPATIENT)
Dept: PULMONOLOGY | Facility: CLINIC | Age: OVER 89
End: 2025-02-17
Payer: MEDICARE

## 2025-02-17 ENCOUNTER — HOSPITAL ENCOUNTER (OUTPATIENT)
Dept: RADIATION ONCOLOGY | Facility: CLINIC | Age: OVER 89
Setting detail: RADIATION/ONCOLOGY SERIES
Discharge: HOME | End: 2025-02-17
Payer: MEDICARE

## 2025-02-17 DIAGNOSIS — C16.0 MALIGNANT NEOPLASM OF CARDIA (MULTI): ICD-10-CM

## 2025-02-17 DIAGNOSIS — Z51.0 ENCOUNTER FOR ANTINEOPLASTIC RADIATION THERAPY: ICD-10-CM

## 2025-02-17 LAB
RAD ONC MSQ ACTUAL FRACTIONS DELIVERED: 6
RAD ONC MSQ ACTUAL SESSION DELIVERED DOSE: 300 CGRAY
RAD ONC MSQ ACTUAL TOTAL DOSE: 1800 CGRAY
RAD ONC MSQ ELAPSED DAYS: 7
RAD ONC MSQ LAST DATE: NORMAL
RAD ONC MSQ PRESCRIBED FRACTIONAL DOSE: 300 CGRAY
RAD ONC MSQ PRESCRIBED NUMBER OF FRACTIONS: 10
RAD ONC MSQ PRESCRIBED TECHNIQUE: NORMAL
RAD ONC MSQ PRESCRIBED TOTAL DOSE: 3000 CGRAY
RAD ONC MSQ PRESCRIPTION PATTERN COMMENT: NORMAL
RAD ONC MSQ START DATE: NORMAL
RAD ONC MSQ TREATMENT COURSE NUMBER: 1
RAD ONC MSQ TREATMENT SITE: NORMAL

## 2025-02-17 PROCEDURE — 77386 HC INTENSITY-MODULATED RADIATION THERAPY (IMRT), COMPLEX: CPT | Performed by: STUDENT IN AN ORGANIZED HEALTH CARE EDUCATION/TRAINING PROGRAM

## 2025-02-18 ENCOUNTER — HOSPITAL ENCOUNTER (OUTPATIENT)
Dept: RADIATION ONCOLOGY | Facility: CLINIC | Age: OVER 89
Setting detail: RADIATION/ONCOLOGY SERIES
Discharge: HOME | End: 2025-02-18
Payer: MEDICARE

## 2025-02-18 DIAGNOSIS — Z51.0 ENCOUNTER FOR ANTINEOPLASTIC RADIATION THERAPY: ICD-10-CM

## 2025-02-18 DIAGNOSIS — C16.0 MALIGNANT NEOPLASM OF CARDIA (MULTI): ICD-10-CM

## 2025-02-18 LAB
RAD ONC MSQ ACTUAL FRACTIONS DELIVERED: 7
RAD ONC MSQ ACTUAL SESSION DELIVERED DOSE: 300 CGRAY
RAD ONC MSQ ACTUAL TOTAL DOSE: 2100 CGRAY
RAD ONC MSQ ELAPSED DAYS: 8
RAD ONC MSQ LAST DATE: NORMAL
RAD ONC MSQ PRESCRIBED FRACTIONAL DOSE: 300 CGRAY
RAD ONC MSQ PRESCRIBED NUMBER OF FRACTIONS: 10
RAD ONC MSQ PRESCRIBED TECHNIQUE: NORMAL
RAD ONC MSQ PRESCRIBED TOTAL DOSE: 3000 CGRAY
RAD ONC MSQ PRESCRIPTION PATTERN COMMENT: NORMAL
RAD ONC MSQ START DATE: NORMAL
RAD ONC MSQ TREATMENT COURSE NUMBER: 1
RAD ONC MSQ TREATMENT SITE: NORMAL

## 2025-02-18 PROCEDURE — 77336 RADIATION PHYSICS CONSULT: CPT | Performed by: STUDENT IN AN ORGANIZED HEALTH CARE EDUCATION/TRAINING PROGRAM

## 2025-02-18 PROCEDURE — 77386 HC INTENSITY-MODULATED RADIATION THERAPY (IMRT), COMPLEX: CPT | Performed by: STUDENT IN AN ORGANIZED HEALTH CARE EDUCATION/TRAINING PROGRAM

## 2025-02-19 ENCOUNTER — HOSPITAL ENCOUNTER (OUTPATIENT)
Dept: RADIATION ONCOLOGY | Facility: CLINIC | Age: OVER 89
Setting detail: RADIATION/ONCOLOGY SERIES
Discharge: HOME | End: 2025-02-19
Payer: MEDICARE

## 2025-02-19 VITALS
OXYGEN SATURATION: 96 % | BODY MASS INDEX: 18.16 KG/M2 | HEART RATE: 71 BPM | WEIGHT: 96.12 LBS | DIASTOLIC BLOOD PRESSURE: 58 MMHG | TEMPERATURE: 96.1 F | RESPIRATION RATE: 18 BRPM | SYSTOLIC BLOOD PRESSURE: 111 MMHG

## 2025-02-19 DIAGNOSIS — Z51.0 ENCOUNTER FOR ANTINEOPLASTIC RADIATION THERAPY: ICD-10-CM

## 2025-02-19 DIAGNOSIS — C15.5 MALIGNANT NEOPLASM OF LOWER THIRD OF ESOPHAGUS (MULTI): ICD-10-CM

## 2025-02-19 DIAGNOSIS — C16.0 MALIGNANT NEOPLASM OF CARDIA (MULTI): ICD-10-CM

## 2025-02-19 LAB
RAD ONC MSQ ACTUAL FRACTIONS DELIVERED: 8
RAD ONC MSQ ACTUAL SESSION DELIVERED DOSE: 300 CGRAY
RAD ONC MSQ ACTUAL TOTAL DOSE: 2400 CGRAY
RAD ONC MSQ ELAPSED DAYS: 9
RAD ONC MSQ LAST DATE: NORMAL
RAD ONC MSQ PRESCRIBED FRACTIONAL DOSE: 300 CGRAY
RAD ONC MSQ PRESCRIBED NUMBER OF FRACTIONS: 10
RAD ONC MSQ PRESCRIBED TECHNIQUE: NORMAL
RAD ONC MSQ PRESCRIBED TOTAL DOSE: 3000 CGRAY
RAD ONC MSQ PRESCRIPTION PATTERN COMMENT: NORMAL
RAD ONC MSQ START DATE: NORMAL
RAD ONC MSQ TREATMENT COURSE NUMBER: 1
RAD ONC MSQ TREATMENT SITE: NORMAL

## 2025-02-19 PROCEDURE — 77386 HC INTENSITY-MODULATED RADIATION THERAPY (IMRT), COMPLEX: CPT | Performed by: STUDENT IN AN ORGANIZED HEALTH CARE EDUCATION/TRAINING PROGRAM

## 2025-02-19 RX ORDER — TRAZODONE HYDROCHLORIDE 50 MG/1
50 TABLET ORAL NIGHTLY
COMMUNITY

## 2025-02-19 ASSESSMENT — ENCOUNTER SYMPTOMS
LOSS OF SENSATION IN FEET: 0
OCCASIONAL FEELINGS OF UNSTEADINESS: 0

## 2025-02-19 ASSESSMENT — PAIN SCALES - GENERAL: PAINLEVEL_OUTOF10: 3

## 2025-02-19 NOTE — PROGRESS NOTES
Radiation Oncology On Treatment Visit    Patient Name:  Ramirez Dobbins  MRN:  [id number]  :  6/10/1933    Referring Provider: Chris Renner MD  Primary Care Provider: Jose Schafer DO  Care Team: Patient Care Team:  Jose Schafer DO as PCP - General (Family Medicine)  Luis Umaña MD as Consulting Physician (Hematology and Oncology)  Blayne Page MD as Consulting Physician (Cardiology)  Chris Renner MD as Consulting Physician (Hematology and Oncology)    Date of Service: 2025     Diagnosis:   Specialty Problems          Radiation Oncology Problems    Malignant neoplasm of female breast        Adenocarcinoma (Multi)        Malignant neoplasm of lower third of esophagus (Multi)         Treatment Summary:  3D CRT: Not Applicable Lower esophagus, Right Hilum of lung    Treatment Period Technique Fraction Dose Fractions Total Dose   Course 1 2/10/2025-2025  (days elapsed: 9)         Esph_RLung 2/10/2025-2025 VMAT 300 / 300 cGy 8 / 10 2400 / 3,000 cGy     SUBJECTIVE: Low energy improved with rest.  Episodes of constipation and diarrhea using senna.  Tolerating soft foods and liquids.      OBJECTIVE:   Vital Signs:  Visit Vitals  OB Status Postmenopausal   Smoking Status Former         Other Pertinent Findings:     Toxicity Assessment          2025    09:24 2025    09:35   Toxicity Assessment   Treatment Site Thoracic Thoracic   Anorexia Grade 1 Grade 0   Anxiety Grade 1 Grade 1   Dehydration Grade 0 Grade 0   Depression Grade 0 Grade 1   Dermatitis Radiation Grade 0 Grade 0   Diarrhea Grade 0 Grade 1       occasionally. takes senna   Fatigue Grade 1 Grade 1   Fibrosis Deep Connective Tissue Grade 0 Grade 0   Fracture Grade 0 Grade 0   Nausea Grade 0 Grade 1   Pain Grade 0 Grade 1       3/10 abd   Treatment Related Secondary Malignancy Grade 0 Grade 0   Tumor Pain Grade 0 Grade 0   Vomiting Grade 0 Grade 0   Constipation Grade 0 Grade 0   Dyspepsia Grade 0 Grade 0    Dysphagia Grade 0 Grade 1       soft foods   Esophagitis Grade 0 Grade 1   Mucositis Oral Grade 0 Grade 0   Upper Gastrointestinal Hemorrhage Grade 0 Grade 0   Peripheral Sensory Neuropathy Grade 0    Brachial Plexopathy Grade 0    Pneumonitis Grade 0 Grade 0   Aspiration Grade 0 Grade 0   Hoarseness Grade 0 Grade 0   Laryngeal Edema Grade 0 Grade 0   Myocardial Infarction Grade 0       small amount of pain in chest area Grade 0   Pericardial Effusion Grade 0 Grade 0   Pericarditis Grade 0 Grade 0   Esophageal Fistula Grade 0 Grade 0   Esophageal Obstruction Grade 1       feels as though food gets stopped up Grade 0   Esophageal Pain Grade 0 Grade 0   Esophageal Stenosis Grade 0 Grade 0   Esophageal Ulcer Grade 0 Grade 0   Bronchial Obstruction Grade 0 Grade 0   Cough Grade 1 Grade 1   Dyspnea Grade 1 Grade 1   Epistaxis Grade 0 Grade 0   Hiccups Grade 0 Grade 0   Hypoxia Grade 0 Grade 0   Pulmonary Fibrosis Grade 0 Grade 0   Lymphedema Grade 0 Grade 0   Thromboembolic Event Grade 0 Grade 0   Hot Flashes Grade 0           Concurrent systemic therapy: methylPREDNISolone sod succinate (SOLU-Medrol) 40 mg/mL injection 40 mg, 40 mg, intravenous, As needed, 0 of 17 cycles        pembrolizumab (Keytruda) 200 mg in sodium chloride 0.9% 108 mL IV, 200 mg, intravenous, Once, 0 of 17 cycles      Labs: None    Exam: Resting comfortably in wheelchair.     Assessment / Plan:  The patient is tolerating radiation therapy as anticipated.  Continue per current treatment plan.       Therapies: Discussed Zofran as needed for nausea.  Encourage continued oral intake.      Side effects reviewed with patient. Images, chart and labs reviewed.    Follow-up in 4 weeks for acute radiation toxicity assessment    Justyn Kellogg MD

## 2025-02-20 ENCOUNTER — HOSPITAL ENCOUNTER (OUTPATIENT)
Dept: RADIATION ONCOLOGY | Facility: CLINIC | Age: OVER 89
Setting detail: RADIATION/ONCOLOGY SERIES
Discharge: HOME | End: 2025-02-20
Payer: MEDICARE

## 2025-02-20 DIAGNOSIS — Z51.0 ENCOUNTER FOR ANTINEOPLASTIC RADIATION THERAPY: ICD-10-CM

## 2025-02-20 DIAGNOSIS — C16.0 MALIGNANT NEOPLASM OF CARDIA (MULTI): ICD-10-CM

## 2025-02-20 LAB
RAD ONC MSQ ACTUAL FRACTIONS DELIVERED: 9
RAD ONC MSQ ACTUAL SESSION DELIVERED DOSE: 300 CGRAY
RAD ONC MSQ ACTUAL TOTAL DOSE: 2700 CGRAY
RAD ONC MSQ ELAPSED DAYS: 10
RAD ONC MSQ LAST DATE: NORMAL
RAD ONC MSQ PRESCRIBED FRACTIONAL DOSE: 300 CGRAY
RAD ONC MSQ PRESCRIBED NUMBER OF FRACTIONS: 10
RAD ONC MSQ PRESCRIBED TECHNIQUE: NORMAL
RAD ONC MSQ PRESCRIBED TOTAL DOSE: 3000 CGRAY
RAD ONC MSQ PRESCRIPTION PATTERN COMMENT: NORMAL
RAD ONC MSQ START DATE: NORMAL
RAD ONC MSQ TREATMENT COURSE NUMBER: 1
RAD ONC MSQ TREATMENT SITE: NORMAL

## 2025-02-20 PROCEDURE — 77386 HC INTENSITY-MODULATED RADIATION THERAPY (IMRT), COMPLEX: CPT | Performed by: STUDENT IN AN ORGANIZED HEALTH CARE EDUCATION/TRAINING PROGRAM

## 2025-02-21 ENCOUNTER — PATIENT MESSAGE (OUTPATIENT)
Dept: PRIMARY CARE | Facility: CLINIC | Age: OVER 89
End: 2025-02-21
Payer: MEDICARE

## 2025-02-21 ENCOUNTER — HOSPITAL ENCOUNTER (OUTPATIENT)
Dept: RADIATION ONCOLOGY | Facility: CLINIC | Age: OVER 89
Setting detail: RADIATION/ONCOLOGY SERIES
Discharge: HOME | End: 2025-02-21
Payer: MEDICARE

## 2025-02-21 ENCOUNTER — DOCUMENTATION (OUTPATIENT)
Dept: RADIATION ONCOLOGY | Facility: CLINIC | Age: OVER 89
End: 2025-02-21

## 2025-02-21 DIAGNOSIS — Z51.0 ENCOUNTER FOR ANTINEOPLASTIC RADIATION THERAPY: ICD-10-CM

## 2025-02-21 DIAGNOSIS — C16.0 MALIGNANT NEOPLASM OF CARDIA (MULTI): ICD-10-CM

## 2025-02-21 DIAGNOSIS — I10 PRIMARY HYPERTENSION: Primary | ICD-10-CM

## 2025-02-21 LAB
RAD ONC MSQ ACTUAL FRACTIONS DELIVERED: 10
RAD ONC MSQ ACTUAL SESSION DELIVERED DOSE: 300 CGRAY
RAD ONC MSQ ACTUAL TOTAL DOSE: 3000 CGRAY
RAD ONC MSQ ELAPSED DAYS: 11
RAD ONC MSQ LAST DATE: NORMAL
RAD ONC MSQ PRESCRIBED FRACTIONAL DOSE: 300 CGRAY
RAD ONC MSQ PRESCRIBED NUMBER OF FRACTIONS: 10
RAD ONC MSQ PRESCRIBED TECHNIQUE: NORMAL
RAD ONC MSQ PRESCRIBED TOTAL DOSE: 3000 CGRAY
RAD ONC MSQ PRESCRIPTION PATTERN COMMENT: NORMAL
RAD ONC MSQ START DATE: NORMAL
RAD ONC MSQ TREATMENT COURSE NUMBER: 1
RAD ONC MSQ TREATMENT SITE: NORMAL

## 2025-02-21 PROCEDURE — 77386 HC INTENSITY-MODULATED RADIATION THERAPY (IMRT), COMPLEX: CPT | Performed by: STUDENT IN AN ORGANIZED HEALTH CARE EDUCATION/TRAINING PROGRAM

## 2025-02-21 RX ORDER — METOPROLOL TARTRATE 100 MG/1
100 TABLET ORAL 2 TIMES DAILY
Qty: 180 TABLET | Refills: 1 | Status: SHIPPED | OUTPATIENT
Start: 2025-02-21 | End: 2025-08-20

## 2025-02-25 PROBLEM — C34.11 MALIGNANT NEOPLASM OF UPPER LOBE, RIGHT BRONCHUS OR LUNG: Status: ACTIVE | Noted: 2025-02-25

## 2025-02-25 NOTE — PROGRESS NOTES
Radiation Oncology Treatment Summary    Patient Name:  Ramirez Dobbins  MRN:  [id number]  :  6/10/1933    Referring Provider: Chris Renner MD  Primary Care Provider: Jose Schafer DO    Brief History:   Please see the radiation oncology consultation note.  Briefly, Ramirez Dobbins is a 89 y.o. female with Malignant neoplasm of female breast, Clinical: Stage IB (cT2, cN0, cM0, G2, ER+, RI+, HER2-)    Malignant neoplasm of lower third of esophagus (Multi), Clinical: Stage Unknown (cTX, cN1, cM0, G3)    Malignant neoplasm of upper lobe, right bronchus or lung, Clinical: cT2b, cNX, cM0.  The patient completed radiotherapy as outlined below.    The radiation planning and treatment procedures were explained to the patient in advance, and all questions were answered. The benefits and goals of treatment, options and alternatives, limitations, side effects and risks of radiation were also explained. The patient provided informed consent.    Technical Summary:  Region(s) Treated: Esophagus and right upper lobe of lung  Radiation Dose Prescribed: 3000 cGy in 10 fractions  Radiation Technique/Machine/Energy Used: VMAT / Truebeam / 10 MV photons  Additional radiation technical details available in our radiation oncology EMR [facility] and our treatment planning system.    Radiation Treatment Summary:    3D CRT: Not Applicable Lower esophagus, Right Hilum of lung    Treatment Period Technique Fraction Dose Fractions Total Dose   Course 1 2/10/2025-2025  (days elapsed: 11)         Esph_RLung 2/10/2025-2025 VMAT 300 / 300 cGy 10 / 10 3000 / 3,000 cGy       Please see the patient's [facility] chart for further details regarding the radiation plan, including beam energy.    Concurrent Chemotherapy: None    Clinical Summary:  The patient tolerated this course of radiation therapy relatively well, with no unusual events or unanticipated toxicities. Symptoms during treatment included anxiety, low mood, occasional diarrhea and  mild cold pain the patient notes dysphagia requiring softer foods and liquids.    CTCAE Toxicity Overview:   Toxicity Assessment          2/12/2025    09:24 2/19/2025    09:35   Toxicity Assessment   Treatment Site Thoracic Thoracic   Anorexia Grade 1 Grade 0   Anxiety Grade 1 Grade 1   Dehydration Grade 0 Grade 0   Depression Grade 0 Grade 1   Dermatitis Radiation Grade 0 Grade 0   Diarrhea Grade 0 Grade 1       occasionally. takes senna   Fatigue Grade 1 Grade 1   Fibrosis Deep Connective Tissue Grade 0 Grade 0   Fracture Grade 0 Grade 0   Nausea Grade 0 Grade 1   Pain Grade 0 Grade 1       3/10 abd   Treatment Related Secondary Malignancy Grade 0 Grade 0   Tumor Pain Grade 0 Grade 0   Vomiting Grade 0 Grade 0   Constipation Grade 0 Grade 0   Dyspepsia Grade 0 Grade 0   Dysphagia Grade 0 Grade 1       soft foods   Esophagitis Grade 0 Grade 1   Mucositis Oral Grade 0 Grade 0   Upper Gastrointestinal Hemorrhage Grade 0 Grade 0   Peripheral Sensory Neuropathy Grade 0    Brachial Plexopathy Grade 0    Pneumonitis Grade 0 Grade 0   Aspiration Grade 0 Grade 0   Hoarseness Grade 0 Grade 0   Laryngeal Edema Grade 0 Grade 0   Myocardial Infarction Grade 0       small amount of pain in chest area Grade 0   Pericardial Effusion Grade 0 Grade 0   Pericarditis Grade 0 Grade 0   Esophageal Fistula Grade 0 Grade 0   Esophageal Obstruction Grade 1       feels as though food gets stopped up Grade 0   Esophageal Pain Grade 0 Grade 0   Esophageal Stenosis Grade 0 Grade 0   Esophageal Ulcer Grade 0 Grade 0   Bronchial Obstruction Grade 0 Grade 0   Cough Grade 1 Grade 1   Dyspnea Grade 1 Grade 1   Epistaxis Grade 0 Grade 0   Hiccups Grade 0 Grade 0   Hypoxia Grade 0 Grade 0   Pulmonary Fibrosis Grade 0 Grade 0   Lymphedema Grade 0 Grade 0   Thromboembolic Event Grade 0 Grade 0   Hot Flashes Grade 0      Patient Disposition:   The patient will be scheduled for follow-up at our clinic on 3/20/25 @ 1530. The patient was encouraged to  contact us for any questions or concerns in the interim.    Justyn Kellogg MD  [facility] [place]  [facility]  - Department of Radiation Oncology  Phone: [phone]  Fax: [phone]  Epic secure chat preferred

## 2025-03-03 ENCOUNTER — APPOINTMENT (OUTPATIENT)
Dept: HEMATOLOGY/ONCOLOGY | Facility: CLINIC | Age: OVER 89
End: 2025-03-03
Payer: MEDICARE

## 2025-03-20 ENCOUNTER — APPOINTMENT (OUTPATIENT)
Dept: RADIATION ONCOLOGY | Facility: CLINIC | Age: OVER 89
End: 2025-03-20
Payer: MEDICARE

## 2025-04-15 ENCOUNTER — APPOINTMENT (OUTPATIENT)
Dept: HEMATOLOGY/ONCOLOGY | Facility: CLINIC | Age: OVER 89
End: 2025-04-15
Payer: MEDICARE

## 2025-04-22 ENCOUNTER — APPOINTMENT (OUTPATIENT)
Dept: HEMATOLOGY/ONCOLOGY | Facility: CLINIC | Age: OVER 89
End: 2025-04-22
Payer: MEDICARE

## 2025-05-15 ENCOUNTER — APPOINTMENT (OUTPATIENT)
Dept: CARDIOLOGY | Facility: HOSPITAL | Age: OVER 89
End: 2025-05-15
Payer: MEDICARE

## 2025-06-04 ENCOUNTER — APPOINTMENT (OUTPATIENT)
Dept: PRIMARY CARE | Facility: CLINIC | Age: OVER 89
End: 2025-06-04
Payer: MEDICARE

## 2025-08-11 ENCOUNTER — APPOINTMENT (OUTPATIENT)
Dept: VASCULAR SURGERY | Facility: HOSPITAL | Age: OVER 89
End: 2025-08-11
Payer: MEDICARE